# Patient Record
Sex: MALE | Race: BLACK OR AFRICAN AMERICAN | Employment: FULL TIME | URBAN - METROPOLITAN AREA
[De-identification: names, ages, dates, MRNs, and addresses within clinical notes are randomized per-mention and may not be internally consistent; named-entity substitution may affect disease eponyms.]

---

## 2017-01-30 ENCOUNTER — TELEPHONE (OUTPATIENT)
Dept: ENDOCRINOLOGY | Age: 62
End: 2017-01-30

## 2017-03-06 ENCOUNTER — PATIENT OUTREACH (OUTPATIENT)
Dept: ENDOCRINOLOGY | Age: 62
End: 2017-03-06

## 2017-03-06 NOTE — LETTER
3/6/2017 11:45 AM 
 
Mr. Vladislav Long [address] [facility] [place] [id number] Dear Jeanette Kevin Jaywa: 
 
I am a RN Nurse Navigator working with [facility]. Part of my job is to follow up with patients who have been in the emergency department, hospital, or have a chronic disease. I check to see how you are feeling, answer any questions you may have about your health and check to see if you have a follow-up appointment to see your primary care physician. If you have changed your Primary Care Provider, let us know so we can update our records. Otherwise, I am available to help provide education and resources for your needs. I can be reached Monday thru Friday at [phone] or [phone]. Your next appointment with Dr. Berny Pack is scheduled for Friday, March 24, 2017 at 11:30 am. 
 
Thank you for allowing me to participate in your care! Sincerely, Rafa Lopez RN Enclosure(s) Home Blood Sugar Monitoring Record Please call, email, mail or fax your most recent blood sugar readings to office for review. Fax number is [phone] or [phone]. Thank you.

## 2017-03-06 NOTE — PROGRESS NOTES
Spoke to patient today, verified patient's name, address and . Patient states doing well, no problems or concerns noted. Patient states taking blood sugar readings 1-2 times daily, prior to breakfast, 2-hour after meal or bedtime. Patient states will fax most recent blood sugar readings to office for MD review. Patient given fax number of [phone]. Patient open to case management due to uncontrolled diabetes with a last A1C of 10.6 on 10/14/16. Patient states currently taking: Metformin 1,000 mg 2 tablets at dinner, Glipizide 10 mg 2 times daily, Farxiga 10 mg daily and Tresiba 32 unit in the morning. Patient's last office visit was on 10/20/16; next scheduled office visit is on 3/24/17 at 11:30 am.    Patient goal is to record blood sugar readings daily and send to office for MD review. Patient has nurse navigator's contact information and knows to call if needed. Will continue to follow.   Next contact: 3/24/17

## 2017-03-24 ENCOUNTER — OFFICE VISIT (OUTPATIENT)
Dept: ENDOCRINOLOGY | Age: 62
End: 2017-03-24

## 2017-03-24 VITALS
HEART RATE: 74 BPM | DIASTOLIC BLOOD PRESSURE: 84 MMHG | SYSTOLIC BLOOD PRESSURE: 152 MMHG | BODY MASS INDEX: 30.49 KG/M2 | HEIGHT: 71 IN | WEIGHT: 217.8 LBS

## 2017-03-24 DIAGNOSIS — I10 ESSENTIAL HYPERTENSION, BENIGN: ICD-10-CM

## 2017-03-24 DIAGNOSIS — E55.9 VITAMIN D DEFICIENCY: ICD-10-CM

## 2017-03-24 RX ORDER — CHOLECALCIFEROL (VITAMIN D3) 125 MCG
CAPSULE ORAL
COMMUNITY
End: 2020-01-29 | Stop reason: ALTCHOICE

## 2017-03-24 RX ORDER — DICLOFENAC SODIUM 10 MG/G
GEL TOPICAL 2 TIMES DAILY
COMMUNITY
End: 2018-02-16

## 2017-03-24 RX ORDER — METFORMIN HYDROCHLORIDE 500 MG/1
TABLET, EXTENDED RELEASE ORAL
Qty: 120 TAB | Refills: 11 | Status: SHIPPED | OUTPATIENT
Start: 2017-03-24 | End: 2018-03-28 | Stop reason: SDUPTHER

## 2017-03-24 NOTE — PATIENT INSTRUCTIONS
1) I would stop checking the sugars every morning. Instead, check 2 hours after a meal (goal is less than 180). Alternate one meal a day to check (example: one day check after breakfast, one day after lunch, one day after dinner). Write down what you ate if your blood sugar is more than 180 so you can know to cut back or cut out this food from your diet. 2) Your Hemoglobin A1c (3 month test of blood sugar) is 9.4% which is down from 10.6% and is the lowest it's been in 2 years but I think we can continue to get this lower. 3) If you are having readings under 90 more than 2 times a week, plan on decreasing the tresiba by 2 units as needed. For now stay on 32. 4) Try to get back into walking 10-15 minutes a day 2-3 times a week and work up to 30 minutes 5 times a week. This does not have to be done altogether but can be split up throughout the day. 5) Your cholesterol and vitamin D and kidney function are normal.    6) Your blood pressure was a little high today but we'll watch this for now. 7) You can try a Super B-complex vitamin with B6 and B12 one tab daily to see if this helps with any of the nerve pain symptoms you have been having. 8) Do your best to eliminate the soda, honey buns and candy. 9) We will try the toujeo in place of tresiba 32 units at night. As long as you are tolerating this, go ahead and fill the prescription for the toujeo and take the co-pay card for $10.    10) Try the jardiance 10 mg tabs in place of the farxiga. As long as you are tolerating this, go and fill the prescription and take the co-pay card for $0.    11) We will have to change to generic metformin  mg tabs to take 4 tabs daily in place of the 1000 mg tabs.

## 2017-03-24 NOTE — PROGRESS NOTES
Chief Complaint   Patient presents with    Diabetes     pcp and pharmacy confirmed     History of Present Illness: Angela Melo is a 59 y.o. male here for follow up of diabetes. Weight up 1 lbs since last visit in 10/16. Has been taking 32 units of tresiba at night and metformin 2 tabs at night and glipizide 10 mg bid and farxiga 10 mg daily. His fasting sugars are in the 90-130s for the most part but does have a few readings in the 150-160s. Does have a few in the 80-90 range when he cut his eating off   Not checking after meal readings. Started a mvi and fish oil the past 3 weeks and started doing more exercise and is feeling better and hopes to get his sugars down with more weight loss. Has an exercise bike at home that he hopes to use more and hopes to increase his walking. Does drink one 20 oz regular soda per day but no other sweet drinks. Does have a honey bun for lunch and some candy. His insurance will no longer cover the ER metformin 1g tabs, farxiga and tresiba as of 4/1/17 so we had to review all the changes as below. Current Outpatient Prescriptions   Medication Sig    MULTIVIT-MINERALS/FA/LYCOPENE (ONE-A-DAY MEN'S MULTIVITAMIN PO) Take  by mouth.  cholecalciferol, vitamin D3, (VITAMIN D3) 2,000 unit tab Take  by mouth.  diclofenac (VOLTAREN) 1 % gel Apply  to affected area two (2) times a day.  insulin degludec (TRESIBA FLEXTOUCH U-200) 200 unit/mL (3 mL) inpn 32 Units by SubCUTAneous route daily. Dose change 11/23/16--updated med list--did not send prescription to the pharmacy    dapagliflozin (FARXIGA) 10 mg tab Take 1 Tab by mouth daily.  BLOOD-GLUCOSE METER (RELION PRIME METER) by Does Not Apply route.  JORGE PEN NEEDLE 32 gauge x 5/32\" ndle Use as directed once daily    CINNAMON BARK (CINNAMON PO) Take  by mouth.  DOCOSAHEXANOIC ACID/EPA (FISH OIL PO) Take  by mouth.  CARTIA  mg ER capsule Take 240 mg by mouth daily.     losartan-hydrochlorothiazide (HYZAAR) 100-25 mg per tablet Take 1 Tab by mouth daily.  aspirin 81 mg chewable tablet Take 81 mg by mouth daily.  metFORMIN ER 1,000 mg tr24 Take 2 Tabs by mouth daily (with dinner).  glipiZIDE SR (GLUCOTROL) 10 mg CR tablet Take 10 mg by mouth two (2) times a day. No current facility-administered medications for this visit. No Known Allergies     Review of Systems:  - Eyes: no blurry vision or double vision  - Cardiovascular: no chest pain  - Respiratory: no shortness of breath  - Musculoskeletal: no myalgias  - Neurological: no numbness/tingling in extremities    Physical Examination:  Blood pressure 152/84, pulse 74, height 5' 11\" (1.803 m), weight 217 lb 12.8 oz (98.8 kg). - General: pleasant, no distress, good eye contact   - Neck: no carotid bruits  - Cardiovascular: regular, normal rate, nl s1 and s2, no m/r/g,   - Respiratory: clear bilaterally  - Integumentary: no edema,   - Psychiatric: normal mood and affect    Data Reviewed:   Component      Latest Ref Rng & Units 3/20/2017 3/20/2017 3/20/2017 3/20/2017           7:39 AM  7:39 AM  7:39 AM  7:39 AM   Glucose      65 - 99 mg/dL  102 (H)     BUN      8 - 27 mg/dL  14     Creatinine      0.76 - 1.27 mg/dL  0.82     GFR est non-AA      >59 mL/min/1.73  94     GFR est AA      >59 mL/min/1.73  109     BUN/Creatinine ratio      10 - 22  17     Sodium      134 - 144 mmol/L  139     Potassium      3.5 - 5.2 mmol/L  4.5     Chloride      96 - 106 mmol/L  97     CO2      18 - 29 mmol/L  24     Calcium      8.6 - 10.2 mg/dL  9.0     Cholesterol, total      100 - 199 mg/dL 154      Triglyceride      0 - 149 mg/dL 65      HDL Cholesterol      >39 mg/dL 47      VLDL, calculated      5 - 40 mg/dL 13      LDL, calculated      0 - 99 mg/dL 94      Hemoglobin A1c, (calculated)      4.8 - 5.6 %    9.4 (H)   Estimated average glucose      mg/dL    223   VITAMIN D, 25-HYDROXY      30.0 - 100.0 ng/mL   41.0        Assessment/Plan:     1.  Type II or unspecified type diabetes mellitus without mention of complication, uncontrolled ([facility]): his most recent Hgb A1c was 9.4% in 3/17 down from 10.6% in 10/16 up from 9.9% in 4/16 down from 11.4% in 1/16 up from 10.2% in 9/15 up from 10% in 1/15. A1c is the best it's been in 2 years but still is above goal.  The likely problem is his post-prandial sugars are rising but he doesn't check to see what they are so I advised him to please start doing this. We need to change his metformin ER 1000 mg tabs to 500 mg tabs and his farxiga to jardiance and his tresiba to New KCBX per his formulary. I gave him samples of the toujeo and jardiance to try. We spent 40 minutes of face to face time together and > 50% of the time was spent in counseling on diabetes management and determining what changes to make to his medication regimen. He will eliminate soda and candy and honey buns and begin an exercise regimen as below. - cont metformin ER 1000 mg 2 tabs at dinner and then change to metformin  mg tabs 4 tabs at dinner  - cont glipizide ER 10 mg bid  - cont farxiga 10 mg daily, then changed to jardiance 10 mg daily  - cont tresiba 32 units daily, then change to toujeo 32 units daily  - check bs twice daily at alternating times  - foot exam done 5/16  - optho UTD 1/15  - Given DM, Goal LDL < 100, non-HDL < 130, and TG < 150. LDL 79 in 1/15 and 68 in 9/15 and 70 in 4/16 and 94 in 3/17 not on meds  - microalbumin nl 10/16  - check A1c and bmp prior to next visit      2. Unspecified essential hypertension: his BP was above goal < 140/90 but changes in diet and exercise should help. -  cont current regimen for now      3. Unspecified vitamin D deficiency: Level was 28.1 in 9/15 and with taking 1000 units daily up to 37 in 1/16.   Down to 29 in 10/16 so increased to 2000 units daily and up to 41 in 3/17.  - cont vitamin D 2000 units daily  - check Vitamin D 25-OH level in 8 months        Patient Instructions   1) I would stop checking the sugars every morning. Instead, check 2 hours after a meal (goal is less than 180). Alternate one meal a day to check (example: one day check after breakfast, one day after lunch, one day after dinner). Write down what you ate if your blood sugar is more than 180 so you can know to cut back or cut out this food from your diet. 2) Your Hemoglobin A1c (3 month test of blood sugar) is 9.4% which is down from 10.6% and is the lowest it's been in 2 years but I think we can continue to get this lower. 3) If you are having readings under 90 more than 2 times a week, plan on decreasing the tresiba by 2 units as needed. For now stay on 32. 4) Try to get back into walking 10-15 minutes a day 2-3 times a week and work up to 30 minutes 5 times a week. This does not have to be done altogether but can be split up throughout the day. 5) Your cholesterol and vitamin D and kidney function are normal.    6) Your blood pressure was a little high today but we'll watch this for now. 7) You can try a Super B-complex vitamin with B6 and B12 one tab daily to see if this helps with any of the nerve pain symptoms you have been having. 8) Do your best to eliminate the soda, honey buns and candy. 9) We will try the toujeo in place of tresiba 32 units at night. As long as you are tolerating this, go ahead and fill the prescription for the toujeo and take the co-pay card for $10.    10) Try the jardiance 10 mg tabs in place of the farxiga. As long as you are tolerating this, go and fill the prescription and take the co-pay card for $0.    11) We will have to change to generic metformin  mg tabs to take 4 tabs daily in place of the 1000 mg tabs. Follow-up Disposition:  Return in about 5 months (around 8/24/2017).     Copy sent to:  Dr. Dai Coley

## 2017-03-24 NOTE — MR AVS SNAPSHOT
Visit Information Date & Time Provider Department Dept. Phone Encounter #  
 3/24/2017 11:30 AM Humza Gamble, [address] [place] [address] Diabetes and Endocrinology [phone] [id number] Follow-up Instructions Return in about 5 months (around 8/24/2017). Upcoming Health Maintenance Date Due Hepatitis C Screening 1955 Pneumococcal 19-64 Medium Risk (1 of 1 - PPSV23) 8/5/1972 DTaP/Tdap/Td series (1 - Tdap) 8/5/1974 FOBT Q 1 YEAR AGE 50-75 8/5/2003 ZOSTER VACCINE AGE 60> 8/5/2013 EYE EXAM RETINAL OR DILATED Q1 1/31/2016 INFLUENZA AGE 9 TO ADULT 8/1/2016 FOOT EXAM Q1 5/5/2017 HEMOGLOBIN A1C Q6M 9/20/2017 MICROALBUMIN Q1 10/14/2017 LIPID PANEL Q1 3/20/2018 Allergies as of 3/24/2017  Review Complete On: 3/24/2017 By: Humza Gamble MD  
 No Known Allergies Current Immunizations  Never Reviewed No immunizations on file. Not reviewed this visit You Were Diagnosed With   
  
 Codes Comments Uncontrolled type 2 diabetes mellitus without complication, with long-term current use of insulin ([facility]    -  Primary ICD-10-CM: E11.65, Z79.4 ICD-9-CM: 250.02, V58.67 Essential hypertension, benign     ICD-10-CM: I10 
ICD-9-CM: 401.1 Vitamin D deficiency     ICD-10-CM: E55.9 ICD-9-CM: 268.9 Vitals BP Pulse Height(growth percentile) Weight(growth percentile) BMI Smoking Status 152/84 74 5' 11\" (1.803 m) 217 lb 12.8 oz (98.8 kg) 30.38 kg/m2 Never Smoker Vitals History BMI and BSA Data Body Mass Index Body Surface Area  
 30.38 kg/m 2 2.22 m 2 Preferred Pharmacy Pharmacy Name Phone [facility] [address] [facility], [facility] [phone] Your Updated Medication List  
  
   
This list is accurate as of: 3/24/17 12:40 PM.  Always use your most recent med list.  
  
  
  
  
 aspirin 81 mg chewable tablet Take 81 mg by mouth daily. CARTIA  mg ER capsule Generic drug:  dilTIAZem CD Take 240 mg by mouth daily. CINNAMON PO Take  by mouth. diclofenac 1 % Gel Commonly known as:  VOLTAREN Apply  to affected area two (2) times a day. empagliflozin 10 mg tablet Commonly known as:  Kaya Gm Take 1 Tab by mouth daily. FISH OIL PO Take  by mouth. glipiZIDE SR 10 mg CR tablet Commonly known as:  GLUCOTROL XL Take 10 mg by mouth two (2) times a day. insulin glargine 300 unit/mL (1.5 mL) Inpn Commonly known as:  TOUJEO SOLOSTAR  
32 Units by SubCUTAneous route nightly. losartan-hydroCHLOROthiazide 100-25 mg per tablet Commonly known as:  HYZAAR Take 1 Tab by mouth daily. metFORMIN  mg tablet Commonly known as:  GLUCOPHAGE XR Take 4 tabs daily Renetta Pen Needle 32 gauge x \" Ndle Generic drug:  Insulin Needles (Disposable) Use as directed once daily ONE-A-DAY MEN'S MULTIVITAMIN PO Take  by mouth. RELION PRIME METER  
by Does Not Apply route. VITAMIN D3 2,000 unit Tab Generic drug:  cholecalciferol (vitamin D3) Take  by mouth. Prescriptions Printed Refills  
 insulin glargine (TOUJEO SOLOSTAR) 300 unit/mL (1.5 mL) inpn 11 Si Units by SubCUTAneous route nightly. Class: Print Route: SubCUTAneous  
 empagliflozin (JARDIANCE) 10 mg tablet 11 Sig: Take 1 Tab by mouth daily. Class: Print Route: Oral  
 metFORMIN ER (GLUCOPHAGE XR) 500 mg tablet 11 Sig: Take 4 tabs daily Class: Print We Performed the Following HEMOGLOBIN A1C WITH EAG [10864 CPT(R)] METABOLIC PANEL, BASIC [11369 CPT(R)] Follow-up Instructions Return in about 5 months (around 2017). Patient Instructions 1) I would stop checking the sugars every morning. Instead, check 2 hours after a meal (goal is less than 180).   Alternate one meal a day to check (example: one day check after breakfast, one day after lunch, one day after dinner). Write down what you ate if your blood sugar is more than 180 so you can know to cut back or cut out this food from your diet. 2) Your Hemoglobin A1c (3 month test of blood sugar) is 9.4% which is down from 10.6% and is the lowest it's been in 2 years but I think we can continue to get this lower. 3) If you are having readings under 90 more than 2 times a week, plan on decreasing the tresiba by 2 units as needed. For now stay on 32. 4) Try to get back into walking 10-15 minutes a day 2-3 times a week and work up to 30 minutes 5 times a week. This does not have to be done altogether but can be split up throughout the day. 5) Your cholesterol and vitamin D and kidney function are normal. 
 
6) Your blood pressure was a little high today but we'll watch this for now. 7) You can try a Super B-complex vitamin with B6 and B12 one tab daily to see if this helps with any of the nerve pain symptoms you have been having. 8) Do your best to eliminate the soda, honey buns and candy. 9) We will try the toujeo in place of tresiba 32 units at night. As long as you are tolerating this, go ahead and fill the prescription for the toujeo and take the co-pay card for $10. 
 
10) Try the jardiance 10 mg tabs in place of the farxiga. As long as you are tolerating this, go and fill the prescription and take the co-pay card for $0. 
 
11) We will have to change to generic metformin  mg tabs to take 4 tabs daily in place of the 1000 mg tabs. Introducing [facility]! Idris Velasco introduces [employer] (IMMI) patient portal. Now you can access parts of your medical record, email your doctor's office, and request medication refills online. 1. In your internet browser, go to [email or web address] 2. Click on the First Time User? Click Here link in the Sign In box. You will see the New Member Sign Up page. 3. Enter your EatingWell Access Code exactly as it appears below. You will not need to use this code after youve completed the sign-up process. If you do not sign up before the expiration date, you must request a new code. · EatingWell Access Code: [email or web address] Expires: 6/22/2017 12:28 PM 
 
4. Enter the last four digits of your Social Security Number (xxxx) and Date of Birth (mm/dd/yyyy) as indicated and click Submit. You will be taken to the next sign-up page. 5. Create a EatingWell ID. This will be your EatingWell login ID and cannot be changed, so think of one that is secure and easy to remember. 6. Create a EatingWell password. You can change your password at any time. 7. Enter your Password Reset Question and Answer. This can be used at a later time if you forget your password. 8. Enter your e-mail address. You will receive e-mail notification when new information is available in [address]. 9. Click Sign Up. You can now view and download portions of your medical record. 10. Click the Download Summary menu link to download a portable copy of your medical information. If you have questions, please visit the Frequently Asked Questions section of the EatingWell website. Remember, EatingWell is NOT to be used for urgent needs. For medical emergencies, dial 911. Now available from your iPhone and Android! Please provide this summary of care documentation to your next provider. Your primary care clinician is listed as Jory Wei. If you have any questions after today's visit, please call [phone].

## 2017-03-29 ENCOUNTER — PATIENT OUTREACH (OUTPATIENT)
Dept: ENDOCRINOLOGY | Age: 62
End: 2017-03-29

## 2017-03-29 NOTE — PROGRESS NOTES
Attempted to contact patient, follow up for chronic condition of diabetes with blood sugar readings, insulin regimen and other diabetic medications, no answer, left voicemail message to return telephone call. Patient's current A1C is 9.4 on 3/20/17, decreased from 10.6 on 10/14/16.

## 2017-03-31 ENCOUNTER — PATIENT OUTREACH (OUTPATIENT)
Dept: ENDOCRINOLOGY | Age: 62
End: 2017-03-31

## 2017-04-03 ENCOUNTER — TELEPHONE (OUTPATIENT)
Dept: ENDOCRINOLOGY | Age: 62
End: 2017-04-03

## 2017-04-04 ENCOUNTER — PATIENT OUTREACH (OUTPATIENT)
Dept: ENDOCRINOLOGY | Age: 62
End: 2017-04-04

## 2017-04-04 NOTE — PROGRESS NOTES
Spoke to patient today, verified patient's name, address and . Patient states doing good, no problems or concerns noted. Patient states taking blood sugar readings 1 time daily, prior to breakfast.  Encouraged patient to take blood sugar reading 2 times daily, prior to breakfast and after dinner or at bedtime. Encouraged patient to record and send blood sugar reading to office for MD review. Patient states agreement and understanding. Patient states taking: Toujeo 32 units nightly, Jardiance 10 mg daily, Metformin  mg, 4 tablets daily and Glipizide 10 mg twice daily. Patient states currently using samples provided by MD at last office visit. Encouraged patient to active discount saving cards for Toujeo and Jardiance and fill prescription. Advised patient to call me if experiencing difficulty filling prescriptions at discounted price. Patient states agreement and understanding.       Next contact:  4/10/17

## 2017-04-10 ENCOUNTER — PATIENT OUTREACH (OUTPATIENT)
Dept: ENDOCRINOLOGY | Age: 62
End: 2017-04-10

## 2017-04-10 ENCOUNTER — TELEPHONE (OUTPATIENT)
Dept: ENDOCRINOLOGY | Age: 62
End: 2017-04-10

## 2017-04-10 NOTE — PROGRESS NOTES
Patient return telephone call, verified patient's name, address and . Patient states having trouble with discount savings card for Jardiance medication. Patient states Vanesa Ronquillo in [facility] ([phone]) saying that he has federally issued healtih insurance and does not qualify for the discount saving cards. Patient argues that he has regular commercial health insurance which should be able to qualify for the saving program.      NN called [employer] representative, Lucille Ramirez for advise. NN advised to call [facility] for more details. Spoke to Emily at [facility], pharmacy technician states that there is record of patient's use of the Jardiance savings card. Called patient again, patient states talking to Johny at [facility]. NN called Lucille Ramirez to contact Hinton at [facility] for clarification of discount savings card issue and possible override of the discount saving card. Lucille Ramirez called states will work with Vanesa Ronquillo to help override discount savings card for patient. Lucille Ramirez stated that patient's co-payment with his health insurance is $30 and $0 with discount savings card. NN states patient has multiple medications and cost of current medications co-payments are expensive to patient. NN has not received an answer about the Jardiance discount saving card status, left voicemail message with patient about status of Jardiance medication.     Next contact: 17

## 2017-04-10 NOTE — TELEPHONE ENCOUNTER
Patient is calling to speak with you. Please return his call to [phone]. Thank you.   Ernie Griffin

## 2017-04-14 ENCOUNTER — PATIENT OUTREACH (OUTPATIENT)
Dept: ENDOCRINOLOGY | Age: 62
End: 2017-04-14

## 2017-04-14 NOTE — PROGRESS NOTES
[place] , Cody Song left voicemail message that patient was approved for discount saving card for Jardiance at [employer] for 12 months. Patient informed by pharmacy tech, Shon Wilhelm and medication was picked up by patient. Spoke to patient today, verified patient's name, address and . Patient states doing good, no problems or concerns noted. Patient states taking: Jardiance 10 mg daily , Toujeo 32 units nightly, Metformin 500 mg, 4 tablets daily and Glipizide 10 mg twice daily      Goals Addressed             Most Recent     Knowledge and adherence of medication (ie. action, side effects, missed dose, etc.)   On track (2016)             Patient will active new discount savings cards for Jardiance and [address]. Patient will fill prescription and call office if difficulty attaining medications. YHW    17  Patient was approved for discount savings card for Toujeo ($10) and Jardiance ($0). YHW    17   Patient picked up medication and is currently taking. YW       Skills necessary to properly manage their diabetes, including use of devices and symptom monitoring tools. On track (2016)             17   Patient will check blood sugar readings 1-2 times daily, prior to breakfast or 2-hours after eating, record and send to office for MD review.   Patel George             Next contact: 17

## 2017-04-21 RX ORDER — PEN NEEDLE, DIABETIC 31 GX5/16"
NEEDLE, DISPOSABLE MISCELLANEOUS
Qty: 100 PEN NEEDLE | Refills: 11 | Status: SHIPPED | OUTPATIENT
Start: 2017-04-21 | End: 2018-07-03 | Stop reason: SDUPTHER

## 2017-05-03 ENCOUNTER — PATIENT OUTREACH (OUTPATIENT)
Dept: ENDOCRINOLOGY | Age: 62
End: 2017-05-03

## 2017-05-03 NOTE — PROGRESS NOTES
Attempted to contact patient, follow up for chronic condition of diabetes with blood sugar readings and diabetic medication regimen, no answer, left voicemail message to return telephone call.

## 2017-06-07 ENCOUNTER — PATIENT OUTREACH (OUTPATIENT)
Dept: ENDOCRINOLOGY | Age: 62
End: 2017-06-07

## 2017-06-07 NOTE — PROGRESS NOTES
Attempted to contact patient, follow up for chronic condition of diabetes with blood sugar readings and insulin regimen. No answer, left voicemail message to return telephone call.

## 2017-06-21 ENCOUNTER — PATIENT OUTREACH (OUTPATIENT)
Dept: ENDOCRINOLOGY | Age: 62
End: 2017-06-21

## 2017-06-21 RX ORDER — FLUCONAZOLE 150 MG/1
TABLET ORAL
Qty: 2 TAB | Refills: 3 | Status: SHIPPED | OUTPATIENT
Start: 2017-06-21 | End: 2017-09-15

## 2017-06-21 NOTE — PROGRESS NOTES
Patient called today, verified patient's name, address and . Patient states is having a problem with medication, Jardiance. Patient states having symptoms of a yeast infection. Patient states has not had a visit with primary care physician (PCP). Patient states he read about the side effects of Jardiance and believes this is has a yeast infection. MD notified. Lucero Obregon MD at [phone]        Status: Sign at close encounter            Have him take a dose of fluconazole today to treat the yeast infection and if needed may repeat in 2 days. I put refills on this in case he needs them in the future. If this is continuing despite the fluconazole, then he should let me know and we may need to stop the jardiance. Called patient, informed patient of the above MD instruction. Patient states understanding and agreement. Goals Addressed             Most Recent     Knowledge and adherence of medication (ie. action, side effects, missed dose, etc.)   On track (2016)             Patient will active new discount savings cards for Jardiance and [address]. Patient will fill prescription and call office if difficulty attaining medications. YW    17  Patient was approved for discount savings card for Toujeo ($10) and Jardiance ($0). YW    17   Patient picked up medication and is currently taking. YHW    17  Patient having side effects from Jardiance, possible yeast infection. MD notified. VARINDER educated patient on increasing oral fluids and the practice good hygiene.   Salina Alva

## 2017-06-21 NOTE — PROGRESS NOTES
Have him take a dose of fluconazole today to treat the yeast infection and if needed may repeat in 2 days. I put refills on this in case he needs them in the future. If this is continuing despite the fluconazole, then he should let me know and we may need to stop the jardiance.

## 2017-06-23 ENCOUNTER — PATIENT OUTREACH (OUTPATIENT)
Dept: ENDOCRINOLOGY | Age: 62
End: 2017-06-23

## 2017-06-23 NOTE — PROGRESS NOTES
Attempted to contact patient, follow up for side effect of yeast infection with Jardiance medication. MD prescribed oral antifungal medication. No answer, left voicemail to return telephone call. 2:20 pm  Patient called, states still having issue with yeast infection and did take the second dose of antifungal medication today as per MD.      Patient states will call next week if no relief.

## 2017-07-12 ENCOUNTER — PATIENT OUTREACH (OUTPATIENT)
Dept: ENDOCRINOLOGY | Age: 62
End: 2017-07-12

## 2017-07-12 NOTE — Clinical Note
FYI,  Patient will discontinue the Jardiance, increase the Toujeo to 36 units at night and will be sending blood sugar in for review in about 1 week.

## 2017-07-12 NOTE — PROGRESS NOTES
Patient called, verified patient's name, address and . Patient states continues with yeast-infection with Jardiance medication. Patient states Diflucan did work in the beginning, but is not at this time. Patient would like to discontinue the Jardiance. MD notified. Patient states is currently taking: Toujeo 32 units at night, Metformin 2,000 mg daily, Glipizide 10 mg daily and Jardiance 10 mg daily. Patient states blood sugars have been averaging between: 100-180. Patient states will fax most recent blood sugar reading to office for review. Clau Nelson MD at [phone]        Status: Signed            If he stops the jardiance, he likely will need more insulin to control his blood sugars and would recommend increasing the toujeo to 36 units at night. NN spoke to patient, verified patient's name, address and . Patient informed of MD above instruction. Patient states agreement and understanding.

## 2017-07-12 NOTE — PROGRESS NOTES
If he stops the jardiance, he likely will need more insulin to control his blood sugars and would recommend increasing the toujeo to 36 units at night.

## 2017-07-20 ENCOUNTER — PATIENT OUTREACH (OUTPATIENT)
Dept: ENDOCRINOLOGY | Age: 62
End: 2017-07-20

## 2017-07-20 NOTE — PROGRESS NOTES
Goals Addressed             Most Recent     Patient verbalizes understanding of self -management goals of living with Diabetes. On track (12/13/2016)             7/12/17  Per MD, discontinue Jardiance and increase Toujeo to 36 units nightly. YHW    7/20/17  Attempted to contact patient, follow up for chronic condition of diabetes with blood sugar readings and insulin regimen, no answer, left voicemail message to return telephone call.   Arthur Ogden

## 2017-07-27 ENCOUNTER — PATIENT OUTREACH (OUTPATIENT)
Dept: ENDOCRINOLOGY | Age: 62
End: 2017-07-27

## 2017-07-27 NOTE — PROGRESS NOTES
Goals Addressed             Most Recent     Patient verbalizes understanding of self -management goals of living with Diabetes. On track (12/13/2016)             7/12/17  Per MD, discontinue Jardiance and increase Toujeo to 36 units nightly. YHW    7/20/17  Attempted to contact patient, follow up for chronic condition of diabetes with blood sugar readings and insulin regimen, no answer, left voicemail message to return telephone call. [facility]    7/27/17  Patient called office with most recent blood sugar readings. YHW                    Per patient, most recent blood sugar readings:    7/12/17  155    7/13/17  101    7/14/17  113    7/15/17  127    7/16/17  No reading    7/17/17  135    7/18/17  145    7/19/17  134    7/20/17  No reading    7/21/17  131    Patient states he has stopped the Jardiance and increased the Toujeo to 36 units nightly.

## 2017-08-01 ENCOUNTER — PATIENT OUTREACH (OUTPATIENT)
Dept: ENDOCRINOLOGY | Age: 62
End: 2017-08-01

## 2017-08-01 NOTE — PROGRESS NOTES
Please let him know that these readings look pretty good but to keep his sugars under 130 everyday, I would recommend increasing the toujeo to 40 units daily now that he is off the jardiance.

## 2017-08-01 NOTE — PROGRESS NOTES
Goals Addressed             Most Recent     Patient verbalizes understanding of self -management goals of living with Diabetes. On track (12/13/2016)             7/12/17  Per MD, discontinue Jardiance and increase Toujeo to 36 units nightly. YHW    7/20/17  Attempted to contact patient, follow up for chronic condition of diabetes with blood sugar readings and insulin regimen, no answer, left voicemail message to return telephone call. [facility]    7/27/17  Patient called office with most recent blood sugar readings. [facility]    8/1/17  Christina Pulliam MD at 07/27/17 1548        Status: Signed            Please let him know that these readings look pretty good but to keep his sugars under 130 everyday, I would recommend increasing the toujeo to 40 units daily now that he is off the jardiance. NN called patient, left voicemail message with the above MD instruction.

## 2017-08-28 ENCOUNTER — PATIENT OUTREACH (OUTPATIENT)
Dept: ENDOCRINOLOGY | Age: 62
End: 2017-08-28

## 2017-08-29 NOTE — PROGRESS NOTES
Goals Addressed             Most Recent     Patient verbalizes understanding of self -management goals of living with Diabetes. On track (12/13/2016)             7/12/17  Per MD, discontinue Jardiance and increase Toujeo to 36 units nightly. YHW    7/20/17  Attempted to contact patient, follow up for chronic condition of diabetes with blood sugar readings and insulin regimen, no answer, left voicemail message to return telephone call. [facility]    7/27/17  Patient called office with most recent blood sugar readings. [facility]    8/1/17  Heidi Calabrese MD at 07/27/17 1548        Status: Signed            Please let him know that these readings look pretty good but to keep his sugars under 130 everyday, I would recommend increasing the toujeo to 40 units daily now that he is off the jardiance. NN called patient, left voicemail message with the above MD instruction. 8/28/17  Patient left NN voicemail message with most recent blood sugar readings. NN recorded readings and forwarded to MD for review.   [facility]

## 2017-08-31 LAB
BUN SERPL-MCNC: 15 MG/DL (ref 8–27)
BUN/CREAT SERPL: 21 (ref 10–24)
CALCIUM SERPL-MCNC: 9 MG/DL (ref 8.6–10.2)
CHLORIDE SERPL-SCNC: 98 MMOL/L (ref 96–106)
CO2 SERPL-SCNC: 27 MMOL/L (ref 18–29)
CREAT SERPL-MCNC: 0.73 MG/DL (ref 0.76–1.27)
EST. AVERAGE GLUCOSE BLD GHB EST-MCNC: 237 MG/DL
GLUCOSE SERPL-MCNC: 96 MG/DL (ref 65–99)
HBA1C MFR BLD: 9.9 % (ref 4.8–5.6)
POTASSIUM SERPL-SCNC: 4.2 MMOL/L (ref 3.5–5.2)
SODIUM SERPL-SCNC: 139 MMOL/L (ref 134–144)

## 2017-09-07 ENCOUNTER — PATIENT OUTREACH (OUTPATIENT)
Dept: ENDOCRINOLOGY | Age: 62
End: 2017-09-07

## 2017-09-07 NOTE — PROGRESS NOTES
Jay Gomes routed conversation to You 1 hour ago (10:08 AM)                     Sawyer Mcallister [phone]  Jay Mireya 1 hour ago (10:07 AM)                       Jay Mireya 1 hour ago (10:07 AM)              Patient called to talk to you about his blood sugar readings. He can be reached at:  [phone]. NN returned patient's telephone call, left message to return telephone call. Goals Addressed             Most Recent     COMPLETED: Knowledge and adherence of medication (ie. action, side effects, missed dose, etc.)   On track (12/13/2016)             Patient will active new discount savings cards for Jardiance and [address]. Patient will fill prescription and call office if difficulty attaining medications. YW    4/14/17  Patient was approved for discount savings card for Toujeo ($10) and Jardiance ($0). YW    4/14/17   Patient picked up medication and is currently taking. YW    6/21/17  Patient having side effects from Jardiance, possible yeast infection. MD notified. NN educated patient on increasing oral fluids and the practice good hygiene. YHW       Patient verbalizes understanding of self -management goals of living with Diabetes. On track (12/13/2016)             7/12/17  Per MD, discontinue Jardiance and increase Toujeo to 36 units nightly. YW    7/20/17  Attempted to contact patient, follow up for chronic condition of diabetes with blood sugar readings and insulin regimen, no answer, left voicemail message to return telephone call. [facility]    7/27/17  Patient called office with most recent blood sugar readings. [facility]    8/1/17  Grayson Blum MD at 07/27/17 7298        Status: Signed            Please let him know that these readings look pretty good but to keep his sugars under 130 everyday, I would recommend increasing the toujeo to 40 units daily now that he is off the jardiance. NN called patient, left voicemail message with the above MD instruction. 17  Patient left NN voicemail message with most recent blood sugar readings. NN recorded readings and forwarded to MD for review. YHW    17  Spoke to patient today, verified patient's name, address and . Patient states doing well, no problems or concerns noted. Patient states has begun exercising daily, doing the stationary bike and light weights. Patient states exercises from 30-60 minutes in the evening after work. Patient states taking medications as prescribed. Patient states having a follow up appointment on 9/15/17. NN encouraged patient to continue recording blood sugar readings and bring readings to next follow up appointment. Patient states understanding and agreement. YHW                 COMPLETED: Skills necessary to properly manage their diabetes, including use of devices and symptom monitoring tools. On track (2016)             17   Patient will check blood sugar readings 1-2 times daily, prior to breakfast or 2-hours after eating, record and send to office for MD review.   Anum Schreiber

## 2017-09-15 ENCOUNTER — OFFICE VISIT (OUTPATIENT)
Dept: ENDOCRINOLOGY | Age: 62
End: 2017-09-15

## 2017-09-15 ENCOUNTER — PATIENT OUTREACH (OUTPATIENT)
Dept: ENDOCRINOLOGY | Age: 62
End: 2017-09-15

## 2017-09-15 VITALS
DIASTOLIC BLOOD PRESSURE: 76 MMHG | HEART RATE: 67 BPM | WEIGHT: 215.2 LBS | SYSTOLIC BLOOD PRESSURE: 130 MMHG | BODY MASS INDEX: 30.13 KG/M2 | HEIGHT: 71 IN

## 2017-09-15 DIAGNOSIS — E55.9 VITAMIN D DEFICIENCY: ICD-10-CM

## 2017-09-15 DIAGNOSIS — I10 ESSENTIAL HYPERTENSION, BENIGN: ICD-10-CM

## 2017-09-15 NOTE — PATIENT INSTRUCTIONS
1) We will restart the farxiga 10 mg every morning. I sent a supply to the pharmacy but this will require an authorization and we'll work on it. Take the samples for the next 2 weeks and we'll let you know when it's approved. 2) Do your best to focus on the meals that are causing you to spike over 180 when checked 2 hours after a meal and limit your portions of these foods. 3) If you restart the farxiga and your morning sugars are coming down under 100, then cut back by 2 units as needed on the toujeo. 4) Your blood pressure is at goal but I would check a few times in the coming months at the local pharmacy to ensure it's staying 140/90.

## 2017-09-15 NOTE — MR AVS SNAPSHOT
Visit Information Date & Time Provider Department Dept. Phone Encounter #  
 9/15/2017  1:30 PM Deandre Melissa, [address] [place] [address] Diabetes and Endocrinology [phone] [id number] Follow-up Instructions Return in about 5 months (around 2/15/2018). Upcoming Health Maintenance Date Due Hepatitis C Screening 1955 Pneumococcal 19-64 Medium Risk (1 of 1 - PPSV23) 8/5/1972 DTaP/Tdap/Td series (1 - Tdap) 8/5/1974 FOBT Q 1 YEAR AGE 50-75 8/5/2003 ZOSTER VACCINE AGE 60> 6/5/2013 FOOT EXAM Q1 5/5/2017 INFLUENZA AGE 9 TO ADULT 8/1/2017 MICROALBUMIN Q1 10/14/2017 EYE EXAM RETINAL OR DILATED Q1 1/20/2018 HEMOGLOBIN A1C Q6M 2/28/2018 LIPID PANEL Q1 3/20/2018 Allergies as of 9/15/2017  Review Complete On: 9/15/2017 By: Deandre Melissa MD  
  
 Severity Noted Reaction Type Reactions Jardiance [Empagliflozin]  07/12/2017    Other (comments) Yeast infection Current Immunizations  Never Reviewed No immunizations on file. Not reviewed this visit You Were Diagnosed With   
  
 Codes Comments Uncontrolled type 2 diabetes mellitus without complication, with long-term current use of insulin ([facility]    -  Primary ICD-10-CM: E11.65, Z79.4 ICD-9-CM: 250.02, V58.67 Essential hypertension, benign     ICD-10-CM: I10 
ICD-9-CM: 401.1 Vitamin D deficiency     ICD-10-CM: E55.9 ICD-9-CM: 268.9 Vitals BP Pulse Height(growth percentile) Weight(growth percentile) BMI Smoking Status 130/76 67 5' 11\" (1.803 m) 215 lb 3.2 oz (97.6 kg) 30.01 kg/m2 Never Smoker Vitals History BMI and BSA Data Body Mass Index Body Surface Area 30.01 kg/m 2 2.21 m 2 Preferred Pharmacy Pharmacy Name Phone [address] Your Updated Medication List  
  
   
This list is accurate as of: 9/15/17  2:22 PM.  Always use your most recent med list.  
  
  
  
  
 aspirin 81 mg chewable tablet Take 81 mg by mouth daily. CARTIA  mg ER capsule Generic drug:  dilTIAZem CD Take 240 mg by mouth daily. CINNAMON PO Take  by mouth. dapagliflozin 10 mg Tab tablet Commonly known as:  [employer] Take 1 Tab by mouth daily. Replaces jardiance  
  
 diclofenac 1 % Gel Commonly known as:  VOLTAREN Apply  to affected area two (2) times a day. FISH OIL PO Take  by mouth. glipiZIDE SR 10 mg CR tablet Commonly known as:  GLUCOTROL XL Take 10 mg by mouth two (2) times a day. insulin glargine 300 unit/mL (1.5 mL) Inpn Commonly known as:  TOUJEO SOLOSTAR  
40 Units by SubCUTAneous route nightly. losartan-hydroCHLOROthiazide 100-25 mg per tablet Commonly known as:  HYZAAR Take 1 Tab by mouth daily. metFORMIN  mg tablet Commonly known as:  GLUCOPHAGE XR Take 4 tabs daily * Renetta Pen Needle 32 gauge x 5/32\" Ndle Generic drug:  Insulin Needles (Disposable) Use as directed once daily * BD INSULIN PEN NEEDLE UF MINI 31 gauge x 3/16\" Ndle Generic drug:  Insulin Needles (Disposable) USE  ONCE DAILY  
  
 ONE-A-DAY MEN'S MULTIVITAMIN PO Take  by mouth. RELION PRIME METER  
by Does Not Apply route. VITAMIN D3 2,000 unit Tab Generic drug:  cholecalciferol (vitamin D3) Take  by mouth. * Notice: This list has 2 medication(s) that are the same as other medications prescribed for you. Read the directions carefully, and ask your doctor or other care provider to review them with you. Prescriptions Sent to Pharmacy Refills  
 dapagliflozin (FARXIGA) 10 mg tab tablet 11 Sig: Take 1 Tab by mouth daily. Replaces jardiance Class: Normal  
 Pharmacy:  [facility] Ph #: [phone] Route: Oral  
  
We Performed the Following HEMOGLOBIN A1C WITH EAG [98186 CPT(R)] LIPID PANEL [86417 CPT(R)] METABOLIC PANEL, COMPREHENSIVE [54508 CPT(R)] MICROALBUMIN, UR, RAND W/ MICROALBUMIN/CREA RATIO [id number] CPT(R)] VITAMIN D, 25 HYDROXY [id number] CPT(R)] Follow-up Instructions Return in about 5 months (around 2/15/2018). Patient Instructions 1) We will restart the farxiga 10 mg every morning. I sent a supply to the pharmacy but this will require an authorization and we'll work on it. Take the samples for the next 2 weeks and we'll let you know when it's approved. 2) Do your best to focus on the meals that are causing you to spike over 180 when checked 2 hours after a meal and limit your portions of these foods. 3) If you restart the farxiga and your morning sugars are coming down under 100, then cut back by 2 units as needed on the toujeo. 4) Your blood pressure is at goal but I would check a few times in the coming months at the local pharmacy to ensure it's staying 140/90. Introducing \[facility]\"" & HEALTH SERVICES! Anna Cortes introduces [employer] patient portal. Now you can access parts of your medical record, email your doctor's office, and request medication refills online. 1. In your internet browser, go to [email or web address] 2. Click on the First Time User? Click Here link in the Sign In box. You will see the New Member Sign Up page. 3. Enter your [employer] Access Code exactly as it appears below. You will not need to use this code after youve completed the sign-up process. If you do not sign up before the expiration date, you must request a new code. · [employer] Access Code: [email or web address] Expires: 12/14/2017  2:22 PM 
 
4. Enter the last four digits of your Social Security Number (xxxx) and Date of Birth (mm/dd/yyyy) as indicated and click Submit. You will be taken to the next sign-up page. 5. Create a [employer] ID. This will be your [employer] login ID and cannot be changed, so think of one that is secure and easy to remember. 6. Create a [facility] password. You can change your password at any time. 7. Enter your Password Reset Question and Answer. This can be used at a later time if you forget your password. 8. Enter your e-mail address. You will receive e-mail notification when new information is available in [address] 9. Click Sign Up. You can now view and download portions of your medical record. 10. Click the Download Summary menu link to download a portable copy of your medical information. If you have questions, please visit the Frequently Asked Questions section of the [facility] website. Remember, [facility] is NOT to be used for urgent needs. For medical emergencies, dial 911. Now available from your iPhone and Android! Please provide this summary of care documentation to your next provider. Your primary care clinician is listed as Guillaume Healy. If you have any questions after today's visit, please call [phone].

## 2017-09-15 NOTE — PROGRESS NOTES
Chief Complaint   Patient presents with    Diabetes     pcp and pharmacy confirmed    Diabetic Foot Exam     due     History of Present Illness: Mayra Hermosillo is a 57 y.o. male here for follow up of diabetes. Weight down 2 lbs since last visit in 3/17. Has been taking metformin  mg 2 tabs bid. We stopped the farxiga and changed to jardiance and developed a yeast infection in 6/17 that initially responded to fluconazole but came back within a few weeks and would not go away until the med was stopped and has not had any recurrence off the jardiance. He did not have any trouble with yeast infection on the farxiga. We increased the toujeo once the jardiance was stopped and has been on 40 units since the beginning of 8/17. Fasting sugars have been in the 130s-150s off the jardiance but does have some in the 160-180s. Has had a few readings in the 70s in the afternoon when eating less lunch and doing more weights or playing basketball with his nephew. Has seen some readings in the 200-220s after dinner. Recalls his BP being good at PCP's office in 8/17. Doesn't tend to check at home. Current Outpatient Prescriptions   Medication Sig    insulin glargine (TOUJEO SOLOSTAR) 300 unit/mL (1.5 mL) inpn 40 Units by SubCUTAneous route nightly.  BD INSULIN PEN NEEDLE UF MINI 31 gauge x 3/16\" ndle USE  ONCE DAILY    MULTIVIT-MINERALS/FA/LYCOPENE (ONE-A-DAY MEN'S MULTIVITAMIN PO) Take  by mouth.  cholecalciferol, vitamin D3, (VITAMIN D3) 2,000 unit tab Take  by mouth.  diclofenac (VOLTAREN) 1 % gel Apply  to affected area two (2) times a day.  metFORMIN ER (GLUCOPHAGE XR) 500 mg tablet Take 4 tabs daily    BLOOD-GLUCOSE METER (RELION PRIME METER) by Does Not Apply route.  JORGE PEN NEEDLE 32 gauge x 5/32\" ndle Use as directed once daily    CINNAMON BARK (CINNAMON PO) Take  by mouth.  DOCOSAHEXANOIC ACID/EPA (FISH OIL PO) Take  by mouth.     CARTIA  mg ER capsule Take 240 mg by mouth daily.    losartan-hydrochlorothiazide (HYZAAR) 100-25 mg per tablet Take 1 Tab by mouth daily.  aspirin 81 mg chewable tablet Take 81 mg by mouth daily.  glipiZIDE SR (GLUCOTROL) 10 mg CR tablet Take 10 mg by mouth two (2) times a day. No current facility-administered medications for this visit. Allergies   Allergen Reactions    Jardiance [Empagliflozin] Other (comments)     Yeast infection     Review of Systems:  - Eyes: no blurry vision or double vision  - Cardiovascular: no chest pain  - Respiratory: no shortness of breath  - Musculoskeletal: no myalgias  - Neurological: no numbness/tingling in extremities    Physical Examination:  Blood pressure 130/76, pulse 67, height 5' 11\" (1.803 m), weight 215 lb 3.2 oz (97.6 kg).   - General: pleasant, no distress, good eye contact   - Neck: no carotid bruits  - Cardiovascular: regular, normal rate, nl s1 and s2, no m/r/g,   - Respiratory: clear bilaterally  - Integumentary: no edema,   - Psychiatric: normal mood and affect         Diabetic foot exam performed by Deandre Melissa MD     Measurement  Response Nurse Comment Physician Comment   Monofilament  R - reduced sensation with micro filament  L - reduced sensation with micro filament     Pulse DP R - 2+ (normal)  L - 2+ (normal)     Pulse TP R - absent  L - absent     Structural deformity R - None  L - None     Skin Integrity / Deformity R - Mild - callus  L - Mild - callus        Reviewed by:                 Data Reviewed: Component      Latest Ref Rng & Units 8/30/2017 8/30/2017           8:26 AM  8:26 AM   Glucose      65 - 99 mg/dL 96    BUN      8 - 27 mg/dL 15    Creatinine      0.76 - 1.27 mg/dL 0.73 (L)    GFR est non-AA      >59 mL/min/1.73 98    GFR est AA      >59 mL/min/1.73 113    BUN/Creatinine ratio      10 - 24 21    Sodium      134 - 144 mmol/L 139    Potassium      3.5 - 5.2 mmol/L 4.2    Chloride      96 - 106 mmol/L 98    CO2      18 - 29 mmol/L 27    Calcium      8.6 - 10.2 mg/dL 9.0    Hemoglobin A1c, (calculated)      4.8 - 5.6 %  9.9 (H)   Estimated average glucose      mg/dL  237       Assessment/Plan:     1. Type II or unspecified type diabetes mellitus without mention of complication, uncontrolled ([facility]): his most recent Hgb A1c was 9.9% in 9/17 up from 9.4% in 3/17 down from 10.6% in 10/16 up from 9.9% in 4/16 down from 11.4% in 1/16 up from 10.2% in 9/15 up from 10% in 1/15. A1c is back up off the jardiance which he couldn't tolerate due to yeast infections but previously did fine with Janey Good so will try to get this approved. Hopes to get more serious with exercise so hopefully he'll be able to start cutting back on his insulin. - cont metformin  mg tabs 2 tabs bid  - cont glipizide ER 10 mg bid  - restart farxiga 10 mg daily,   - cont toujeo 40 units daily  - check bs twice daily at alternating times  - foot exam done 9/17  - optho UTD 1/17  - Given DM, Goal LDL < 100, non-HDL < 130, and TG < 150. LDL 79 in 1/15 and 68 in 9/15 and 70 in 4/16 and 94 in 3/17 not on meds  - microalbumin nl 10/16  - check A1c and cmp and lipids and microalbumin prior to next visit      2. Unspecified essential hypertension: his BP was at goal < 140/90  -  cont current regimen for now      3. Unspecified vitamin D deficiency: Level was 28.1 in 9/15 and with taking 1000 units daily up to 37 in 1/16. Down to 29 in 10/16 so increased to 2000 units daily and up to 41 in 3/17.  - cont vitamin D 2000 units daily  - check Vitamin D 25-OH level prior to next visit        Patient Instructions   1) We will restart the farxiga 10 mg every morning. I sent a supply to the pharmacy but this will require an authorization and we'll work on it. Take the samples for the next 2 weeks and we'll let you know when it's approved. 2) Do your best to focus on the meals that are causing you to spike over 180 when checked 2 hours after a meal and limit your portions of these foods.     3) If you restart the farxiga and your morning sugars are coming down under 100, then cut back by 2 units as needed on the toujeo. 4) Your blood pressure is at goal but I would check a few times in the coming months at the local pharmacy to ensure it's staying 140/90. Follow-up Disposition:  Return in about 5 months (around 2/15/2018).     Copy sent to:  Dr. Delma Ivey

## 2017-09-15 NOTE — PROGRESS NOTES
Goals Addressed             Most Recent     Patient verbalizes understanding of self -management goals of living with Diabetes. On track (2016)             17  Per MD, discontinue Jardiance and increase Toujeo to 36 units nightly. YW    17  Attempted to contact patient, follow up for chronic condition of diabetes with blood sugar readings and insulin regimen, no answer, left voicemail message to return telephone call. [facility]    17  Patient called office with most recent blood sugar readings. [facility]    17  Raudel Smith MD at 17 1548        Status: Signed            Please let him know that these readings look pretty good but to keep his sugars under 130 everyday, I would recommend increasing the toujeo to 40 units daily now that he is off the jardiance. NN called patient, left voicemail message with the above MD instruction. 17  Patient left NN voicemail message with most recent blood sugar readings. NN recorded readings and forwarded to MD for review. YW    17  Spoke to patient today, verified patient's name, address and . Patient states doing well, no problems or concerns noted. Patient states has begun exercising daily, doing the stationary bike and light weights. Patient states exercises from 30-60 minutes in the evening after work. Patient states taking medications as prescribed. Patient states having a follow up appointment on 9/15/17. NN encouraged patient to continue recording blood sugar readings and bring readings to next follow up appointment. Patient states understanding and agreement. YW    9/15/17  Patient in office today for follow up appointment. Patient states doing well, starting to increase exercise, monitoring diet and taking medications as prescribed. Patient will start Carol Michelle per MD to help with diabetes management.   Patient was provided [place] savings card for $0.  Patient states will call NN, if having problems with obtaining the Farxiga medication. Patient test blood sugar readings 1-2 times daily, record and send to office for MD review.   Viky Avila

## 2017-09-29 ENCOUNTER — PATIENT OUTREACH (OUTPATIENT)
Dept: ENDOCRINOLOGY | Age: 62
End: 2017-09-29

## 2017-10-16 ENCOUNTER — PATIENT OUTREACH (OUTPATIENT)
Dept: ENDOCRINOLOGY | Age: 62
End: 2017-10-16

## 2017-11-17 ENCOUNTER — PATIENT OUTREACH (OUTPATIENT)
Dept: ENDOCRINOLOGY | Age: 62
End: 2017-11-17

## 2017-11-17 NOTE — PROGRESS NOTES
Goals Addressed             Most Recent     Patient verbalizes understanding of self -management goals of living with Diabetes. On track (2016)             17  Per MD, discontinue Jardiance and increase Toujeo to 36 units nightly. YW    17  Attempted to contact patient, follow up for chronic condition of diabetes with blood sugar readings and insulin regimen, no answer, left voicemail message to return telephone call. [facility]    17  Patient called office with most recent blood sugar readings. [facility]    17  Diamond Finch MD at 17 1548        Status: Signed            Please let him know that these readings look pretty good but to keep his sugars under 130 everyday, I would recommend increasing the toujeo to 40 units daily now that he is off the jardiance. NN called patient, left voicemail message with the above MD instruction. 17  Patient left NN voicemail message with most recent blood sugar readings. NN recorded readings and forwarded to MD for review. YW    17  Spoke to patient today, verified patient's name, address and . Patient states doing well, no problems or concerns noted. Patient states has begun exercising daily, doing the stationary bike and light weights. Patient states exercises from 30-60 minutes in the evening after work. Patient states taking medications as prescribed. Patient states having a follow up appointment on 9/15/17. NN encouraged patient to continue recording blood sugar readings and bring readings to next follow up appointment. Patient states understanding and agreement. YW    9/15/17  Patient in office today for follow up appointment. Patient states doing well, starting to increase exercise, monitoring diet and taking medications as prescribed. Patient will start Ike End per MD to help with diabetes management.   Patient was provided [place] savings card for $0.  Patient states will call NN, if having problems with obtaining the Farxiga medication. Patient test blood sugar readings 1-2 times daily, record and send to office for MD review. YW    9/29/17  Attempted to contact patient, follow up for chronic condition of diabetes with blood sugar readings, insulin and Farxiga, no answer, left voicemail message to return telephone call. YW    10/16/17  Attempted to contact patient, follow up for chronic condition of diabetes with blood sugar readings, insulin and Farxiga medication. No answer, left voicemail message to return telephone call. YW    11/17/17  Attempted to contact patient, follow up for chronic condition of diabetes with blood sugar readings and insulin regimen, no answer, left voicemail message to return telephone call.   Michelle Huynh

## 2017-12-21 ENCOUNTER — PATIENT OUTREACH (OUTPATIENT)
Dept: ENDOCRINOLOGY | Age: 62
End: 2017-12-21

## 2017-12-21 NOTE — PROGRESS NOTES
Goals Addressed             Most Recent     Patient verbalizes understanding of self -management goals of living with Diabetes. On track (2016)             17  Per MD, discontinue Jardiance and increase Toujeo to 36 units nightly. YW    17  Attempted to contact patient, follow up for chronic condition of diabetes with blood sugar readings and insulin regimen, no answer, left voicemail message to return telephone call. [facility]    17  Patient called office with most recent blood sugar readings. [facility]    17  Irene Augustine MD at 17 1548        Status: Signed            Please let him know that these readings look pretty good but to keep his sugars under 130 everyday, I would recommend increasing the toujeo to 40 units daily now that he is off the jardiance. NN called patient, left voicemail message with the above MD instruction. 17  Patient left NN voicemail message with most recent blood sugar readings. NN recorded readings and forwarded to MD for review. YW    17  Spoke to patient today, verified patient's name, address and . Patient states doing well, no problems or concerns noted. Patient states has begun exercising daily, doing the stationary bike and light weights. Patient states exercises from 30-60 minutes in the evening after work. Patient states taking medications as prescribed. Patient states having a follow up appointment on 9/15/17. NN encouraged patient to continue recording blood sugar readings and bring readings to next follow up appointment. Patient states understanding and agreement. YW    9/15/17  Patient in office today for follow up appointment. Patient states doing well, starting to increase exercise, monitoring diet and taking medications as prescribed. Patient will start Kelsey per MD to help with diabetes management.   Patient was provided [place] savings card for $0.  Patient states will call NN, if having problems with obtaining the Farxiga medication. Patient test blood sugar readings 1-2 times daily, record and send to office for MD review. YW    17  Attempted to contact patient, follow up for chronic condition of diabetes with blood sugar readings, insulin and Farxiga, no answer, left voicemail message to return telephone call. YW    10/16/17  Attempted to contact patient, follow up for chronic condition of diabetes with blood sugar readings, insulin and Farxiga medication. No answer, left voicemail message to return telephone call. YW    17  Attempted to contact patient, follow up for chronic condition of diabetes with blood sugar readings and insulin regimen, no answer, left voicemail message to return telephone call. YW    17  Spoke to patient today, verified patient's name, address and . Patient states doing okay, no problems and concerns noted. NN actively listened to patient. Patient states has not been consistent with blood sugar readings, but is still taking his insulin and other diabetic medications. NN encouraged patient to stay consistent as much as possible, record blood sugar readings and send to office for MD review. Patient states agreement and understanding.

## 2018-01-19 ENCOUNTER — TELEPHONE (OUTPATIENT)
Dept: ENDOCRINOLOGY | Age: 63
End: 2018-01-19

## 2018-01-19 NOTE — TELEPHONE ENCOUNTER
Teodoro Lennox,    Please let him know I received his most recent set of blood sugar readings and they look very good and he is just having a few spikes over 180. He should continue to monitor twice daily (fasting and 2 hours after one of his meals) and pay attention to the foods that are keeping him over 180. He should bring more readings to his next visit in February.

## 2018-01-22 ENCOUNTER — PATIENT OUTREACH (OUTPATIENT)
Dept: ENDOCRINOLOGY | Age: 63
End: 2018-01-22

## 2018-02-13 LAB
25(OH)D3+25(OH)D2 SERPL-MCNC: 41.4 NG/ML (ref 30–100)
ALBUMIN SERPL-MCNC: 4.3 G/DL (ref 3.6–4.8)
ALBUMIN/CREAT UR: <3.3 MG/G CREAT (ref 0–30)
ALBUMIN/GLOB SERPL: 1.3 {RATIO} (ref 1.2–2.2)
ALP SERPL-CCNC: 72 IU/L (ref 39–117)
ALT SERPL-CCNC: 12 IU/L (ref 0–44)
AST SERPL-CCNC: 15 IU/L (ref 0–40)
BILIRUB SERPL-MCNC: 0.2 MG/DL (ref 0–1.2)
BUN SERPL-MCNC: 15 MG/DL (ref 8–27)
BUN/CREAT SERPL: 18 (ref 10–24)
CALCIUM SERPL-MCNC: 9.1 MG/DL (ref 8.6–10.2)
CHLORIDE SERPL-SCNC: 98 MMOL/L (ref 96–106)
CHOLEST SERPL-MCNC: 160 MG/DL (ref 100–199)
CO2 SERPL-SCNC: 26 MMOL/L (ref 18–29)
CREAT SERPL-MCNC: 0.84 MG/DL (ref 0.76–1.27)
CREAT UR-MCNC: 91.4 MG/DL
EST. AVERAGE GLUCOSE BLD GHB EST-MCNC: 217 MG/DL
GFR SERPLBLD CREATININE-BSD FMLA CKD-EPI: 107 ML/MIN/1.73
GFR SERPLBLD CREATININE-BSD FMLA CKD-EPI: 93 ML/MIN/1.73
GLOBULIN SER CALC-MCNC: 3.2 G/DL (ref 1.5–4.5)
GLUCOSE SERPL-MCNC: 95 MG/DL (ref 65–99)
HBA1C MFR BLD: 9.2 % (ref 4.8–5.6)
HDLC SERPL-MCNC: 52 MG/DL
INTERPRETATION, 910389: NORMAL
LDLC SERPL CALC-MCNC: 100 MG/DL (ref 0–99)
Lab: NORMAL
MICROALBUMIN UR-MCNC: <3 UG/ML
POTASSIUM SERPL-SCNC: 4.4 MMOL/L (ref 3.5–5.2)
PROT SERPL-MCNC: 7.5 G/DL (ref 6–8.5)
SODIUM SERPL-SCNC: 139 MMOL/L (ref 134–144)
TRIGL SERPL-MCNC: 42 MG/DL (ref 0–149)
VLDLC SERPL CALC-MCNC: 8 MG/DL (ref 5–40)

## 2018-02-16 ENCOUNTER — OFFICE VISIT (OUTPATIENT)
Dept: ENDOCRINOLOGY | Age: 63
End: 2018-02-16

## 2018-02-16 ENCOUNTER — PATIENT OUTREACH (OUTPATIENT)
Dept: ENDOCRINOLOGY | Age: 63
End: 2018-02-16

## 2018-02-16 VITALS
RESPIRATION RATE: 16 BRPM | WEIGHT: 213 LBS | HEIGHT: 71 IN | HEART RATE: 69 BPM | SYSTOLIC BLOOD PRESSURE: 144 MMHG | DIASTOLIC BLOOD PRESSURE: 67 MMHG | OXYGEN SATURATION: 97 % | BODY MASS INDEX: 29.82 KG/M2

## 2018-02-16 DIAGNOSIS — I10 ESSENTIAL HYPERTENSION, BENIGN: ICD-10-CM

## 2018-02-16 DIAGNOSIS — E55.9 VITAMIN D DEFICIENCY: ICD-10-CM

## 2018-02-16 RX ORDER — GLIPIZIDE 10 MG/1
10 TABLET, FILM COATED, EXTENDED RELEASE ORAL 2 TIMES DAILY
Qty: 30 TAB | Refills: 3 | Status: CANCELLED | OUTPATIENT
Start: 2018-02-16

## 2018-02-16 NOTE — PROGRESS NOTES
Chief Complaint   Patient presents with    Diabetes     5 month f/o PCP and pharmacy verified     History of Present Illness: Pramod Kang is a 57 y.o. male here for follow up of diabetes. Weight down 2 lbs since last visit in 9/17. Has been back on the farxiga and has tolerated this better than the jardiance with less  side effects like penile discharge an scrotal itching. He has decreased his toujeo from 40 units to 38 units at night sometime since last visit. Fasting sugars are mostly between  but does have some in the 140-150s. Before dinner are  but does have some in the 150-220 range. Not checking after meals so will focus on this. Hasn't missed any BP meds. Current Outpatient Prescriptions   Medication Sig    insulin glargine (TOUJEO SOLOSTAR U-300 INSULIN) 300 unit/mL (1.5 mL) inpn 38 Units by SubCUTAneous route nightly. Dose change 2/16/18--updated med list--did not send prescription to the pharmacy    dapagliflozin (FARXIGA) 10 mg tab tablet Take 1 Tab by mouth daily. Replaces jardiance    BD INSULIN PEN NEEDLE UF MINI 31 gauge x 3/16\" ndle USE  ONCE DAILY    cholecalciferol, vitamin D3, (VITAMIN D3) 2,000 unit tab Take  by mouth.  metFORMIN ER (GLUCOPHAGE XR) 500 mg tablet Take 4 tabs daily    BLOOD-GLUCOSE METER (RELION PRIME METER) by Does Not Apply route.  JORGE PEN NEEDLE 32 gauge x 5/32\" ndle Use as directed once daily    CINNAMON BARK (CINNAMON PO) Take  by mouth.  DOCOSAHEXANOIC ACID/EPA (FISH OIL PO) Take  by mouth.  CARTIA  mg ER capsule Take 240 mg by mouth daily.  losartan-hydrochlorothiazide (HYZAAR) 100-25 mg per tablet Take 1 Tab by mouth daily. Indications: dose is 100-12.5mg daily    aspirin 81 mg chewable tablet Take 81 mg by mouth daily.  glipiZIDE SR (GLUCOTROL) 10 mg CR tablet Take 10 mg by mouth two (2) times a day. No current facility-administered medications for this visit.       Allergies   Allergen Reactions    Jardiance [Empagliflozin] Other (comments)     Yeast infection     Review of Systems:  - Eyes: no blurry vision or double vision  - Cardiovascular: no chest pain  - Respiratory: no shortness of breath  - Musculoskeletal: no myalgias  - Neurological: no numbness/tingling in extremities    Physical Examination:  Blood pressure 144/67, pulse 69, resp. rate 16, height 5' 11\" (1.803 m), weight 213 lb (96.6 kg), SpO2 97 %. - General: pleasant, no distress, good eye contact   - Neck: no carotid bruits  - Cardiovascular: regular, normal rate, nl s1 and s2, no m/r/g,   - Respiratory: clear bilaterally  - Integumentary: no edema,   - Psychiatric: normal mood and affect    Data Reviewed:   Component      Latest Ref Rng & Units 2/12/2018 2/12/2018 2/12/2018 2/12/2018           8:11 AM  8:11 AM  8:11 AM  8:11 AM   Glucose      65 - 99 mg/dL  95     BUN      8 - 27 mg/dL  15     Creatinine      0.76 - 1.27 mg/dL  0.84     GFR est non-AA      >59 mL/min/1.73  93     GFR est AA      >59 mL/min/1.73  107     BUN/Creatinine ratio      10 - 24  18     Sodium      134 - 144 mmol/L  139     Potassium      3.5 - 5.2 mmol/L  4.4     Chloride      96 - 106 mmol/L  98     CO2      18 - 29 mmol/L  26     Calcium      8.6 - 10.2 mg/dL  9.1     Protein, total      6.0 - 8.5 g/dL  7.5     Albumin      3.6 - 4.8 g/dL  4.3     GLOBULIN, TOTAL      1.5 - 4.5 g/dL  3.2     A-G Ratio      1.2 - 2.2  1.3     Bilirubin, total      0.0 - 1.2 mg/dL  0.2     Alk. phosphatase      39 - 117 IU/L  72     AST      0 - 40 IU/L  15     ALT (SGPT)      0 - 44 IU/L  12     Cholesterol, total      100 - 199 mg/dL   160    Triglyceride      0 - 149 mg/dL   42    HDL Cholesterol      >39 mg/dL   52    VLDL, calculated      5 - 40 mg/dL   8    LDL, calculated      0 - 99 mg/dL   100 (H)    Creatinine, urine      Not Estab. mg/dL    91.4   Microalbumin, urine      Not Estab. ug/mL    <3.0   Microalbumin/Creat.  Ratio      0.0 - 30.0 mg/g creat    <3.3   VITAMIN D, 25-HYDROXY      30.0 - 100.0 ng/mL 41.4        Component      Latest Ref Rng & Units 2/12/2018           8:11 AM   Hemoglobin A1c, (calculated)      4.8 - 5.6 % 9.2 (H)   Estimated average glucose      mg/dL 217       Assessment/Plan:     1. Type II or unspecified type diabetes mellitus without mention of complication, uncontrolled ([facility] [address]): his most recent Hgb A1c was 9.2% in 2/18 down from 9.9% in 9/17 up from 9.4% in 3/17 down from 10.6% in 10/16 up from 9.9% in 4/16 down from 11.4% in 1/16 up from 10.2% in 9/15 up from 10% in 1/15. A1c is still high due to post meal spikes so will focus on these and slightly go back up on insulin. - cont metformin  mg tabs 2 tabs bid  - cont glipizide ER 10 mg bid  - cont farxiga 10 mg daily,   - increase toujeo back to 40 units daily  - check bs twice daily at alternating times  - foot exam done 9/17  - optho UTD 1/17  - Given DM, Goal LDL < 100, non-HDL < 130, and TG < 150. LDL 79 in 1/15 and 68 in 9/15 and 70 in 4/16 and 94 in 3/17 not on meds. Up to 100 in 2/18  - microalbumin nl 2/18  - check A1c and cmp and lipids prior to next visit      2. Unspecified essential hypertension: his BP was just above goal < 140/90 but was at goal last time so won't make any changes  -  cont current regimen for now      3. Unspecified vitamin D deficiency: Level was 28.1 in 9/15 and with taking 1000 units daily up to 37 in 1/16. Down to 29 in 10/16 so increased to 2000 units daily and up to 41 in 3/17 and in 2/18  - cont vitamin D 2000 units daily  - check Vitamin D 25-OH level in 2/19        Patient Instructions   1) Increase the toujeo back to 40 units at night. 2) Check blood sugars twice daily. One time should always be fasting (goal is ). The other time should be 2 hours after a meal (goal is less than 180). Alternate one meal a day to check (example: one day check after breakfast, one day after lunch, one day after dinner).   Write down what you ate if your blood sugar is more than 180 so you can know to cut back or cut out this food from your diet. 3) Your A1c is slowly coming down but I think it's still high due to spikes after meals. 4) Your LDL (bad cholesterol) is 100 and goal is under 100 so you are right at the level where we need you to be. 5) Your blood pressure was higher today but was fine last time so I won't make any changes. Follow-up Disposition:  Return in about 5 months (around 7/16/2018).     Copy sent to:  Dr. Anuradha Hester

## 2018-02-16 NOTE — PATIENT INSTRUCTIONS
1) Increase the toujeo back to 40 units at night. 2) Check blood sugars twice daily. One time should always be fasting (goal is ). The other time should be 2 hours after a meal (goal is less than 180). Alternate one meal a day to check (example: one day check after breakfast, one day after lunch, one day after dinner). Write down what you ate if your blood sugar is more than 180 so you can know to cut back or cut out this food from your diet. 3) Your A1c is slowly coming down but I think it's still high due to spikes after meals. 4) Your LDL (bad cholesterol) is 100 and goal is under 100 so you are right at the level where we need you to be. 5) Your blood pressure was higher today but was fine last time so I won't make any changes.

## 2018-02-16 NOTE — PROGRESS NOTES
Goals Addressed             Most Recent     Patient verbalizes understanding of self -management goals of living with Diabetes. On track (2016)             17  Per MD, discontinue Jardiance and increase Toujeo to 36 units nightly. YW    17  Attempted to contact patient, follow up for chronic condition of diabetes with blood sugar readings and insulin regimen, no answer, left voicemail message to return telephone call. [facility]    17  Patient called office with most recent blood sugar readings. [facility]    17  Art Murdock MD at 17 1548        Status: Signed            Please let him know that these readings look pretty good but to keep his sugars under 130 everyday, I would recommend increasing the toujeo to 40 units daily now that he is off the jardiance. NN called patient, left voicemail message with the above MD instruction. 17  Patient left NN voicemail message with most recent blood sugar readings. NN recorded readings and forwarded to MD for review. YW    17  Spoke to patient today, verified patient's name, address and . Patient states doing well, no problems or concerns noted. Patient states has begun exercising daily, doing the stationary bike and light weights. Patient states exercises from 30-60 minutes in the evening after work. Patient states taking medications as prescribed. Patient states having a follow up appointment on 9/15/17. NN encouraged patient to continue recording blood sugar readings and bring readings to next follow up appointment. Patient states understanding and agreement. YW    9/15/17  Patient in office today for follow up appointment. Patient states doing well, starting to increase exercise, monitoring diet and taking medications as prescribed. Patient will start Melinda Edwards per MD to help with diabetes management.   Patient was provided [place] savings card for $0.  Patient states will call NN, if having problems with obtaining the Farxiga medication. Patient test blood sugar readings 1-2 times daily, record and send to office for MD review. YW    17  Attempted to contact patient, follow up for chronic condition of diabetes with blood sugar readings, insulin and Farxiga, no answer, left voicemail message to return telephone call. YW    10/16/17  Attempted to contact patient, follow up for chronic condition of diabetes with blood sugar readings, insulin and Farxiga medication. No answer, left voicemail message to return telephone call. YW    17  Attempted to contact patient, follow up for chronic condition of diabetes with blood sugar readings and insulin regimen, no answer, left voicemail message to return telephone call. YW    17  Spoke to patient today, verified patient's name, address and . Patient states doing okay, no problems and concerns noted. NN actively listened to patient. Patient states has not been consistent with blood sugar readings, but is still taking his insulin and other diabetic medications. NN encouraged patient to stay consistent as much as possible, record blood sugar readings and send to office for MD review. Patient states agreement and understanding. 18  Margaux Irby MD 3 days ago              [place],     Please let him know I received his most recent set of blood sugar readings and they look very good and he is just having a few spikes over 180. He should continue to monitor twice daily (fasting and 2 hours after one of his meals) and pay attention to the foods that are keeping him over 180. He should bring more readings to his next visit in February.                      NN called patient, no answer. Left voicemail message with the above MD comment and instruction. 18  Patient attended follow up appointment today, 18. Patients last hemoglobin A1C was 9.2 on 18, down from 9.9 in 2017.

## 2018-02-16 NOTE — PROGRESS NOTES
Chief Complaint   Patient presents with    Diabetes     5 month f/o PCP and pharmacy verified       1. Have you been to the ER, urgent care clinic since your last visit? Hospitalized since your last visit? no  2. Have you seen or consulted any other health care providers outside of the [address] [place] [address] since your last visit? Include any pap smears or colon screening.   no

## 2018-02-16 NOTE — MR AVS SNAPSHOT
[address] [facility] [address] [address] [phone] Patient: Wes Bai MRN: [id number] [facility]:[birth year] Visit Information Date & Time Provider Department Dept. Phone Encounter #  
 2/16/2018  2:30 PM Luther Naranjo, [address] [place] [address] Diabetes and Endocrinology [phone] Follow-up Instructions Return in about 5 months (around 7/16/2018). Upcoming Health Maintenance Date Due Hepatitis C Screening 1955 Pneumococcal 19-64 Medium Risk (1 of 1 - PPSV23) 8/5/1972 DTaP/Tdap/Td series (1 - Tdap) 8/5/1974 FOBT Q 1 YEAR AGE 50-75 8/5/2003 ZOSTER VACCINE AGE 60> 6/5/2013 Influenza Age 5 to Adult 8/1/2017 EYE EXAM RETINAL OR DILATED Q1 1/20/2018 HEMOGLOBIN A1C Q6M 8/12/2018 FOOT EXAM Q1 9/15/2018 MICROALBUMIN Q1 2/12/2019 LIPID PANEL Q1 2/12/2019 Allergies as of 2/16/2018  Review Complete On: 2/16/2018 By: Luther Naranjo MD  
  
 Severity Noted Reaction Type Reactions Jardiance [Empagliflozin]  07/12/2017    Other (comments) Yeast infection Current Immunizations  Never Reviewed No immunizations on file. Not reviewed this visit You Were Diagnosed With   
  
 Codes Comments Uncontrolled type 2 diabetes mellitus without complication, with long-term current use of insulin ([facility]    -  Primary ICD-10-CM: E11.65, Z79.4 ICD-9-CM: 250.02, V58.67 Essential hypertension, benign     ICD-10-CM: I10 
ICD-9-CM: 401.1 Vitamin D deficiency     ICD-10-CM: E55.9 ICD-9-CM: 268.9 Vitals BP Pulse Resp Height(growth percentile) Weight(growth percentile) SpO2  
 144/67 (BP 1 Location: Left arm, BP Patient Position: Sitting) 69 16 5' 11\" (1.803 m) 213 lb (96.6 kg) 97% BMI Smoking Status 29.71 kg/m2 Never Smoker Vitals History BMI and BSA Data  Body Mass Index Body Surface Area  
 29.71 kg/m 2 2.2 m 2  
  
  
 Preferred Pharmacy Pharmacy Name Phone [phone] [address] [facility] [phone] Your Updated Medication List  
  
   
This list is accurate as of: 2/16/18  3:39 PM.  Always use your most recent med list.  
  
  
  
  
 aspirin 81 mg chewable tablet Take 81 mg by mouth daily. BD INSULIN PEN NEEDLE UF MINI 31 gauge x 3/16\" Ndle Generic drug:  Insulin Needles (Disposable) USE  ONCE DAILY CARTIA  mg ER capsule Generic drug:  dilTIAZem CD Take 240 mg by mouth daily. CINNAMON PO Take  by mouth. dapagliflozin 10 mg Tab tablet Commonly known as:  [employer] Take 1 Tab by mouth daily. Replaces jardiance FISH OIL PO Take  by mouth. glipiZIDE SR 10 mg CR tablet Commonly known as:  GLUCOTROL XL Take 10 mg by mouth two (2) times a day. insulin glargine 300 unit/mL (1.5 mL) Inpn Commonly known as:  TOUJEO SOLOSTAR U-300 INSULIN  
40 Units by SubCUTAneous route nightly. Dose change 2/16/18--updated med list--did not send prescription to the pharmacy  
  
 losartan-hydroCHLOROthiazide 100-25 mg per tablet Commonly known as:  HYZAAR Take 1 Tab by mouth daily. Indications: dose is 100-12.5mg daily  
  
 metFORMIN  mg tablet Commonly known as:  GLUCOPHAGE XR Take 4 tabs daily RELION PRIME METER  
by Does Not Apply route. VITAMIN D3 2,000 unit Tab Generic drug:  cholecalciferol (vitamin D3) Take  by mouth. We Performed the Following HEMOGLOBIN A1C WITH EAG [20740 CPT(R)] LIPID PANEL [92661 CPT(R)] METABOLIC PANEL, COMPREHENSIVE [81424 CPT(R)] Follow-up Instructions Return in about 5 months (around 7/16/2018). Patient Instructions 1) Increase the toujeo back to 40 units at night. 2) Check blood sugars twice daily. One time should always be fasting (goal is ).   The other time should be 2 hours after a meal (goal is less than 180). Alternate one meal a day to check (example: one day check after breakfast, one day after lunch, one day after dinner). Write down what you ate if your blood sugar is more than 180 so you can know to cut back or cut out this food from your diet. 3) Your A1c is slowly coming down but I think it's still high due to spikes after meals. 4) Your LDL (bad cholesterol) is 100 and goal is under 100 so you are right at the level where we need you to be. 5) Your blood pressure was higher today but was fine last time so I won't make any changes. Introducing [facility]! Grisel Reilly introduces [employer] patient portal. Now you can access parts of your medical record, email your doctor's office, and request medication refills online. 1. In your internet browser, go to [email or web address] 2. Click on the First Time User? Click Here link in the Sign In box. You will see the New Member Sign Up page. 3. Enter your [employer] Access Code exactly as it appears below. You will not need to use this code after youve completed the sign-up process. If you do not sign up before the expiration date, you must request a new code. · [employer] Access Code: [email or web address] Expires: 5/17/2018  3:27 PM 
 
4. Enter the last four digits of your Social Security Number (xxxx) and Date of Birth (mm/dd/yyyy) as indicated and click Submit. You will be taken to the next sign-up page. 5. Create a [employer] ID. This will be your Fin[employer] login ID and cannot be changed, so think of one that is secure and easy to remember. 6. Create a [employer] password. You can change your password at any time. 7. Enter your Password Reset Question and Answer. This can be used at a later time if you forget your password. 8. Enter your e-mail address. You will receive e-mail notification when new information is available in [address] 9. Click Sign Up.  You can now view and download portions of your medical record. 10. Click the Download Summary menu link to download a portable copy of your medical information. If you have questions, please visit the Frequently Asked Questions section of the [employer] website. Remember, [employer] is NOT to be used for urgent needs. For medical emergencies, dial 911. Now available from your iPhone and Android! Please provide this summary of care documentation to your next provider. Your primary care clinician is listed as Abimael Lemon. If you have any questions after today's visit, please call [phone].

## 2018-03-28 RX ORDER — INSULIN GLARGINE 300 U/ML
INJECTION, SOLUTION SUBCUTANEOUS
Qty: 6 PEN | Refills: 3 | Status: SHIPPED | OUTPATIENT
Start: 2018-03-28 | End: 2018-10-30

## 2018-03-28 RX ORDER — METFORMIN HYDROCHLORIDE 500 MG/1
TABLET, EXTENDED RELEASE ORAL
Qty: 360 TAB | Refills: 3 | Status: SHIPPED | OUTPATIENT
Start: 2018-03-28 | End: 2019-04-13 | Stop reason: SDUPTHER

## 2018-03-29 ENCOUNTER — PATIENT OUTREACH (OUTPATIENT)
Dept: ENDOCRINOLOGY | Age: 63
End: 2018-03-29

## 2018-03-29 NOTE — PROGRESS NOTES
Goals Addressed             Most Recent     Patient verbalizes understanding of self -management goals of living with Diabetes. On track (2016)             17  Per MD, discontinue Jardiance and increase Toujeo to 36 units nightly. YW    17  Attempted to contact patient, follow up for chronic condition of diabetes with blood sugar readings and insulin regimen, no answer, left voicemail message to return telephone call. [facility]    17  Patient called office with most recent blood sugar readings. [facility]    17  Poornima Chawla MD at 17 1548        Status: Signed            Please let him know that these readings look pretty good but to keep his sugars under 130 everyday, I would recommend increasing the toujeo to 40 units daily now that he is off the jardiance. NN called patient, left voicemail message with the above MD instruction. 17  Patient left NN voicemail message with most recent blood sugar readings. NN recorded readings and forwarded to MD for review. YW    17  Spoke to patient today, verified patient's name, address and . Patient states doing well, no problems or concerns noted. Patient states has begun exercising daily, doing the stationary bike and light weights. Patient states exercises from 30-60 minutes in the evening after work. Patient states taking medications as prescribed. Patient states having a follow up appointment on 9/15/17. NN encouraged patient to continue recording blood sugar readings and bring readings to next follow up appointment. Patient states understanding and agreement. YW    9/15/17  Patient in office today for follow up appointment. Patient states doing well, starting to increase exercise, monitoring diet and taking medications as prescribed. Patient will start Nette Jean per MD to help with diabetes management.   Patient was provided [place] savings card for $0.  Patient states will call NN, if having problems with obtaining the Farxiga medication. Patient test blood sugar readings 1-2 times daily, record and send to office for MD review. YW    17  Attempted to contact patient, follow up for chronic condition of diabetes with blood sugar readings, insulin and Farxiga, no answer, left voicemail message to return telephone call. YW    10/16/17  Attempted to contact patient, follow up for chronic condition of diabetes with blood sugar readings, insulin and Farxiga medication. No answer, left voicemail message to return telephone call. YW    17  Attempted to contact patient, follow up for chronic condition of diabetes with blood sugar readings and insulin regimen, no answer, left voicemail message to return telephone call. YW    17  Spoke to patient today, verified patient's name, address and . Patient states doing okay, no problems and concerns noted. NN actively listened to patient. Patient states has not been consistent with blood sugar readings, but is still taking his insulin and other diabetic medications. NN encouraged patient to stay consistent as much as possible, record blood sugar readings and send to office for MD review. Patient states agreement and understanding. 18  Ruth Belcher MD 3 days ago              Coby,     Please let him know I received his most recent set of blood sugar readings and they look very good and he is just having a few spikes over 180. He should continue to monitor twice daily (fasting and 2 hours after one of his meals) and pay attention to the foods that are keeping him over 180. He should bring more readings to his next visit in February.                      NN called patient, no answer. Left voicemail message with the above MD comment and instruction. 18  Patient attended follow up appointment today, 18.   Patients last hemoglobin A1C was 9.2 on 18, down from 9.9 in 2017.    3/29/18  Patient called, left voicemail message concerning his Toujeo insulin savings card. NN called patient. Left message explaining to patient in order to use the saving card for [facility], he would need to call the telephone number on the savings card and reactive for the year, then  prescription from pharmacy. Patient has nurse navigator's contact information if needed. 3/30/18  Attempted to contact patient, no answer, left voicemail message to return telephone call.

## 2018-04-09 ENCOUNTER — PATIENT OUTREACH (OUTPATIENT)
Dept: ENDOCRINOLOGY | Age: 63
End: 2018-04-09

## 2018-07-03 RX ORDER — PEN NEEDLE, DIABETIC 31 GX5/16"
NEEDLE, DISPOSABLE MISCELLANEOUS
Qty: 100 PEN NEEDLE | Refills: 11 | Status: SHIPPED | OUTPATIENT
Start: 2018-07-03 | End: 2018-10-30 | Stop reason: SDUPTHER

## 2018-07-05 RX ORDER — PEN NEEDLE, DIABETIC 31 GX5/16"
NEEDLE, DISPOSABLE MISCELLANEOUS
Qty: 100 PEN NEEDLE | Refills: 11 | Status: SHIPPED | OUTPATIENT
Start: 2018-07-05 | End: 2020-01-29 | Stop reason: ALTCHOICE

## 2018-07-10 LAB
ALBUMIN SERPL-MCNC: 4.7 G/DL (ref 3.6–4.8)
ALBUMIN/GLOB SERPL: 1.3 {RATIO} (ref 1.2–2.2)
ALP SERPL-CCNC: 85 IU/L (ref 39–117)
ALT SERPL-CCNC: 10 IU/L (ref 0–44)
AST SERPL-CCNC: 18 IU/L (ref 0–40)
BILIRUB SERPL-MCNC: 0.6 MG/DL (ref 0–1.2)
BUN SERPL-MCNC: 13 MG/DL (ref 8–27)
BUN/CREAT SERPL: 14 (ref 10–24)
CALCIUM SERPL-MCNC: 9.9 MG/DL (ref 8.6–10.2)
CHLORIDE SERPL-SCNC: 99 MMOL/L (ref 96–106)
CHOLEST SERPL-MCNC: 148 MG/DL (ref 100–199)
CO2 SERPL-SCNC: 26 MMOL/L (ref 20–29)
CREAT SERPL-MCNC: 0.95 MG/DL (ref 0.76–1.27)
EST. AVERAGE GLUCOSE BLD GHB EST-MCNC: 217 MG/DL
GLOBULIN SER CALC-MCNC: 3.5 G/DL (ref 1.5–4.5)
GLUCOSE SERPL-MCNC: 88 MG/DL (ref 65–99)
HBA1C MFR BLD: 9.2 % (ref 4.8–5.6)
HDLC SERPL-MCNC: 50 MG/DL
INTERPRETATION, 910389: NORMAL
LDLC SERPL CALC-MCNC: 85 MG/DL (ref 0–99)
Lab: NORMAL
POTASSIUM SERPL-SCNC: 4.5 MMOL/L (ref 3.5–5.2)
PROT SERPL-MCNC: 8.2 G/DL (ref 6–8.5)
SODIUM SERPL-SCNC: 139 MMOL/L (ref 134–144)
TRIGL SERPL-MCNC: 64 MG/DL (ref 0–149)
VLDLC SERPL CALC-MCNC: 13 MG/DL (ref 5–40)

## 2018-10-26 LAB
CREATININE, EXTERNAL: 0.86
HBA1C MFR BLD HPLC: 9.4 %
LDL-C, EXTERNAL: 103

## 2018-10-30 ENCOUNTER — OFFICE VISIT (OUTPATIENT)
Dept: ENDOCRINOLOGY | Age: 63
End: 2018-10-30

## 2018-10-30 VITALS
HEART RATE: 74 BPM | DIASTOLIC BLOOD PRESSURE: 82 MMHG | HEIGHT: 71 IN | BODY MASS INDEX: 29.85 KG/M2 | SYSTOLIC BLOOD PRESSURE: 151 MMHG | WEIGHT: 213.2 LBS

## 2018-10-30 DIAGNOSIS — E55.9 VITAMIN D DEFICIENCY: ICD-10-CM

## 2018-10-30 DIAGNOSIS — I10 ESSENTIAL HYPERTENSION, BENIGN: ICD-10-CM

## 2018-10-30 RX ORDER — LOSARTAN POTASSIUM AND HYDROCHLOROTHIAZIDE 12.5; 1 MG/1; MG/1
TABLET ORAL
COMMUNITY
End: 2020-01-29 | Stop reason: SDUPTHER

## 2018-10-30 RX ORDER — SYRINGE AND NEEDLE,INSULIN,1ML 31GX15/64"
SYRINGE, EMPTY DISPOSABLE MISCELLANEOUS
Qty: 200 PEN NEEDLE | Refills: 3 | Status: SHIPPED | OUTPATIENT
Start: 2018-10-30 | End: 2020-01-29 | Stop reason: ALTCHOICE

## 2018-10-30 NOTE — PROGRESS NOTES
Chief Complaint Patient presents with  Diabetes  
  pcp and pharmacy confirmed  Diabetic Foot Exam  
  due  Other  
  eye exam is due History of Present Illness: Svitlana Rodriguez is a 70 y.o. male here for follow up of diabetes. Weight stable since last visit in 2/18. Has been taking 400 mg of ibuprofen 2 times daily to help with pain down both arms and into his hands that has been causing some numbness down the arms. Bought a new mattress to see if this helps. Just saw PCP today and had labs drawn and mentioned he may need to see rheumatology. Retired on 8/31/18. Has been taking 40 units of toujeo in the evening. Has found that after retiring, he has not been drinking sodas as much and is drinking more water and not snacking on honey buns and donuts and chips as much. Didn't bring in any readings for review. Fasting sugars are in the 110-120s. Trying to checking after he eats more often and has seen in the 80-150s but none over 180 that he can recall. BP was a little high today at PCP's office today. Just went on Medicare this summer and now the cost of the toujeo and Ирина Holter is too much as he can't use coupons. Current Outpatient Medications Medication Sig  
 losartan-hydroCHLOROthiazide (HYZAAR) 100-12.5 mg per tablet losartan 100 mg-hydrochlorothiazide 12.5 mg tablet  insulin glargine U-300 conc (TOUJEO SOLOSTAR U-300 INSULIN) 300 unit/mL (1.5 mL) inpn INJECT 40 UNITS SUBCUTANEOUSLY ONCE DAILY IN THE EVENING--Dose change 10/30/18--updated med list--did not send prescription to the pharmacy  BD ULTRA-FINE MINI PEN NEEDLE 31 gauge x 3/16\" ndle USE  ONCE DAILY  metFORMIN ER (GLUCOPHAGE XR) 500 mg tablet TAKE FOUR TABLETS BY MOUTH ONCE DAILY  dapagliflozin (FARXIGA) 10 mg tab tablet Take 1 Tab by mouth daily. Replaces jardiance  cholecalciferol, vitamin D3, (VITAMIN D3) 2,000 unit tab Take  by mouth.  BLOOD-GLUCOSE METER (RELION PRIME METER) by Does Not Apply route.  DOCOSAHEXANOIC ACID/EPA (FISH OIL PO) Take  by mouth.  CARTIA  mg ER capsule Take 240 mg by mouth daily.  aspirin 81 mg chewable tablet Take 81 mg by mouth daily.  glipiZIDE SR (GLUCOTROL) 10 mg CR tablet Take 10 mg by mouth two (2) times a day. No current facility-administered medications for this visit. Allergies Allergen Reactions  Jardiance [Empagliflozin] Other (comments) Yeast infection Review of Systems: - Eyes: no blurry vision or double vision - Cardiovascular: no chest pain - Respiratory: no shortness of breath - Musculoskeletal: no myalgias - Neurological: no numbness/tingling in extremities Physical Examination: 
Blood pressure 151/82, pulse 74, height 5' 11\" (1.803 m), weight 213 lb 3.2 oz (96.7 kg). - General: pleasant, no distress, good eye contact  
- Neck: no carotid bruits - Cardiovascular: regular, normal rate, nl s1 and s2, no m/r/g,  
- Respiratory: clear bilaterally - Integumentary: no edema,  
- Psychiatric: normal mood and affect Diabetic foot exam:  
 
Left Foot: 
 Visual Exam: callous - mild Pulse DP: 2+ (normal) Filament test: reduced sensation Vibratory sensation: absent Right Foot: 
 Visual Exam: callous - mild Pulse DP: 2+ (normal) Filament test: reduced sensation Vibratory sensation: absent Data Reviewed: 10/23/18 
- Hgb A1c 9.4% 
- lipids: total 174 ,  HDL 48, ,  
- ALT 12, AST 14 
- BUN/Cr 12/0.86 
- TSH 1.4 Assessment/Plan: 1. Type II or unspecified type diabetes mellitus without mention of complication, uncontrolled ([facility]): his most recent Hgb A1c was 9.4% in 10/18 up from 9.2% in 2/18 down from 9.9% in 9/17 up from 9.4% in 3/17 down from 10.6% in 10/16 up from 9.9% in 4/16 down from 11.4% in 1/16 up from 10.2% in 9/15 up from 10% in 1/15.   A1c continues to be above goal and cost is now an issue with [employer] since being on Medicare as of 8/18 and likely needs prandial insulin to get to goal so will stop both of these and the glipizide as he'll not need this with being on prandial insulin. I showed him how to use a vial and syringe and will use relion 70/30 insulin at [employer] instead. We spent 40 minutes of face to face time together and > 50% of the time was spent in counseling on diabetes management and determining what changes to make to his insulin regimen.  
- begin 70/30 insulin 25 units before breakfast and dinner 
- stop toujeo and farxiga and glipizide 
- cont metformin  mg tabs 2 tabs bid 
- check bs twice daily at alternating times 
- foot exam done 10/18 
- optho UTD 1/17--due now - Given DM, Goal LDL < 100, non-HDL < 130, and TG < 150. LDL 79 in 1/15 and 68 in 9/15 and 70 in 4/16 and 94 in 3/17 not on meds. Up to 100 in 2/18 and 103 in 10/18 
- microalbumin nl 2/18 
- check A1c and bmp and microalbumin prior to next visit 2. Unspecified essential hypertension: his BP was just above goal < 140/90 but didn't repeat manually 
-  cont current regimen for now 3. Unspecified vitamin D deficiency: Level was 28.1 in 9/15 and with taking 1000 units daily up to 37 in 1/16. Down to 29 in 10/16 so increased to 2000 units daily and up to 41 in 3/17 and in 2/18 
- cont vitamin D 2000 units daily - check Vitamin D 25-OH level prior to next visit Patient Instructions 1) Stop the toujeo and farxiga and glipizide. 2) We will change to relion [employer] brand 70/30 insulin that comes in a vial. Roll the vial in your hand for 10 seconds to mix the insulin prior to injection. 3) Inject 25 units 5-10 minutes before breakfast and before dinner. 4) As long as this dose is keeping your morning sugar between , then this is a good dose. If you are waking up over 130, then you may need a little more at dinner. Try 30 units at dinner. 5) Check your sugar before breakfast and then dose the insulin and then eat. 6) Give me an update within the next few weeks if you are having sugars under 80 in the morning or over 140 in the morning. 7) You can pay cash for the insulin and buy 1 vial at a time = $25 per vial. 
 
 
 
Follow-up Disposition: 
Return in about 5 months (around 3/30/2019). Copy sent to: 
Dr. Mariela Gomez

## 2018-10-30 NOTE — PATIENT INSTRUCTIONS
1) Stop the toujeo and farxiga and glipizide. 2) We will change to relion [employer] brand 70/30 insulin that comes in a vial. Roll the vial in your hand for 10 seconds to mix the insulin prior to injection. 3) Inject 25 units 5-10 minutes before breakfast and before dinner. 4) As long as this dose is keeping your morning sugar between , then this is a good dose. If you are waking up over 130, then you may need a little more at dinner. Try 30 units at dinner. 5) Check your sugar before breakfast and then dose the insulin and then eat. 6) Give me an update within the next few weeks if you are having sugars under 80 in the morning or over 140 in the morning.  
 
7) You can pay cash for the insulin and buy 1 vial at a time = $25 per vial.

## 2018-10-31 ENCOUNTER — PATIENT OUTREACH (OUTPATIENT)
Dept: ENDOCRINOLOGY | Age: 63
End: 2018-10-31

## 2018-10-31 NOTE — PROGRESS NOTES
10/30/18 Patient in office for follow up visit. Patient left voicemal message for NN stating he is unable to afford his diabetic medication since his recent correction. 10/31/18 Attempted to contact patient, no answer left voicemail message to return telephone call.

## 2018-11-12 ENCOUNTER — PATIENT OUTREACH (OUTPATIENT)
Dept: ENDOCRINOLOGY | Age: 63
End: 2018-11-12

## 2018-11-12 NOTE — PROGRESS NOTES
11/12/18 Attempted to contact patient, follow up for chronic condition of diabetes with blood sugar readings and insulin regimen. No answer, left voicemail message to return telephone call.

## 2019-04-13 RX ORDER — METFORMIN HYDROCHLORIDE 500 MG/1
TABLET, EXTENDED RELEASE ORAL
Qty: 360 TAB | Refills: 3 | Status: SHIPPED | OUTPATIENT
Start: 2019-04-13 | End: 2020-05-06

## 2019-06-28 LAB
25(OH)D3+25(OH)D2 SERPL-MCNC: 33.9 NG/ML (ref 30–100)
ALBUMIN/CREAT UR: 5.3 MG/G CREAT (ref 0–30)
BUN SERPL-MCNC: 14 MG/DL (ref 8–27)
BUN/CREAT SERPL: 17 (ref 10–24)
CALCIUM SERPL-MCNC: 9.7 MG/DL (ref 8.6–10.2)
CHLORIDE SERPL-SCNC: 99 MMOL/L (ref 96–106)
CO2 SERPL-SCNC: 26 MMOL/L (ref 20–29)
CREAT SERPL-MCNC: 0.83 MG/DL (ref 0.76–1.27)
CREAT UR-MCNC: 114.7 MG/DL
EST. AVERAGE GLUCOSE BLD GHB EST-MCNC: 298 MG/DL
GLUCOSE SERPL-MCNC: 194 MG/DL (ref 65–99)
HBA1C MFR BLD: 12 % (ref 4.8–5.6)
MICROALBUMIN UR-MCNC: 6.1 UG/ML
POTASSIUM SERPL-SCNC: 4.3 MMOL/L (ref 3.5–5.2)
SODIUM SERPL-SCNC: 139 MMOL/L (ref 134–144)

## 2019-07-01 ENCOUNTER — OFFICE VISIT (OUTPATIENT)
Dept: ENDOCRINOLOGY | Age: 64
End: 2019-07-01

## 2019-07-01 VITALS
HEART RATE: 72 BPM | HEIGHT: 71 IN | SYSTOLIC BLOOD PRESSURE: 150 MMHG | WEIGHT: 216.2 LBS | DIASTOLIC BLOOD PRESSURE: 86 MMHG | BODY MASS INDEX: 30.27 KG/M2

## 2019-07-01 DIAGNOSIS — I10 ESSENTIAL HYPERTENSION, BENIGN: ICD-10-CM

## 2019-07-01 DIAGNOSIS — E55.9 VITAMIN D DEFICIENCY: ICD-10-CM

## 2019-07-01 RX ORDER — FLASH GLUCOSE SCANNING READER
EACH MISCELLANEOUS
Qty: 1 EACH | Refills: 0 | Status: SHIPPED | OUTPATIENT
Start: 2019-07-01 | End: 2020-01-29 | Stop reason: ALTCHOICE

## 2019-07-01 RX ORDER — FLASH GLUCOSE SENSOR
KIT MISCELLANEOUS
Qty: 2 KIT | Refills: 11 | Status: SHIPPED | OUTPATIENT
Start: 2019-07-01 | End: 2020-01-29 | Stop reason: ALTCHOICE

## 2019-07-01 NOTE — PROGRESS NOTES
Chief Complaint   Patient presents with    Diabetes     pcp and pharmacy confiirmed    Other     eye exam is due     History of Present Illness: Jc Garner is a 70 y.o. male here for follow up of diabetes. Weight up 3 lbs since last visit in 10/18. Has been off the farxiga and toujeo and glipizide and has been buying the [employer] relion insulin and has been taking 25 units before breakfast and increased to 30 units before dinner a few months ago. Rarely may miss a dose and on occasion can take after he eats but majority is before eating. Still taking 4 tabs of metformin per day. Had left carpal tunnel and ulnar serve surgery in 3/19 and the pain he was having in the left hand has improved. Needs to have surgery on the right hand but has held on this for now. Hasn't checked his sugars the past 2 months due to numbness in his fingertips. Compliant with losartan and states he gets nervous at the doctor but willing to monitor some home readings. Current Outpatient Medications   Medication Sig    metFORMIN ER (GLUCOPHAGE XR) 500 mg tablet TAKE FOUR TABLETS BY MOUTH ONCE DAILY    losartan-hydroCHLOROthiazide (HYZAAR) 100-12.5 mg per tablet losartan 100 mg-hydrochlorothiazide 12.5 mg tablet    insulin NPH/insulin regular (NOVOLIN 70/30 U-100 INSULIN) 100 unit/mL (70-30) injection Inject 25 units before breakfast and dinner--dispense relion brand    insulin syringe-needle U-100 1/2 mL 31 gauge x 15/64\" syrg Use as directed twice daily--dispense syringes    BD ULTRA-FINE MINI PEN NEEDLE 31 gauge x 3/16\" ndle USE  ONCE DAILY    cholecalciferol, vitamin D3, (VITAMIN D3) 2,000 unit tab Take  by mouth.  BLOOD-GLUCOSE METER (RELION PRIME METER) by Does Not Apply route.  DOCOSAHEXANOIC ACID/EPA (FISH OIL PO) Take  by mouth.  CARTIA  mg ER capsule Take 240 mg by mouth daily.  aspirin 81 mg chewable tablet Take 81 mg by mouth daily.      No current facility-administered medications for this visit. Allergies   Allergen Reactions    Jardiance [Empagliflozin] Other (comments)     Yeast infection     Review of Systems:  - Eyes: no blurry vision or double vision  - Cardiovascular: no chest pain  - Respiratory: no shortness of breath  - Musculoskeletal: no myalgias  - Neurological: no numbness/tingling in extremities    Physical Examination:  Blood pressure 150/86, pulse 72, height 5' 11\" (1.803 m), weight 216 lb 3.2 oz (98.1 kg). - General: pleasant, no distress, good eye contact   - Neck: no carotid bruits  - Cardiovascular: regular, normal rate, nl s1 and s2, no m/r/g,   - Respiratory: clear bilaterally  - Integumentary: no edema,   - Psychiatric: normal mood and affect    Data Reviewed:   Component      Latest Ref Rng & Units 6/27/2019 6/27/2019 6/27/2019 6/27/2019           9:20 AM  9:20 AM  9:20 AM  9:20 AM   Glucose      65 - 99 mg/dL 194 (H)      BUN      8 - 27 mg/dL 14      Creatinine      0.76 - 1.27 mg/dL 0.83      GFR est non-AA      >59 mL/min/1.73 92      GFR est AA      >59 mL/min/1.73 107      BUN/Creatinine ratio      10 - 24 17      Sodium      134 - 144 mmol/L 139      Potassium      3.5 - 5.2 mmol/L 4.3      Chloride      96 - 106 mmol/L 99      CO2      20 - 29 mmol/L 26      Calcium      8.6 - 10.2 mg/dL 9.7      Creatinine, urine      Not Estab. mg/dL    114.7   Microalbumin, urine      Not Estab. ug/mL    6.1   Microalbumin/Creat. Ratio      0.0 - 30.0 mg/g creat    5.3   Hemoglobin A1c, (calculated)      4.8 - 5.6 %  12.0 (H)     Estimated average glucose      mg/dL  298     VITAMIN D, 25-HYDROXY      30.0 - 100.0 ng/mL   33.9        Assessment/Plan:     1.  Type II or unspecified type diabetes mellitus without mention of complication, uncontrolled ([facility]): his most recent Hgb A1c was 12% in 6/19 up from 9.4% in 10/18 up from 9.2% in 2/18 down from 9.9% in 9/17 up from 9.4% in 3/17 down from 10.6% in 10/16 up from 9.9% in 4/16 down from 11.4% in 1/16 up from 10.2% in 9/15 up from 10% in 1/15. A1c is rising despite starting meal time insulin as he has not checked in 2 months. He won't qualify for [employer] but may end up paying cash for this. Gave him a new scale to follow to dose his insulin. - increase 70/30 insulin to 35 units before breakfast and dinner + 5 units for every 50 mg/dl above 150 mg/dl  - cont metformin  mg tabs 2 tabs bid  - check bs twice daily at alternating times  - foot exam done 10/18  - optho UTD 1/17--due now  - Given DM, Goal LDL < 100, non-HDL < 130, and TG < 150. LDL 79 in 1/15 and 68 in 9/15 and 70 in 4/16 and 94 in 3/17 not on meds. Up to 100 in 2/18 and 103 in 10/18  - microalbumin nl 6/19  - check A1c and cmp and lipids prior to next visit      2. Unspecified essential hypertension: his BP was above goal < 140/90   - monitor home blood pressure readings  -  cont current regimen for now      3. Unspecified vitamin D deficiency: Level was 28.1 in 9/15 and with taking 1000 units daily up to 37 in 1/16. Down to 29 in 10/16 so increased to 2000 units daily and up to 41 in 3/17 and in 2/18. Down to 33.9 in 6/19  - cont vitamin D 2000 units daily  - check Vitamin D 25-OH level in 6/20      Patient Instructions   1)  Check your sugar before breakfast and dinner and adjust your insulin based on the scale below:  Blood sugar            Less than 90  Eat, first take 30 units       35 units    151-200  40 units      201-250  45 units     251-300  50 units      301-350  55 units      351-400  60 units    2) If this regimen is not keep your morning sugars between  over the next week, then let me know. 3) Your A1c is 12% up from 9.4% at last check. 4) Your urine and vitamin D and kidney test are normal.    5) Start monitoring blood pressure about 2-3 times per week at alternating times either in the morning or evening after resting for 5 minutes and sitting upright in a chair with your arm at heart level.  Please let me know if you are having readings over 140 on the top number or 90 on the bottom number. Follow-up and Dispositions    · Return in about 3 months (around 10/1/2019) for ok to use 12:10 slot.                Copy sent to:  Dr. Brea Garnica

## 2019-07-01 NOTE — PATIENT INSTRUCTIONS
1)  Check your sugar before breakfast and dinner and adjust your insulin based on the scale below:  Blood sugar            Less than 90  Eat, first take 30 units       35 units    151-200  40 units      201-250  45 units     251-300  50 units      301-350  55 units      351-400  60 units    2) If this regimen is not keep your morning sugars between  over the next week, then let me know. 3) Your A1c is 12% up from 9.4% at last check. 4) Your urine and vitamin D and kidney test are normal.    5) Start monitoring blood pressure about 2-3 times per week at alternating times either in the morning or evening after resting for 5 minutes and sitting upright in a chair with your arm at heart level. Please let me know if you are having readings over 140 on the top number or 90 on the bottom number.

## 2020-01-22 ENCOUNTER — TELEPHONE (OUTPATIENT)
Dept: FAMILY MEDICINE CLINIC | Age: 65
End: 2020-01-22

## 2020-01-22 RX ORDER — DILTIAZEM HYDROCHLORIDE 240 MG/1
240 CAPSULE, COATED, EXTENDED RELEASE ORAL DAILY
Qty: 30 CAP | Refills: 1 | OUTPATIENT
Start: 2020-01-22

## 2020-01-22 NOTE — TELEPHONE ENCOUNTER
Please advise that we cannot refill medication until patient is actually seen, would recommend patient ask the pharmacist for an emergency supply of medication until day of appointment.

## 2020-01-22 NOTE — TELEPHONE ENCOUNTER
Pt is calling he has an apt set for next week but he is out of med and he needs a refill on his bp med he is going to get pharmacy to faxed med request to us can we do this

## 2020-01-23 NOTE — TELEPHONE ENCOUNTER
Called pt, verified name and . Informed pt that per Dr. Renteria Kinds we cannot refill medication until patient is actually seen, would recommend patient ask the pharmacist for an emergency supply of medication until day of appointment. Pt stated understanding.

## 2020-01-29 ENCOUNTER — OFFICE VISIT (OUTPATIENT)
Dept: FAMILY MEDICINE CLINIC | Age: 65
End: 2020-01-29

## 2020-01-29 VITALS
DIASTOLIC BLOOD PRESSURE: 73 MMHG | SYSTOLIC BLOOD PRESSURE: 169 MMHG | HEART RATE: 68 BPM | HEIGHT: 71 IN | OXYGEN SATURATION: 96 % | BODY MASS INDEX: 31.36 KG/M2 | WEIGHT: 224 LBS | RESPIRATION RATE: 17 BRPM | TEMPERATURE: 98.2 F

## 2020-01-29 DIAGNOSIS — I10 ESSENTIAL HYPERTENSION, BENIGN: ICD-10-CM

## 2020-01-29 DIAGNOSIS — E55.9 VITAMIN D DEFICIENCY: ICD-10-CM

## 2020-01-29 DIAGNOSIS — M79.673 PAIN OF FOOT, UNSPECIFIED LATERALITY: ICD-10-CM

## 2020-01-29 DIAGNOSIS — E11.65 UNCONTROLLED TYPE 2 DIABETES MELLITUS WITH HYPERGLYCEMIA (HCC): Primary | ICD-10-CM

## 2020-01-29 PROBLEM — G56.20 CUBITAL TUNNEL SYNDROME: Status: ACTIVE | Noted: 2019-03-27

## 2020-01-29 RX ORDER — LOSARTAN POTASSIUM AND HYDROCHLOROTHIAZIDE 12.5; 1 MG/1; MG/1
1 TABLET ORAL DAILY
Qty: 90 TAB | Refills: 3 | Status: SHIPPED | OUTPATIENT
Start: 2020-01-29 | End: 2021-04-26

## 2020-01-29 RX ORDER — DEXTROMETHORPHAN HYDROBROMIDE, GUAIFENESIN 5; 100 MG/5ML; MG/5ML
650 LIQUID ORAL EVERY 8 HOURS
COMMUNITY

## 2020-01-29 RX ORDER — METFORMIN HYDROCHLORIDE 500 MG/1
500 TABLET, FILM COATED, EXTENDED RELEASE ORAL
COMMUNITY
Start: 2018-03-28 | End: 2020-01-29 | Stop reason: SDUPTHER

## 2020-01-29 RX ORDER — UREA 10 %
5000 LOTION (ML) TOPICAL
COMMUNITY
End: 2020-05-12

## 2020-01-29 RX ORDER — MELOXICAM 15 MG/1
TABLET ORAL
COMMUNITY
Start: 2020-01-03 | End: 2020-02-10

## 2020-01-29 RX ORDER — ATORVASTATIN CALCIUM 10 MG/1
TABLET, FILM COATED ORAL
COMMUNITY
Start: 2020-01-03 | End: 2020-01-29 | Stop reason: SDUPTHER

## 2020-01-29 RX ORDER — ATORVASTATIN CALCIUM 10 MG/1
10 TABLET, FILM COATED ORAL DAILY
Qty: 90 TAB | Refills: 3 | Status: SHIPPED | OUTPATIENT
Start: 2020-01-29 | End: 2021-02-11 | Stop reason: SDUPTHER

## 2020-01-29 NOTE — PATIENT INSTRUCTIONS
Body Mass Index: Care Instructions Your Care Instructions Body mass index (BMI) can help you see if your weight is raising your risk for health problems. It uses a formula to compare how much you weigh with how tall you are. · A BMI lower than 18.5 is considered underweight. · A BMI between 18.5 and 24.9 is considered healthy. · A BMI between 25 and 29.9 is considered overweight. A BMI of 30 or higher is considered obese. If your BMI is in the normal range, it means that you have a lower risk for weight-related health problems. If your BMI is in the overweight or obese range, you may be at increased risk for weight-related health problems, such as high blood pressure, heart disease, stroke, arthritis or joint pain, and diabetes. If your BMI is in the underweight range, you may be at increased risk for health problems such as fatigue, lower protection (immunity) against illness, muscle loss, bone loss, hair loss, and hormone problems. BMI is just one measure of your risk for weight-related health problems. You may be at higher risk for health problems if you are not active, you eat an unhealthy diet, or you drink too much alcohol or use tobacco products. Follow-up care is a key part of your treatment and safety. Be sure to make and go to all appointments, and call your doctor if you are having problems. It's also a good idea to know your test results and keep a list of the medicines you take. How can you care for yourself at home? · Practice healthy eating habits. This includes eating plenty of fruits, vegetables, whole grains, lean protein, and low-fat dairy. · If your doctor recommends it, get more exercise. Walking is a good choice. Bit by bit, increase the amount you walk every day. Try for at least 30 minutes on most days of the week. · Do not smoke. Smoking can increase your risk for health problems.  If you need help quitting, talk to your doctor about stop-smoking programs and medicines. These can increase your chances of quitting for good. · Limit alcohol to 2 drinks a day for men and 1 drink a day for women. Too much alcohol can cause health problems. If you have a BMI higher than 25 · Your doctor may do other tests to check your risk for weight-related health problems. This may include measuring the distance around your waist. A waist measurement of more than 40 inches in men or 35 inches in women can increase the risk of weight-related health problems. · Talk with your doctor about steps you can take to stay healthy or improve your health. You may need to make lifestyle changes to lose weight and stay healthy, such as changing your diet and getting regular exercise. If you have a BMI lower than 18.5 · Your doctor may do other tests to check your risk for health problems. · Talk with your doctor about steps you can take to stay healthy or improve your health. You may need to make lifestyle changes to gain or maintain weight and stay healthy, such as getting more healthy foods in your diet and doing exercises to build muscle. Where can you learn more? Go to http://fariba-kimberley.info/. Enter S176 in the search box to learn more about \"Body Mass Index: Care Instructions. \" Current as of: October 13, 2016 Content Version: 11.4 © 4844-2793 [employer]. Care instructions adapted under license by [employer] (which disclaims liability or warranty for this information). If you have questions about a medical condition or this instruction, always ask your healthcare professional. [address] any warranty or liability for your use of this information.

## 2020-01-29 NOTE — PROGRESS NOTES
Chief Complaint   Patient presents with    Establish Care     Pt is here today to transfer care from the [facility] practice. Pt just bought a home BP cuff. Last HgA1c was 9.4 on 10/23/18 per visible records. Pt is now seeing an endocrinologist, has had an A1c since then, reports that it was still high. Pt reports that he sees Dr. Mohini Wells. Pt reports that the back problems her was seeing Dr. Miriam Pittman for resolved, but have not returned on the other side. Pt has been taking meloxicam as needed. Subjective: (As above and below)     Chief Complaint   Patient presents with   [facility]     he is a 75y.o. year old male who presents for evaluation. Reviewed PmHx, RxHx, FmHx, SocHx, AllgHx and updated in chart. Review of Systems - negative except as listed above    Objective:     Vitals:    01/29/20 0955   BP: 169/73   Pulse: 68   Resp: 17   Temp: 98.2 °F (36.8 °C)   TempSrc: Oral   SpO2: 96%   Weight: 224 lb (101.6 kg)   Height: 5' 11\" (1.803 m)     Physical Examination: General appearance - alert, well appearing, and in no distress  Mental status - normal mood, behavior, speech, dress, motor activity, and thought processes  Mouth - mucous membranes moist, pharynx normal without lesions  Chest - clear to auscultation, no wheezes, rales or rhonchi, symmetric air entry  Heart - normal rate, regular rhythm, normal S1, S2, no murmurs, rubs, clicks or gallops  Musculoskeletal - pain in right foot, lower arch than on left  Extremities - peripheral pulses normal, no pedal edema, no clubbing or cyanosis    Assessment/ Plan:   1. Uncontrolled type 2 diabetes mellitus with hyperglycemia ([address]  -continue on current medications  -follow up with endocrinology as scheduled   - atorvastatin (LIPITOR) 10 mg tablet; Take 1 Tab by mouth daily. Dispense: 90 Tab; Refill: 3    2.  Essential hypertension, benign  -elevated today, take medication as written, check Bp at home  - losartan-hydroCHLOROthiazide (HYZAAR) 100-12.5 mg per tablet; Take 1 Tab by mouth daily. Dispense: 90 Tab; Refill: 3    3. Vitamin D deficiency  -continue on supplement    4. Pain of foot, unspecified laterality  -refer to Dr. Coco Calvin for reassessment   - REFERRAL TO ORTHOPEDICS       I have discussed the diagnosis with the patient and the intended plan as seen in the above orders. The patient has received an after-visit summary and questions were answered concerning future plans. Medication Side Effects and Warnings were discussed with patient: yes  Patient Labs were reviewed: yes  Patient Past Records were reviewed:  yes    Terrel Felty, M.D. Discussed the patient's BMI with him. The BMI follow up plan is as follows:     dietary management education, guidance, and counseling  encourage exercise  monitor weight  prescribed dietary intake    An After Visit Summary was printed and given to the patient.

## 2020-01-29 NOTE — PROGRESS NOTES
Chief Complaint   Patient presents with   [facility] Maintenance reviewed     1. Have you been to the ER, urgent care clinic since your last visit? Hospitalized since your last visit? No    2. Have you seen or consulted any other health care providers outside of the [address] [place] [address] since your last visit? Include any pap smears or colon screening.  No

## 2020-02-07 LAB
ALBUMIN SERPL-MCNC: 4.5 G/DL (ref 3.8–4.8)
ALBUMIN/GLOB SERPL: 1.6 {RATIO} (ref 1.2–2.2)
ALP SERPL-CCNC: 88 IU/L (ref 39–117)
ALT SERPL-CCNC: 14 IU/L (ref 0–44)
AST SERPL-CCNC: 13 IU/L (ref 0–40)
BILIRUB SERPL-MCNC: <0.2 MG/DL (ref 0–1.2)
BUN SERPL-MCNC: 12 MG/DL (ref 8–27)
BUN/CREAT SERPL: 13 (ref 10–24)
CALCIUM SERPL-MCNC: 9.4 MG/DL (ref 8.6–10.2)
CHLORIDE SERPL-SCNC: 101 MMOL/L (ref 96–106)
CHOLEST SERPL-MCNC: 118 MG/DL (ref 100–199)
CO2 SERPL-SCNC: 28 MMOL/L (ref 20–29)
CREAT SERPL-MCNC: 0.93 MG/DL (ref 0.76–1.27)
EST. AVERAGE GLUCOSE BLD GHB EST-MCNC: 269 MG/DL
GLOBULIN SER CALC-MCNC: 2.8 G/DL (ref 1.5–4.5)
GLUCOSE SERPL-MCNC: 183 MG/DL (ref 65–99)
HBA1C MFR BLD: 11 % (ref 4.8–5.6)
HDLC SERPL-MCNC: 42 MG/DL
INTERPRETATION, 910389: NORMAL
LDLC SERPL CALC-MCNC: 59 MG/DL (ref 0–99)
Lab: NORMAL
POTASSIUM SERPL-SCNC: 5 MMOL/L (ref 3.5–5.2)
PROT SERPL-MCNC: 7.3 G/DL (ref 6–8.5)
SODIUM SERPL-SCNC: 141 MMOL/L (ref 134–144)
TRIGL SERPL-MCNC: 87 MG/DL (ref 0–149)
VLDLC SERPL CALC-MCNC: 17 MG/DL (ref 5–40)

## 2020-02-10 ENCOUNTER — OFFICE VISIT (OUTPATIENT)
Dept: ENDOCRINOLOGY | Age: 65
End: 2020-02-10

## 2020-02-10 VITALS
HEIGHT: 71 IN | WEIGHT: 223.2 LBS | HEART RATE: 78 BPM | DIASTOLIC BLOOD PRESSURE: 80 MMHG | SYSTOLIC BLOOD PRESSURE: 167 MMHG | BODY MASS INDEX: 31.25 KG/M2

## 2020-02-10 DIAGNOSIS — E55.9 VITAMIN D DEFICIENCY: ICD-10-CM

## 2020-02-10 DIAGNOSIS — E78.5 HYPERLIPIDEMIA LDL GOAL <100: ICD-10-CM

## 2020-02-10 DIAGNOSIS — I10 ESSENTIAL HYPERTENSION, BENIGN: ICD-10-CM

## 2020-02-10 RX ORDER — DILTIAZEM HYDROCHLORIDE 360 MG/1
360 CAPSULE, EXTENDED RELEASE ORAL DAILY
Qty: 30 CAP | Refills: 11 | Status: SHIPPED | OUTPATIENT
Start: 2020-02-10 | End: 2021-02-24 | Stop reason: SDUPTHER

## 2020-02-10 RX ORDER — MELATONIN
DAILY
COMMUNITY

## 2020-02-10 NOTE — PROGRESS NOTES
Chief Complaint   Patient presents with    Diabetes     History of Present Illness: Patience Westbrook is a 75 y.o. male here for follow up of diabetes. Weight up 7 lbs since last visit in 7/19. Dr. Qiu Signs retired and he has now established with Dr. Bia Rendon and first visit was 2 weeks ago. He had been started on atorvastatin by Dr. Jose Guthrie this summer and has remained on this. Has cut back on ice cream and sodas over the past few months but still has to work on carb intake. He has been following the scale I gave him in July but is only checking in the morning and has been taking a lot of doses of 40 units as his fasting sugars are between 150-200. Just takes 35 units at night when he doesn't check before dinner. He would like to have foot surgery on his right foot with Dr. Bel Barreto in the future to help his arches but I advised him to hold on this until his A1c is under 9%. Compliant with BP regimen but home readings are above 140/90 too. Current Outpatient Medications   Medication Sig    cholecalciferol (VITAMIN D3) (1000 Units /25 mcg) tablet Take  by mouth daily.  MEN'S MULTI-VITAMIN PO Take  by mouth.  acetaminophen (TYLENOL ARTHRITIS PAIN) 650 mg TbER Take 650 mg by mouth every eight (8) hours.  cyanocobalamin (VITAMIN B12) 100 mcg tablet Take 5,000 mcg by mouth.  losartan-hydroCHLOROthiazide (HYZAAR) 100-12.5 mg per tablet Take 1 Tab by mouth daily.  atorvastatin (LIPITOR) 10 mg tablet Take 1 Tab by mouth daily.  insulin NPH/insulin regular (NOVOLIN 70/30 U-100 INSULIN) 100 unit/mL (70-30) injection Inject 35 units before breakfast and dinner + 5 units for every 50 mg/dl above 150 mg/dl--dispense relion brand    metFORMIN ER (GLUCOPHAGE XR) 500 mg tablet TAKE FOUR TABLETS BY MOUTH ONCE DAILY    BLOOD-GLUCOSE METER (RELION PRIME METER) by Does Not Apply route.  CARTIA  mg ER capsule Take 240 mg by mouth daily.     aspirin 81 mg chewable tablet Take 81 mg by mouth daily. No current facility-administered medications for this visit. Allergies   Allergen Reactions    Jardiance [Empagliflozin] Other (comments)     Yeast infection     Review of Systems:  - Eyes: no blurry vision or double vision  - Cardiovascular: no chest pain  - Respiratory: no shortness of breath  - Musculoskeletal: no myalgias  - Neurological: no numbness/tingling in extremities    Physical Examination:  Blood pressure 170/89, pulse 78, height 5' 11\" (1.803 m), weight 223 lb 3.2 oz (101.2 kg). Repeat manually 167/80 in left arm.  - General: pleasant, no distress, good eye contact   - Neck: no carotid bruits  - Cardiovascular: regular, normal rate, nl s1 and s2, no m/r/g,   - Respiratory: clear bilaterally  - Integumentary: no edema,   - Psychiatric: normal mood and affect    Diabetic foot exam:     Left Foot:   Visual Exam: callous - mild   Pulse DP: 2+ (normal)   Filament test: reduced sensation    Vibratory sensation: diminished      Right Foot:   Visual Exam: callous - mild with no arch   Pulse DP: 2+ (normal)   Filament test: reduced sensation    Vibratory sensation: diminished        Data Reviewed:   Component      Latest Ref Rng & Units 2/6/2020 2/6/2020 2/6/2020           8:41 AM  8:41 AM  8:41 AM   Glucose      65 - 99 mg/dL 183 (H)     BUN      8 - 27 mg/dL 12     Creatinine      0.76 - 1.27 mg/dL 0.93     GFR est non-AA      >59 mL/min/1.73 85     GFR est AA      >59 mL/min/1.73 99     BUN/Creatinine ratio      10 - 24 13     Sodium      134 - 144 mmol/L 141     Potassium      3.5 - 5.2 mmol/L 5.0     Chloride      96 - 106 mmol/L 101     CO2      20 - 29 mmol/L 28     Calcium      8.6 - 10.2 mg/dL 9.4     Protein, total      6.0 - 8.5 g/dL 7.3     Albumin      3.8 - 4.8 g/dL 4.5     GLOBULIN, TOTAL      1.5 - 4.5 g/dL 2.8     A-G Ratio      1.2 - 2.2 1.6     Bilirubin, total      0.0 - 1.2 mg/dL <0.2     Alk.  phosphatase      39 - 117 IU/L 88     AST      0 - 40 IU/L 13     ALT (SGPT) 0 - 44 IU/L 14     Cholesterol, total      100 - 199 mg/dL  118    Triglyceride      0 - 149 mg/dL  87    HDL Cholesterol      >39 mg/dL  42    VLDL, calculated      5 - 40 mg/dL  17    LDL, calculated      0 - 99 mg/dL  59    Hemoglobin A1c, (calculated)      4.8 - 5.6 %   11.0 (H)   Estimated average glucose      mg/dL   269     Assessment/Plan:     1. Type II or unspecified type diabetes mellitus without mention of complication, uncontrolled ([facility] [address]): his most recent Hgb A1c was 11% in 2/20 down from 12% in 6/19 up from 9.4% in 10/18 up from 9.2% in 2/18 down from 9.9% in 9/17 up from 9.4% in 3/17 down from 10.6% in 10/16 up from 9.9% in 4/16 down from 11.4% in 1/16 up from 10.2% in 9/15 up from 10% in 1/15. A1c is slightly better but not checking 2 times a day so will start doing this and my nurse navigator, Nati Maria, will reach out in a week to see his progress so we can determine where to make changes. - cont70/30 insulin 35 units before breakfast and dinner + 5 units for every 50 mg/dl above 150 mg/dl  - cont metformin  mg tabs 2 tabs bid  - check bs twice daily   - foot exam done 2/20  - optho UTD 11/19  - microalbumin nl 6/19  - check A1c and bmp prior to next visit      2. Unspecified essential hypertension: his BP was above goal < 140/90 so increased diltiazem  - increase diltiazem to 360 mg daily  - cont losartan/hct 100/12.5 mg daily  - monitor home blood pressure readings      3. Unspecified vitamin D deficiency: Level was 28.1 in 9/15 and with taking 1000 units daily up to 37 in 1/16. Down to 29 in 10/16 so increased to 2000 units daily and up to 41 in 3/17 and in 2/18. Down to 33.9 in 6/19  - cont vitamin D 2000 units daily  - check Vitamin D 25-OH level prior to next visit      4. Hyperlipidemia: Given DM, Goal LDL < 100, non-HDL < 130, and TG < 150. LDL 79 in 1/15 and 68 in 9/15 and 70 in 4/16 and 94 in 3/17 not on meds.   Up to 100 in 2/18 and 103 in 10/18 and started on lipitor 10 mg in summer 2019 and down to 59 in 2/20.  - cont lipitor 10 mg daily  - check lipids in fall 2020    Patient Instructions   1) Your A1c is slightly better at 11% down from 12% but I don't have enough data to know where to make changes. 2) Write down your sugars before breakfast AND dinner and how much insulin you took and my nurse navigator, Shirin Rae, will call you in one week to obtain your blood sugars so we can start adjusting your doses of insulin. 3) Your liver and kidney and cholesterol are normal.    4) Increase the diltiazem to 360 mg daily. This will be ready for  at the pharmacy today. 5) Start monitoring blood pressure about 2-3 times per week at alternating times either in the morning or evening after resting for 5 minutes and sitting upright in a chair with your arm at heart level. Please let me know if you are having readings over 140 on the top number or 90 on the bottom number after 2 week, then let me know    6) I would hold on any foot surgery for the next 3 months to see if we can get your A1c back down closer to 9% or less. Follow-up and Dispositions    · Return in about 3 months (around 5/10/2020).                Copy sent to:  Greg Waggoner MD as PCP - General (Family Practice)  Leny Jiang MD (Orthopedic Surgery)

## 2020-02-10 NOTE — PATIENT INSTRUCTIONS
1) Your A1c is slightly better at 11% down from 12% but I don't have enough data to know where to make changes. 2) Write down your sugars before breakfast AND dinner and how much insulin you took and my nurse navigator, Aurelio Chairez, will call you in one week to obtain your blood sugars so we can start adjusting your doses of insulin. 3) Your liver and kidney and cholesterol are normal. 
 
4) Increase the diltiazem to 360 mg daily. This will be ready for  at the pharmacy today. 5) Start monitoring blood pressure about 2-3 times per week at alternating times either in the morning or evening after resting for 5 minutes and sitting upright in a chair with your arm at heart level. Please let me know if you are having readings over 140 on the top number or 90 on the bottom number after 2 week, then let me know 6) I would hold on any foot surgery for the next 3 months to see if we can get your A1c back down closer to 9% or less.

## 2020-02-11 ENCOUNTER — TELEPHONE (OUTPATIENT)
Dept: ENDOCRINOLOGY | Age: 65
End: 2020-02-11

## 2020-02-11 NOTE — TELEPHONE ENCOUNTER
Called and spoke with the pharmacist, Laura Panda, who just needed clarification that whatever 360 mg long acting diltiazem is covered could be dispensed and I told her this is fine and she will process this and notify the patient.
Called x 2 line busy.
Pharmacy called needing clarification n [place]. Please call back because the patient is waiting at the pharmacy.  Thanks
Daily Assessment

## 2020-02-17 ENCOUNTER — PATIENT OUTREACH (OUTPATIENT)
Dept: ENDOCRINOLOGY | Age: 65
End: 2020-02-17

## 2020-02-17 NOTE — PROGRESS NOTES
Ambulatory Care Management Note    Date/Time:  2/17/2020 3:53 PM    This patient was received as a referral from Provider   Ambulatory Care Manager outreached to patient today to offer care management services. Introduction to self and role of care manager provided. Patient accepted care management services at this time. Follow up call scheduled at this time. Patient has Ambulatory Care Manager's contact number for for any questions or concerns.

## 2020-02-17 NOTE — LETTER
2/17/2020 3:38 PM 
 
Mr. Vitor Childers [address] [place] [id number] Dear Mr. Rai Marcos: 
 
I am a RN Ambulatory Care Manager working with Usman Hardwick. Part of my job is to follow up with patients who have a chronic disease. I check to see how you are feeling, answer any questions you may have about your health and check to see if you have a follow-up appointment to see your primary care physician. If you have changed your Primary Care Provider, let us know so we can update our records. Otherwise, I am available to help provide education and resources for your needs. I can be reached directly Monday thru Friday at [phone] or [phone]. Thank you for allowing me to participate in your care! Sincerely, Nicole Grove, RN Enclosure(s) [employer]estyle [employer] Diabetic-friendly meals Let's Count Carbs

## 2020-02-19 ENCOUNTER — PATIENT OUTREACH (OUTPATIENT)
Dept: ENDOCRINOLOGY | Age: 65
End: 2020-02-19

## 2020-02-19 NOTE — PROGRESS NOTES
Ambulatory Care Management Note    Date/Time:  2/19/2020 9:43 AM    This Ambulatory Care Manager (ACM) reviewed and updated the following screenings during this call; disease specific assessment. Patient's challenges to self management identified:   financial, ineffective coping, lack of knowledge about disease, level of motivation, polypharmacy      Medication Management:  good adherence, poor understanding    Advance Care Planning:   Does patient have an Advance Directive:  reviewed and current    [employer], Referrals, and Durable Medical Equipment: n/a      Health Maintenance Due   Topic Date Due    Hepatitis C Screening  1955    Medicare Yearly Exam  10/29/2018    Pneumococcal 65+ years (2 of 2 - PPSV23) 12/19/2019     Health Maintenance reviewed - glycohemoglobin ordered    Patient was asked to consider health care goals that they would like to focus on with this ACM. ACM will follow up with patient to discuss goals and establish care plan in the next 7-14 days.      PCP/Specialist follow up:   Future Appointments   Date Time Provider Mor [place]ciaga   5/12/2020 12:10 PM Will Gresham MD RDE [facility]   7/29/2020  1:00 PM Keeley Frias MD [address] [facility]

## 2020-02-26 ENCOUNTER — PATIENT OUTREACH (OUTPATIENT)
Dept: ENDOCRINOLOGY | Age: 65
End: 2020-02-26

## 2020-02-26 NOTE — PROGRESS NOTES
Goals Addressed                 This Visit's Progress     Skills necessary to properly manage their diabetes, including use of devices and symptom monitoring tools. 2/19/20  Patient in office today for insertion of Freestyle Emerson for blood sugar monitoring. Patient will learn to insert Freestyle Emerson sensor and use reader to test blood sugar for the next 14 days. ACM met with patient in office today. Patient performed return demonstration with insertion of Freestyle Emerson sensor. ACM assisted patient with Freestyle emerson reader set up. Patient will scan his blood sugar 3-4 times daily. Patient will remove Freestyle emerson after 14 days and review blood sugars with ACM in 1 week. 2/26/20  Called patient today, no answer, left voicemail message to return telephone call. 2/27/20  Spoke briefly to patient today. Patient states he is currently away from home but has his reader with him. Patient states his blood sugar this morning was 72, but he is unable to provide ACM with the other blood sugar readings at this time. ACM will call patient tomorrow for blood sugar readings.

## 2020-02-28 ENCOUNTER — PATIENT OUTREACH (OUTPATIENT)
Dept: ENDOCRINOLOGY | Age: 65
End: 2020-02-28

## 2020-02-28 NOTE — PROGRESS NOTES
Goals Addressed                 This Visit's Progress     Skills necessary to properly manage their diabetes, including use of devices and symptom monitoring tools. 2/19/20  Patient in office today for insertion of Freestyle Emerson for blood sugar monitoring. Patient will learn to insert Freestyle Emerson sensor and use reader to test blood sugar for the next 14 days. ACM met with patient in office today. Patient performed return demonstration with insertion of Freestyle Emerson sensor. ACM assisted patient with Freestyle emerson reader set up. Patient will scan his blood sugar 3-4 times daily. Patient will remove Freestyle emerson after 14 days and review blood sugars with ACM in 1 week. 2/26/20  Called patient today, no answer, left voicemail message to return telephone call. 2/27/20  Spoke briefly to patient today. Patient states he is currently away from home but has his reader with him. Patient states his blood sugar this morning was 72, but he is unable to provide ACM with the other blood sugar readings at this time. ACM will call patient tomorrow for blood sugar readings. 2/28/20  Patient called today with the following blood sugar readings from his [facility]. Patient met with ACM on 2/19/20 for insertion of Freestyle Emerson. Patient states he is taking his insulin Novolin 70/30, 35 units with breakfast, dinner and using his corrective scale for high blood sugars. Patient states no episodes of low blood sugar noted.     2/19/20  82 at dinner    2/20/20  160 at breakfast  230 at lunch  160 at dinner   209 at bedtime    2/21/20  141 at breakfast  194 at lunch  206 at dinner    2/22/20  124 at breakfast  176 at lunch  191 at dinner    2/23/20  131 at breakfast  110 at lunch  243 at dinner    2/24/20  140 at breakfast  230 at lunch  294 at dinner    2/25/20  176 at breakfast  280 at lunch  112 at dinner    2/26/20  112 at breakfast  186 at lunch  196 at dinner    2/27/20  175 at breakfast  220 at lunch  250 at dinner    2/28/20  87 at breakfast  12 at lunch    Patient will monitor his dietary sugar and starches. Patient will continue with Freestyle Emerson and remove on 3/4/20. Patient will discuss blood sugar readings with ACM in next 2 weeks.

## 2020-02-29 ENCOUNTER — TELEPHONE (OUTPATIENT)
Dept: ENDOCRINOLOGY | Age: 65
End: 2020-02-29

## 2020-02-29 NOTE — TELEPHONE ENCOUNTER
Please call him and let him know I received his blood sugar readings from [place]. It appears his blood sugars are rising frequently between breakfast and lunch so his current scale is not aggressive enough for breakfast.  Please have him take 5 more units for his breakfast dose from what is listed on his current scale (instead of  being 35 units, it should be 40 units and up by 5 units for every 50 points from there). He should stay on the current scale for dinner. Thanks.

## 2020-03-25 ENCOUNTER — PATIENT OUTREACH (OUTPATIENT)
Dept: ENDOCRINOLOGY | Age: 65
End: 2020-03-25

## 2020-03-25 NOTE — PROGRESS NOTES
Goals Addressed                 This Visit's Progress     Skills necessary to properly manage their diabetes, including use of devices and symptom monitoring tools. 2/19/20  Patient in office today for insertion of Freestyle Emerson for blood sugar monitoring. Patient will learn to insert Freestyle Emerson sensor and use reader to test blood sugar for the next 14 days. ACM met with patient in office today. Patient performed return demonstration with insertion of Freestyle Emerson sensor. ACM assisted patient with Freestyle emerson reader set up. Patient will scan his blood sugar 3-4 times daily. Patient will remove Freestyle emerson after 14 days and review blood sugars with ACM in 1 week. 2/26/20  Called patient today, no answer, left voicemail message to return telephone call. 2/27/20  Spoke briefly to patient today. Patient states he is currently away from home but has his reader with him. Patient states his blood sugar this morning was 72, but he is unable to provide ACM with the other blood sugar readings at this time. ACM will call patient tomorrow for blood sugar readings. 2/28/20  Patient called today with the following blood sugar readings from his [facility]. Patient met with ACM on 2/19/20 for insertion of Freestyle Emerson. Patient states he is taking his insulin Novolin 70/30, 35 units with breakfast, dinner and using his corrective scale for high blood sugars. Patient states no episodes of low blood sugar noted.     2/19/20  82 at dinner    2/20/20  160 at breakfast  230 at lunch  160 at dinner   209 at bedtime    2/21/20  141 at breakfast  194 at lunch  206 at dinner    2/22/20  124 at breakfast  176 at lunch  191 at dinner    2/23/20  131 at breakfast  110 at lunch  243 at dinner    2/24/20  140 at breakfast  230 at lunch  294 at dinner    2/25/20  176 at breakfast  280 at lunch  112 at dinner    2/26/20  112 at breakfast  186 at lunch  196 at dinner    2/27/20  175 at breakfast  220 at lunch  250 at dinner    2/28/20  87 at breakfast  12 at lunch    Patient will monitor his dietary sugar and starches. Patient will continue with Freestyle Emerson and remove on 3/4/20. Patient will discuss blood sugar readings with ACM in next 2 weeks. 3/25/20  Called patient today, no answer, left voicemail message to return telephone call.

## 2020-05-06 RX ORDER — METFORMIN HYDROCHLORIDE 500 MG/1
TABLET, EXTENDED RELEASE ORAL
Qty: 360 TAB | Refills: 3 | Status: SHIPPED | OUTPATIENT
Start: 2020-05-06 | End: 2021-05-09

## 2020-05-12 ENCOUNTER — VIRTUAL VISIT (OUTPATIENT)
Dept: ENDOCRINOLOGY | Age: 65
End: 2020-05-12

## 2020-05-12 DIAGNOSIS — I10 ESSENTIAL HYPERTENSION, BENIGN: ICD-10-CM

## 2020-05-12 DIAGNOSIS — E11.65 UNCONTROLLED TYPE 2 DIABETES MELLITUS WITH HYPERGLYCEMIA (HCC): Primary | ICD-10-CM

## 2020-05-12 DIAGNOSIS — E55.9 VITAMIN D DEFICIENCY: ICD-10-CM

## 2020-05-12 DIAGNOSIS — E78.5 HYPERLIPIDEMIA LDL GOAL <100: ICD-10-CM

## 2020-05-12 RX ORDER — ACETAMINOPHEN, DIPHENHYDRAMINE HCL, PHENYLEPHRINE HCL 325; 25; 5 MG/1; MG/1; MG/1
TABLET ORAL
COMMUNITY
End: 2020-05-12

## 2020-05-12 RX ORDER — HYDROCHLOROTHIAZIDE 12.5 MG/1
12.5 TABLET ORAL DAILY
Qty: 90 TAB | Refills: 3 | Status: SHIPPED | OUTPATIENT
Start: 2020-05-12 | End: 2021-05-10

## 2020-05-12 NOTE — PROGRESS NOTES
Chief Complaint   Patient presents with    Diabetes     pcp and pharmacy confirmed       **THIS IS A VIRTUAL VISIT VIA A TELEPHONE ENCOUNTER. PATIENT AGREED TO HAVE THEIR CARE DELIVERED OVER THE PHONE IN PLACE OF THEIR REGULARLY SCHEDULED OFFICE VISIT**    History of Present Illness: Kassandra Thomas is a 75 y.o. male here for follow up of diabetes. Has been following the scale I gave him after his dose was changed on 2/29/20. Fasting sugars are usually under 130 but not checking regularly before dinner but when he has checked has seen in the 170s. Did wear the 14 day [address] after meeting with my nurse navigator, Britney Montilla, back in 2/20 and found this helped better track his food. Unfortunately this is too costly so he can't afford this at this time time and doesn't meet Medicare requirements to get this. Did have a few lows when he was wearing this. Compliant with BP regimen. Home BP readings are sometimes in the 120-130s but other times in the 140-150s over 70s back in February and March. Has seen more in the 150-160s the past month. Compliant with lipitor. Current Outpatient Medications   Medication Sig    cyanocobalamin, vitamin B-12, (VITAMIN B-12 PO) Take 5,000 mcg by mouth daily.  metFORMIN ER (GLUCOPHAGE XR) 500 mg tablet TAKE FOUR TABLETS BY MOUTH ONCE DAILY    insulin NPH/insulin regular (NOVOLIN 70/30 U-100 INSULIN) 100 unit/mL (70-30) injection Inject 40 units before breakfast and 35 units before dinner + 5 units for every 50 mg/dl above 150 mg/dl--dispense relion brand--Dose change 2/29/20--updated med list--did not send prescription to the pharmacy    cholecalciferol (VITAMIN D3) (1000 Units /25 mcg) tablet Take  by mouth daily.  dilTIAZem (TIAZAC) 360 mg ER capsule Take 1 Cap by mouth daily. Replaces the 240 mg dose    MEN'S MULTI-VITAMIN PO Take  by mouth.  acetaminophen (TYLENOL ARTHRITIS PAIN) 650 mg TbER Take 650 mg by mouth every eight (8) hours.     losartan-hydroCHLOROthiazide (HYZAAR) 100-12.5 mg per tablet Take 1 Tab by mouth daily.  atorvastatin (LIPITOR) 10 mg tablet Take 1 Tab by mouth daily.  aspirin 81 mg chewable tablet Take 81 mg by mouth daily. No current facility-administered medications for this visit. Allergies   Allergen Reactions    Jardiance [Empagliflozin] Other (comments)     Yeast infection     Review of Systems: PER HPI    Physical Examination: NOT PERFORMED DUE TO THIS BEING A TELEPHONE CALL      Data Reviewed:   - none new for review    Assessment/Plan:     1. Type II or unspecified type diabetes mellitus without mention of complication, uncontrolled ([facility] [address]): his most recent Hgb A1c was 11% in 2/20 down from 12% in 6/19 up from 9.4% in 10/18 up from 9.2% in 2/18 down from 9.9% in 9/17 up from 9.4% in 3/17 down from 10.6% in 10/16 up from 9.9% in 4/16 down from 11.4% in 1/16 up from 10.2% in 9/15 up from 10% in 1/15. Still not checking 2 times daily so it will be difficult to gain better control until he does this. - cont 70/30 insulin 40 units before breakfast and dinner + 5 units for every 50 mg/dl above 150 mg/dl  - cont metformin  mg tabs 2 tabs bid  - check bs twice daily   - foot exam done 2/20  - optho UTD 11/19  - microalbumin nl 6/19  - check A1c and bmp now  - check Hgb A1c, cmp, and microalbumin prior to next visit      2. Unspecified essential hypertension: his BP was above goal < 140/90 so increased hctz  - cont diltiazem 360 mg daily  - cont losartan/hct 100/12.5 mg daily  - begin hctz 12.5 mg daily  - monitor home blood pressure readings      3. Unspecified vitamin D deficiency: Level was 28.1 in 9/15 and with taking 1000 units daily up to 37 in 1/16. Down to 29 in 10/16 so increased to 2000 units daily and up to 41 in 3/17 and in 2/18. Down to 33.9 in 6/19  - cont vitamin D 2000 units daily  - check Vitamin D 25-OH level now      4.   Hyperlipidemia: Given DM, Goal LDL < 100, non-HDL < 130, and TG < 150. LDL 79 in 1/15 and 68 in 9/15 and 70 in 4/16 and 94 in 3/17 not on meds. Up to 100 in 2/18 and 103 in 10/18 and started on lipitor 10 mg in summer 2019 and down to 59 in 2/20.  - cont lipitor 10 mg daily  - check lipids prior to next visit    Patient Instructions   1) Please check before breakfast AND diner everyday and adjust your insulin based on the scale below:    Blood sugar  Breakfast   Dinner   Less than 90  Eat first, take 35  Eat first, take 30     40    35  151-200  45     40  201-250  50    45  251-300  55    50  301-350  60    55  351-400  65    60  401-450  70    65  451-500  75    70  Over 500  80    75    2) Start taking hydrochlorothiazide 12.5 mg once daily in the morning IN ADDITION TO the diltiazm 360 mg capsule once daily and losartan-hctz 100/12.5 mg once daily. 3) Start monitoring blood pressure about 2-3 times per week at alternating times either in the morning or evening after resting for 5 minutes and sitting upright in a chair with your arm at heart level. Please let me know if you are having readings over 140 on the top number or 90 on the bottom number after 2 weeks on this new regimen. If this is controlling your blood pressure, then we can order you losartan-hctz 100/25 mg tabs to take 1 tab daily in the future so you won't need to take the hctz by itself. 4) Take the enclosed lab slip to repeat your labs prior to the next visit. 5) Please come for a follow up visit on 11/17/20 at 1:30pm in our [place] office. Follow-up and Dispositions    · Return for 11/17/20 at 1:30pm.             We spent 16 minutes of total time together during this virtual visit with a telephone encounter. All questions were answered and a copy of the instructions were sent to him via a letter.     Copy sent to:  Radha Waggoner MD    Lab follow up: 6/18/20    Sent him the following message in a letter:    Resulted Orders   HEMOGLOBIN A1C WITH EAG (Collected: 6/17/2020 11:54 AM)   Result Value Ref Range    Hemoglobin A1c 10.2 (H) 4.8 - 5.6 %      Comment:               Prediabetes: 5.7 - 6.4           Diabetes: >6.4           Glycemic control for adults with diabetes: <7.0      Estimated average glucose 246 mg/dL    Narrative    Performed at:  [address]  [id number]  : Soren Schwartz MD, Phone:  [phone]   METABOLIC PANEL, BASIC (Collected: 6/17/2020 11:54 AM)   Result Value Ref Range    Glucose 146 (H) 65 - 99 mg/dL    BUN 13 8 - 27 mg/dL    Creatinine 0.97 0.76 - 1.27 mg/dL    GFR est non-AA 81 >59 mL/min/1.73    GFR est AA 94 >59 mL/min/1.73    BUN/Creatinine ratio 13 10 - 24    Sodium 140 134 - 144 mmol/L    Potassium 4.6 3.5 - 5.2 mmol/L    Chloride 100 96 - 106 mmol/L    CO2 26 20 - 29 mmol/L    Calcium 9.9 8.6 - 10.2 mg/dL    Narrative    Performed at:  [address]  [id number]  : Soren Schwartz MD, Phone:  [phone]   VITAMIN D, 25 HYDROXY (Collected: 6/17/2020 11:54 AM)   Result Value Ref Range    VITAMIN D, 25-HYDROXY 51.4 30.0 - 100.0 ng/mL      Comment:      Vitamin D deficiency has been defined by the [address] [facility] [address] practice guideline as a  level of serum 25-OH vitamin D less than 20 ng/mL (1,2). The Endocrine Society went on to further define vitamin D  insufficiency as a level between 21 and 29 ng/mL (2). 1. IOM ([place]). 2010. Dietary reference     intakes for calcium and D. [facility]: The     [employer]. 2. Zora MF, Titus NC, Jasmyne MATOS, et al.     Evaluation, treatment, and prevention of vitamin D     deficiency: an Endocrine Society clinical practice     guideline. JCEM. 2011 Jul; 96(7):1911-30.       Narrative    Performed at:  [address]  [id number]  : Soren Schwartz MD, Phone:  [phone]       1) Your Hemoglobin A1c (3 month test of blood sugar) is 10.2% which has improved slightly from 11% at last check. Please check before breakfast AND diner everyday and adjust your insulin based on the scale below:    Blood sugar  Breakfast   Dinner   Less than 90  Eat first, take 35  Eat first, take 30     40    35  151-200  45     40  201-250  50    45  251-300  55    50  301-350  60    55  351-400  65    60  401-450  70    65  451-500  75    70  Over 500  80    75    2) BUN and creatinine are markers of kidney function. Your values are normal.    3) Your vitamin D level is 51 which is normal.  Goal is over 30.   Please continue to take your current dose of vitamin D to keep your levels at goal.

## 2020-05-12 NOTE — PATIENT INSTRUCTIONS
1) Please check before breakfast AND diner everyday and adjust your insulin based on the scale below: 
 
Blood sugar  Breakfast   Dinner Less than 90  Eat first, take 35  Eat first, take 30 
   40    35 
151-200  45     40 
201-250  50    45 
251-300  55    50 
301-350  60    55 
351-400  65    60 
401-450  70    65 
451-500  75    70 Over 500  80    75 
 
2) Start taking hydrochlorothiazide 12.5 mg once daily in the morning IN ADDITION TO the diltiazm 360 mg capsule once daily and losartan-hctz 100/12.5 mg once daily. 3) Start monitoring blood pressure about 2-3 times per week at alternating times either in the morning or evening after resting for 5 minutes and sitting upright in a chair with your arm at heart level. Please let me know if you are having readings over 140 on the top number or 90 on the bottom number after 2 weeks on this new regimen. If this is controlling your blood pressure, then we can order you losartan-hctz 100/25 mg tabs to take 1 tab daily in the future so you won't need to take the hctz by itself. 4) Take the enclosed lab slip to repeat your labs prior to the next visit. 5) Please come for a follow up visit on 11/17/20 at 1:30pm in our [place] office.

## 2020-06-18 LAB
25(OH)D3+25(OH)D2 SERPL-MCNC: 51.4 NG/ML (ref 30–100)
BUN SERPL-MCNC: 13 MG/DL (ref 8–27)
BUN/CREAT SERPL: 13 (ref 10–24)
CALCIUM SERPL-MCNC: 9.9 MG/DL (ref 8.6–10.2)
CHLORIDE SERPL-SCNC: 100 MMOL/L (ref 96–106)
CO2 SERPL-SCNC: 26 MMOL/L (ref 20–29)
CREAT SERPL-MCNC: 0.97 MG/DL (ref 0.76–1.27)
EST. AVERAGE GLUCOSE BLD GHB EST-MCNC: 246 MG/DL
GLUCOSE SERPL-MCNC: 146 MG/DL (ref 65–99)
HBA1C MFR BLD: 10.2 % (ref 4.8–5.6)
POTASSIUM SERPL-SCNC: 4.6 MMOL/L (ref 3.5–5.2)
SODIUM SERPL-SCNC: 140 MMOL/L (ref 134–144)

## 2020-06-19 ENCOUNTER — PATIENT OUTREACH (OUTPATIENT)
Dept: ENDOCRINOLOGY | Age: 65
End: 2020-06-19

## 2020-06-19 NOTE — PROGRESS NOTES
His blood pressure is still above 140/90. Does he feel he has any room for improvement in his salt or caffeine intake so we can try to lower without additional meds? If not, then I will have to add amlodipine 5 mg daily. Let me know if he wants me to add this now or give it another 2 weeks. I will be mailing him a letter with his lab results. You are welcome to read to him what I wrote. Thanks.

## 2020-06-19 NOTE — PROGRESS NOTES
Goals Addressed                 This Visit's Progress     To decrease or maintain patient's biometrics:  HgA1c ?7 mg/dL, /80 or less. LDL of less than 100 and/or less than 70 in a patient with CAD.        6/19/20   Pt states the following blood pressureis readings since change in medication. Patient states he is taking Losartan/HCTZ 100/12.5 mg plus the additional HCT 12.5 daily. Pt's most recent blood pressure readings for review:    6/13/20  164/82    6/14/20  165/82    6/15/20  154/84    6/16/20  151/74    6/17/20  172/86    6/18/20  150/77    6/19/20  161/85    Patient states he had his blood drawn on 6/17/20. Patient's current HA1C is 10.2. Patient states he is watching his dietary sugar, starches and has increased his exercise. 6/20/20  Cruz Reyes MD  You 18 hours ago (2:03 PM)        His blood pressure is still above 140/90. Does he feel he has any room for improvement in his salt or caffeine intake so we can try to lower without additional meds? If not, then I will have to add amlodipine 5 mg daily. Let me know if he wants me to add this now or give it another 2 weeks. I will be mailing him a letter with his lab results. You are welcome to read to him what I wrote. Thanks. ACM  called patient, no answer, left voicemail message with above instructions.

## 2020-07-24 ENCOUNTER — TELEPHONE (OUTPATIENT)
Dept: FAMILY MEDICINE CLINIC | Age: 65
End: 2020-07-24

## 2020-07-24 NOTE — TELEPHONE ENCOUNTER
Patient had appt with Dr Eva Almanza on July 29 but cancelled because says his dm doctor has refilled everything and he is \"feeling fine\".  He said if he in fact does need to be seen, please call him at [phone] or [phone]

## 2020-10-15 ENCOUNTER — OFFICE VISIT (OUTPATIENT)
Dept: FAMILY MEDICINE CLINIC | Age: 65
End: 2020-10-15
Payer: MEDICARE

## 2020-10-15 VITALS
DIASTOLIC BLOOD PRESSURE: 89 MMHG | RESPIRATION RATE: 18 BRPM | SYSTOLIC BLOOD PRESSURE: 138 MMHG | HEART RATE: 82 BPM | TEMPERATURE: 97.6 F | OXYGEN SATURATION: 96 % | HEIGHT: 71 IN | WEIGHT: 223 LBS | BODY MASS INDEX: 31.22 KG/M2

## 2020-10-15 DIAGNOSIS — M54.42 CHRONIC MIDLINE LOW BACK PAIN WITH LEFT-SIDED SCIATICA: ICD-10-CM

## 2020-10-15 DIAGNOSIS — G89.29 CHRONIC MIDLINE LOW BACK PAIN WITH LEFT-SIDED SCIATICA: ICD-10-CM

## 2020-10-15 DIAGNOSIS — Z23 ENCOUNTER FOR IMMUNIZATION: Primary | ICD-10-CM

## 2020-10-15 DIAGNOSIS — Z23 NEEDS FLU SHOT: ICD-10-CM

## 2020-10-15 DIAGNOSIS — Z00.00 MEDICARE ANNUAL WELLNESS VISIT, SUBSEQUENT: ICD-10-CM

## 2020-10-15 DIAGNOSIS — Z13.39 SCREENING FOR ALCOHOLISM: ICD-10-CM

## 2020-10-15 DIAGNOSIS — M25.552 LEFT HIP PAIN: ICD-10-CM

## 2020-10-15 PROCEDURE — G0439 PPPS, SUBSEQ VISIT: HCPCS | Performed by: FAMILY MEDICINE

## 2020-10-15 PROCEDURE — 90694 VACC AIIV4 NO PRSRV 0.5ML IM: CPT | Performed by: FAMILY MEDICINE

## 2020-10-15 PROCEDURE — 99213 OFFICE O/P EST LOW 20 MIN: CPT | Performed by: FAMILY MEDICINE

## 2020-10-15 PROCEDURE — G0463 HOSPITAL OUTPT CLINIC VISIT: HCPCS | Performed by: FAMILY MEDICINE

## 2020-10-15 PROCEDURE — 90694 VACC AIIV4 NO PRSRV 0.5ML IM: CPT

## 2020-10-15 RX ORDER — TIZANIDINE 4 MG/1
4 TABLET ORAL
Qty: 30 TAB | Refills: 5 | Status: SHIPPED | OUTPATIENT
Start: 2020-10-15 | End: 2020-11-17

## 2020-10-15 NOTE — PROGRESS NOTES
Chief Complaint   Patient presents with    Back Pain    Hip Pain     Health Maintenance reviewed     1. Have you been to the ER, urgent care clinic since your last visit? Hospitalized since your last visit? No     2. Have you seen or consulted any other health care providers outside of the [address] [place] [address] since your last visit? Include any pap smears or colon screening.  Yes, on file

## 2020-10-15 NOTE — PROGRESS NOTES
Chief Complaint   Patient presents with    Back Pain    Hip Pain     Pt reports that he had back surgery many years ago. Pt was having pain last year, seemed to improve on its own. Pt reports that low back pain has gotten worse, increased pain in the midline low back, also had several occassions where he felt like his left leg was going to give way. Subjective: (As above and below)     Chief Complaint   Patient presents with    Back Pain    Hip Pain     he is a 77y.o. year old male who presents for evaluation. Reviewed PmHx, RxHx, FmHx, SocHx, AllgHx and updated in chart. Review of Systems - negative except as listed above    Objective:     Vitals:    10/15/20 1421   BP: 138/89   Pulse: 82   Resp: 18   Temp: 97.6 °F (36.4 °C)   TempSrc: Temporal   SpO2: 96%   Weight: 223 lb (101.2 kg)   Height: 5' 11\" (1.803 m)     Physical Examination: General appearance - alert, well appearing, and in no distress  Mental status - normal mood, behavior, speech, dress, motor activity, and thought processes  Ears - bilateral TM's and external ear canals normal  Chest - clear to auscultation, no wheezes, rales or rhonchi, symmetric air entry  Heart - normal rate, regular rhythm, normal S1, S2, no murmurs, rubs, clicks or gallops  Back exam - limited range of motion, pain with motion noted during exam  Musculoskeletal - no joint tenderness, deformity or swelling  Extremities - peripheral pulses normal, no pedal edema, no clubbing or cyanosis    Assessment/ Plan:   1. Encounter for immunization    2. Needs flu shot  - FLU (FLUAD QUAD INFLUENZA VACCINE,QUAD,ADJUVANTED)    3. Chronic midline low back pain with left-sided sciatica  -refer to ortho and PT  - REFERRAL TO ORTHOPEDIC SURGERY  - REFERRAL TO PHYSICAL THERAPY    4. Left hip pain  - REFERRAL TO ORTHOPEDIC SURGERY  - REFERRAL TO PHYSICAL THERAPY    5. Medicare annual wellness visit, subsequent  -see below    6.  Screening for alcoholism       I have discussed the diagnosis with the patient and the intended plan as seen in the above orders. The patient has received an after-visit summary and questions were answered concerning future plans. Medication Side Effects and Warnings were discussed with patient: yes  Patient Labs were reviewed: yes  Patient Past Records were reviewed:  yes    Juan Ramirez M.D. This is the Subsequent Medicare Annual Wellness Exam, performed 12 months or more after the Initial AWV or the last Subsequent AWV    I have reviewed the patient's medical history in detail and updated the computerized patient record. History     Patient Active Problem List   Diagnosis Code    Type II diabetes mellitus, uncontrolled ([facility] [address] E11.65    Essential hypertension, benign I10    Vitamin D deficiency E55.9    Cubital tunnel syndrome G56.20     Past Medical History:   Diagnosis Date    Arthritis of knee     Elevated PSA     Pneumonia 2007    Type II or unspecified type diabetes mellitus without mention of complication, uncontrolled late 1990s    Unspecified essential hypertension     Unspecified vitamin D deficiency       Past Surgical History:   Procedure Laterality Date    HX BACK SURGERY      HX CYST INCISION AND DRAINAGE      chest wall boil    HX HERNIA REPAIR      bilateral    HX ORTHOPAEDIC      left 5th toe surgery    HX ORTHOPAEDIC      right elbow surgery    HX ORTHOPAEDIC  03/27/2019    left carpal tunnel and ulnar nerve release    HX SEPTOPLASTY       Current Outpatient Medications   Medication Sig Dispense Refill    tiZANidine (ZANAFLEX) 4 mg tablet Take 1 Tab by mouth nightly. 30 Tab 5    cyanocobalamin, vitamin B-12, (VITAMIN B-12 PO) Take 5,000 mcg by mouth daily.  hydroCHLOROthiazide (HYDRODIURIL) 12.5 mg tablet Take 1 Tab by mouth daily.  90 Tab 3    metFORMIN ER (GLUCOPHAGE XR) 500 mg tablet TAKE FOUR TABLETS BY MOUTH ONCE DAILY 360 Tab 3    insulin NPH/insulin regular (NOVOLIN 70/30 U-100 INSULIN) 100 unit/mL (70-30) injection Inject 40 units before breakfast and 35 units before dinner + 5 units for every 50 mg/dl above 150 mg/dl--dispense relion brand--Dose change 2/29/20--updated med list--did not send prescription to the pharmacy 7 Vial 3    cholecalciferol (VITAMIN D3) (1000 Units /25 mcg) tablet Take  by mouth daily.  dilTIAZem (TIAZAC) 360 mg ER capsule Take 1 Cap by mouth daily. Replaces the 240 mg dose 30 Cap 11    MEN'S MULTI-VITAMIN PO Take  by mouth.  acetaminophen (TYLENOL ARTHRITIS PAIN) 650 mg TbER Take 650 mg by mouth every eight (8) hours.  losartan-hydroCHLOROthiazide (HYZAAR) 100-12.5 mg per tablet Take 1 Tab by mouth daily. 90 Tab 3    atorvastatin (LIPITOR) 10 mg tablet Take 1 Tab by mouth daily. 90 Tab 3    aspirin 81 mg chewable tablet Take 81 mg by mouth daily. Allergies   Allergen Reactions    Jardiance [Empagliflozin] Other (comments)     Yeast infection       Family History   Problem Relation Age of Onset    Diabetes Mother     Diabetes Maternal Grandmother     Stroke Father 79     Social History     Tobacco Use    Smoking status: Never Smoker    Smokeless tobacco: Never Used   Substance Use Topics    Alcohol use: Yes     Comment: beer once a year       Depression Risk Factor Screening:     3 most recent PHQ Screens 1/29/2020   Little interest or pleasure in doing things Not at all   Feeling down, depressed, irritable, or hopeless Not at all   Total Score PHQ 2 0       Alcohol Risk Screen   Do you average more than 1 drink per night or more than 7 drinks a week: No    In the past three months have you have had more than 4 drinks containing alcohol on one occasion: No        Functional Ability and Level of Safety:   Hearing: Hearing is good. Activities of Daily Living: The home contains: handrails and grab bars  Patient does total self care     Ambulation: with mild difficulty     Fall Risk:  Fall Risk Assessment, last 12 mths 1/29/2020   Able to walk? Yes   Fall in past 12 months? No     Abuse Screen:  Patient is not abused       Cognitive Screening   Has your family/caregiver stated any concerns about your memory: no      Patient Care Team   Patient Care Team:  Sarah Xiong MD as PCP - General (Family Medicine)  Whit Waggoner MD as PCP - [facility] Empaneled Provider  Bhupinder Arteaga MD as Consulting Provider (Endocrinology)  Israel Pinedo MD (Orthopedic Surgery)  Nereyda Hernandez, RN as Ambulatory Care Manager    Assessment/Plan   Education and counseling provided:  Are appropriate based on today's review and evaluation    Diagnoses and all orders for this visit:    1. Encounter for immunization    2. Needs flu shot  -     FLU (FLUAD QUAD INFLUENZA VACCINE,QUAD,ADJUVANTED)    3. Chronic midline low back pain with left-sided sciatica  -     REFERRAL TO ORTHOPEDIC SURGERY  -     REFERRAL TO PHYSICAL THERAPY    4. Left hip pain  -     REFERRAL TO ORTHOPEDIC SURGERY  -     REFERRAL TO PHYSICAL THERAPY    5. Medicare annual wellness visit, subsequent    6. Screening for alcoholism    Other orders  -     tiZANidine (ZANAFLEX) 4 mg tablet; Take 1 Tab by mouth nightly.         Health Maintenance Due   Topic Date Due    Hepatitis C Screening  1955    Medicare Yearly Exam  10/29/2018    MICROALBUMIN Q1  06/27/2020    Flu Vaccine (1) 09/01/2020    A1C test (Diabetic or Prediabetic)  09/17/2020

## 2020-10-15 NOTE — PATIENT INSTRUCTIONS
Medicare Wellness Visit, Male The best way to live healthy is to have a lifestyle where you eat a well-balanced diet, exercise regularly, limit alcohol use, and quit all forms of tobacco/nicotine, if applicable. Regular preventive services are another way to keep healthy. Preventive services (vaccines, screening tests, monitoring & exams) can help personalize your care plan, which helps you manage your own care. Screening tests can find health problems at the earliest stages, when they are easiest to treat. [facility] follows the current, evidence-based guidelines published by the [facility] ([facility]) when recommending preventive services for our patients. Because we follow these guidelines, sometimes recommendations change over time as research supports it. (For example, a prostate screening blood test is no longer routinely recommended for men with no symptoms). Of course, you and your doctor may decide to screen more often for some diseases, based on your risk and co-morbidities (chronic disease you are already diagnosed with). Preventive services for you include: - Medicare offers their members a free annual wellness visit, which is time for you and your primary care provider to discuss and plan for your preventive service needs. Take advantage of this benefit every year! 
-All adults over age 72 should receive the recommended pneumonia vaccines. Current USPSTF guidelines recommend a series of two vaccines for the best pneumonia protection.  
-All adults should have a flu vaccine yearly and tetanus vaccine every 10 years. 
-All adults age 48 and older should receive the shingles vaccines (series of two vaccines).       
-All adults age 38-68 who are overweight should have a diabetes screening test once every three years.  
-Other screening tests & preventive services for persons with diabetes include: an eye exam to screen for diabetic retinopathy, a kidney function test, a foot exam, and stricter control over your cholesterol.  
-Cardiovascular screening for adults with routine risk involves an electrocardiogram (ECG) at intervals determined by the provider.  
-Colorectal cancer screening should be done for adults age 54-65 with no increased risk factors for colorectal cancer. There are a number of acceptable methods of screening for this type of cancer. Each test has its own benefits and drawbacks. Discuss with your provider what is most appropriate for you during your annual wellness visit. The different tests include: colonoscopy (considered the best screening method), a fecal occult blood test, a fecal DNA test, and sigmoidoscopy. 
-All adults born between [facility] should be screened once for Hepatitis C. 
-An Abdominal Aortic Aneurysm (AAA) Screening is recommended for men age 73-68 who has ever smoked in their lifetime. Here is a list of your current Health Maintenance items (your personalized list of preventive services) with a due date: 
Health Maintenance Due Topic Date Due  
 Hepatitis C Test  1955  Albumin Urine Test  06/27/2020  Hemoglobin A1C    09/17/2020

## 2020-11-03 DIAGNOSIS — E78.5 HYPERLIPIDEMIA LDL GOAL <100: ICD-10-CM

## 2020-11-03 DIAGNOSIS — E55.9 VITAMIN D DEFICIENCY: ICD-10-CM

## 2020-11-03 DIAGNOSIS — E11.65 UNCONTROLLED TYPE 2 DIABETES MELLITUS WITH HYPERGLYCEMIA (HCC): ICD-10-CM

## 2020-11-03 DIAGNOSIS — I10 ESSENTIAL HYPERTENSION, BENIGN: ICD-10-CM

## 2020-11-15 LAB
ALBUMIN SERPL-MCNC: 4.5 G/DL (ref 3.8–4.8)
ALBUMIN/CREAT UR: 7 MG/G CREAT (ref 0–29)
ALBUMIN/GLOB SERPL: 1.5 {RATIO} (ref 1.2–2.2)
ALP SERPL-CCNC: 98 IU/L (ref 39–117)
ALT SERPL-CCNC: 12 IU/L (ref 0–44)
AST SERPL-CCNC: 16 IU/L (ref 0–40)
BILIRUB SERPL-MCNC: 0.4 MG/DL (ref 0–1.2)
BUN SERPL-MCNC: 11 MG/DL (ref 8–27)
BUN/CREAT SERPL: 14 (ref 10–24)
CALCIUM SERPL-MCNC: 9.5 MG/DL (ref 8.6–10.2)
CHLORIDE SERPL-SCNC: 98 MMOL/L (ref 96–106)
CHOLEST SERPL-MCNC: 118 MG/DL (ref 100–199)
CO2 SERPL-SCNC: 27 MMOL/L (ref 20–29)
CREAT SERPL-MCNC: 0.8 MG/DL (ref 0.76–1.27)
CREAT UR-MCNC: 284.8 MG/DL
EST. AVERAGE GLUCOSE BLD GHB EST-MCNC: 269 MG/DL
GLOBULIN SER CALC-MCNC: 3.1 G/DL (ref 1.5–4.5)
GLUCOSE SERPL-MCNC: 182 MG/DL (ref 65–99)
HBA1C MFR BLD: 11 % (ref 4.8–5.6)
HDLC SERPL-MCNC: 45 MG/DL
INTERPRETATION, 910389: NORMAL
LDLC SERPL CALC-MCNC: 59 MG/DL (ref 0–99)
Lab: NORMAL
MICROALBUMIN UR-MCNC: 19.3 UG/ML
POTASSIUM SERPL-SCNC: 4.3 MMOL/L (ref 3.5–5.2)
PROT SERPL-MCNC: 7.6 G/DL (ref 6–8.5)
SODIUM SERPL-SCNC: 138 MMOL/L (ref 134–144)
TRIGL SERPL-MCNC: 66 MG/DL (ref 0–149)
VLDLC SERPL CALC-MCNC: 14 MG/DL (ref 5–40)

## 2020-11-17 ENCOUNTER — VIRTUAL VISIT (OUTPATIENT)
Dept: ENDOCRINOLOGY | Age: 65
End: 2020-11-17
Payer: MEDICARE

## 2020-11-17 DIAGNOSIS — E78.5 HYPERLIPIDEMIA LDL GOAL <100: ICD-10-CM

## 2020-11-17 DIAGNOSIS — E11.65 UNCONTROLLED TYPE 2 DIABETES MELLITUS WITH HYPERGLYCEMIA (HCC): Primary | ICD-10-CM

## 2020-11-17 DIAGNOSIS — I10 ESSENTIAL HYPERTENSION, BENIGN: ICD-10-CM

## 2020-11-17 DIAGNOSIS — E55.9 VITAMIN D DEFICIENCY: ICD-10-CM

## 2020-11-17 PROCEDURE — 99442 PR PHYS/QHP TELEPHONE EVALUATION 11-20 MIN: CPT | Performed by: INTERNAL MEDICINE

## 2020-11-17 NOTE — LETTER
11/17/2020 Mr. Eliud Mix [address] [place] [id number] Dear Eliud Mix: Please find your most recent results below. Resulted Orders METABOLIC PANEL, COMPREHENSIVE (Collected: 11/13/2020  2:30 PM) Result Value Ref Range Glucose 182 (H) 65 - 99 mg/dL BUN 11 8 - 27 mg/dL Creatinine 0.80 0.76 - 1.27 mg/dL GFR est non-AA 92 >59 mL/min/1.73 GFR est  >59 mL/min/1.73  
 BUN/Creatinine ratio 14 10 - 24 Sodium 138 134 - 144 mmol/L Potassium 4.3 3.5 - 5.2 mmol/L Chloride 98 96 - 106 mmol/L  
 CO2 27 20 - 29 mmol/L Calcium 9.5 8.6 - 10.2 mg/dL Protein, total 7.6 6.0 - 8.5 g/dL Albumin 4.5 3.8 - 4.8 g/dL GLOBULIN, TOTAL 3.1 1.5 - 4.5 g/dL A-G Ratio 1.5 1.2 - 2.2 Bilirubin, total 0.4 0.0 - 1.2 mg/dL Alk. phosphatase 98 39 - 117 IU/L  
 AST (SGOT) 16 0 - 40 IU/L  
 ALT (SGPT) 12 0 - 44 IU/L Narrative Performed at:  [address]  [id number] : Melvina Thorne MD, Phone:  [phone] LIPID PANEL (Collected: 11/13/2020  2:30 PM) Result Value Ref Range Cholesterol, total 118 100 - 199 mg/dL Triglyceride 66 0 - 149 mg/dL HDL Cholesterol 45 >39 mg/dL VLDL Cholesterol Herman 14 5 - 40 mg/dL LDL Chol Calc ([facility]) 59 0 - 99 mg/dL Narrative Performed at:  [address]  [id number] : Melvina Thorne MD, Phone:  [phone] MICROALBUMIN, UR, RAND W/ MICROALB/CREAT RATIO (Collected: 11/13/2020  2:30 PM) Result Value Ref Range Creatinine, urine 284.8 Not Estab. mg/dL Microalbumin, urine 19.3 Not Estab. ug/mL Microalb/Creat ratio (ug/mg creat.) 7 0 - 29 mg/g creat Comment:  
                          Normal:                0 -  29 Moderately increased: 30 - 300 Severely increased:       >300 Narrative Performed at:  [address]  [id number] : Yasir Marin MD, Phone:  [phone] HEMOGLOBIN A1C WITH EAG (Collected: 11/13/2020  2:30 PM) Result Value Ref Range Hemoglobin A1c 11.0 (H) 4.8 - 5.6 % Comment:  
            Prediabetes: 5.7 - 6.4 Diabetes: >6.4 Glycemic control for adults with diabetes: <7.0 Estimated average glucose 269 mg/dL Narrative Performed at:  [address]  [id number] : Yasir Marin MD, Phone:  [phone] CVD REPORT (Collected: 11/13/2020  2:30 PM) Result Value Ref Range INTERPRETATION Note Comment:  
   Supplemental report is available. Narrative Performed at:  [facility] [address]  [id number] : Jed Lehman MD, Phone:  [phone] DIABETES PATIENT EDUCATION (Collected: 11/13/2020  2:30 PM) Result Value Ref Range PDF Image Not applicable Narrative Performed at:  [facility] [address]  [id number] : Jed Lehman MD, Phone:  [phone]  
 
 
1) If you blood pressure is staying over 140 on the top number after another 6-8 weeks, then let me know as we should add amlodipine to get your blood pressure down. 2) Your liver and kidney and cholesterol and urine are all normal. 
 
3) Your A1c has gone up to 11% from 10.2% in June. 4) Increase your insulin to 50 units before breakfast and before dinner. Do your best to take this 5-10 minutes BEFORE you eat. 5) Please come for a follow up visit on 5/18/21 at 1:50pm in our [address] [place] [address] office. 6) Take the enclosed lab slip to repeat your labs prior to next visit. Please call me if you have any questions: [phone] Sincerely, 
 
 
Mykel Rae MD

## 2020-11-17 NOTE — PROGRESS NOTES
Chief Complaint   Patient presents with    Diabetes     pcp and pharmacy confirmed    Other     [phone] phone call       **THIS IS A VIRTUAL VISIT VIA A TELEPHONE ENCOUNTER. PATIENT AGREED TO HAVE THEIR CARE DELIVERED OVER THE PHONE IN PLACE OF THEIR REGULARLY SCHEDULED OFFICE VISIT**    History of Present Illness: Jessica Awan is a 77 y.o. male here for follow up of diabetes. His 81 yo mother passed away on 10/25/20 and had heart trouble so this has been hard on him. Has not been able to check his sugars due to worsening neuropathy in his hands but does take 45 units bid along with metformin 4 tabs daily but may not always get the evening dose before dinner. Can't afford the freestyle marcus. Has been compliant with his BP regimen and when he first checked it today it was 166/83 and a few minutes later it was down to 146/77. Not having many readings under 579 systolic. Does not drink caffeine nor add salt to his food. Current Outpatient Medications   Medication Sig    cyanocobalamin, vitamin B-12, (VITAMIN B-12 PO) Take 5,000 mcg by mouth daily.  hydroCHLOROthiazide (HYDRODIURIL) 12.5 mg tablet Take 1 Tab by mouth daily.  metFORMIN ER (GLUCOPHAGE XR) 500 mg tablet TAKE FOUR TABLETS BY MOUTH ONCE DAILY    insulin NPH/insulin regular (NOVOLIN 70/30 U-100 INSULIN) 100 unit/mL (70-30) injection Inject 40 units before breakfast and 35 units before dinner + 5 units for every 50 mg/dl above 150 mg/dl--dispense relion brand--Dose change 2/29/20--updated med list--did not send prescription to the pharmacy    cholecalciferol (VITAMIN D3) (1000 Units /25 mcg) tablet Take  by mouth daily.  dilTIAZem (TIAZAC) 360 mg ER capsule Take 1 Cap by mouth daily. Replaces the 240 mg dose    acetaminophen (TYLENOL ARTHRITIS PAIN) 650 mg TbER Take 650 mg by mouth every eight (8) hours.  losartan-hydroCHLOROthiazide (HYZAAR) 100-12.5 mg per tablet Take 1 Tab by mouth daily.     atorvastatin (LIPITOR) 10 mg tablet Take 1 Tab by mouth daily.  aspirin 81 mg chewable tablet Take 81 mg by mouth daily. No current facility-administered medications for this visit. Allergies   Allergen Reactions    Jardiance [Empagliflozin] Other (comments)     Yeast infection     Review of Systems: PER HPI    Physical Examination: NOT PERFORMED DUE TO THIS BEING A TELEPHONE CALL      Data Reviewed:   Component      Latest Ref Rng & Units 11/13/2020 11/13/2020 11/13/2020 11/13/2020           2:30 PM  2:30 PM  2:30 PM  2:30 PM   Glucose      65 - 99 mg/dL    182 (H)   BUN      8 - 27 mg/dL    11   Creatinine      0.76 - 1.27 mg/dL    0.80   GFR est non-AA      >59 mL/min/1.73    92   GFR est AA      >59 mL/min/1.73    107   BUN/Creatinine ratio      10 - 24    14   Sodium      134 - 144 mmol/L    138   Potassium      3.5 - 5.2 mmol/L    4.3   Chloride      96 - 106 mmol/L    98   CO2      20 - 29 mmol/L    27   Calcium      8.6 - 10.2 mg/dL    9.5   Protein, total      6.0 - 8.5 g/dL    7.6   Albumin      3.8 - 4.8 g/dL    4.5   GLOBULIN, TOTAL      1.5 - 4.5 g/dL    3.1   A-G Ratio      1.2 - 2.2    1.5   Bilirubin, total      0.0 - 1.2 mg/dL    0.4   Alk. phosphatase      39 - 117 IU/L    98   AST      0 - 40 IU/L    16   ALT      0 - 44 IU/L    12   Cholesterol, total      100 - 199 mg/dL   118    Triglyceride      0 - 149 mg/dL   66    HDL Cholesterol      >39 mg/dL   45    VLDL Cholesterol Herman      5 - 40 mg/dL   14    LDL Cholesterol      0 - 99 mg/dL   59    Creatinine, urine      Not Estab. mg/dL  284.8     Microalbumin, urine      Not Estab. ug/mL  19.3     Microalbumin/Creat. Ratio      0 - 29 mg/g creat  7     Hemoglobin A1c, (calculated)      4.8 - 5.6 % 11.0 (H)      Estimated average glucose      mg/dL 269          Assessment/Plan:     1.  Type II or unspecified type diabetes mellitus without mention of complication, uncontrolled ([facility]): his most recent Hgb A1c was 11% in 11/20 up from 10.2% in 6/20 down from 11% in 2/20 down from 12% in 6/19 up from 9.4% in 10/18 up from 9.2% in 2/18 down from 9.9% in 9/17 up from 9.4% in 3/17 down from 10.6% in 10/16 up from 9.9% in 4/16 down from 11.4% in 1/16 up from 10.2% in 9/15 up from 10% in 1/15. Currently not checking at all so will focus on taking before meals and increase his doses by 5 units. - increase 70/30 insulin to 50 units before breakfast and dinner   - cont metformin  mg tabs 2 tabs bid  - check bs twice daily   - foot exam done 2/20  - optho UTD 11/19  - microalbumin nl 11/20  - check A1c and bmp prior to next visit  - check Hgb A1c, cmp, and microalbumin in 11/21        2. Unspecified essential hypertension: his BP was above goal < 140/90 so may need to add amlodipine 5 mg as next med  - cont diltiazem 360 mg daily  - cont losartan/hct 100/12.5 mg daily  - cont hctz 12.5 mg daily  - monitor home blood pressure readings        3. Unspecified vitamin D deficiency: Level was 28.1 in 9/15 and with taking 1000 units daily up to 37 in 1/16. Down to 29 in 10/16 so increased to 2000 units daily and up to 41 in 3/17 and in 2/18. Down to 33.9 in 6/19 and 51 in 6/20  - cont vitamin D 2000 units daily  - check Vitamin D 25-OH level prior to next visit        4. Hyperlipidemia: Given DM, Goal LDL < 100, non-HDL < 130, and TG < 150. LDL 79 in 1/15 and 68 in 9/15 and 70 in 4/16 and 94 in 3/17 not on meds. Up to 100 in 2/18 and 103 in 10/18 and started on lipitor 10 mg in summer 2019 and down to 59 in 2/20 and in 11/20  - cont lipitor 10 mg daily  - check lipids in 11/21    Patient Instructions   1) If you blood pressure is staying over 140 on the top number after another 6-8 weeks, then let me know as we should add amlodipine to get your blood pressure down. 2) Your liver and kidney and cholesterol and urine are all normal.    3) Your A1c has gone up to 11% from 10.2% in June. 4) Increase your insulin to 50 units before breakfast and before dinner.   Do your best to take this 5-10 minutes BEFORE you eat. 5) Please come for a follow up visit on 5/18/21 at 1:50pm in our [place] office. 6) Take the enclosed lab slip to repeat your labs prior to next visit. Follow-up and Dispositions    · Return 5/18/21 at 1:50pm.             We spent 16 minutes of total time together during this virtual visit with a telephone encounter. All questions were answered and a copy of the instructions were sent to him via a letter.     Copy sent to:  Reid Waggoner MD

## 2020-11-17 NOTE — PATIENT INSTRUCTIONS
1) If you blood pressure is staying over 140 on the top number after another 6-8 weeks, then let me know as we should add amlodipine to get your blood pressure down. 2) Your liver and kidney and cholesterol and urine are all normal. 
 
3) Your A1c has gone up to 11% from 10.2% in June. 4) Increase your insulin to 50 units before breakfast and before dinner. Do your best to take this 5-10 minutes BEFORE you eat. 5) Please come for a follow up visit on 5/18/21 at 1:50pm in our [place] office. 6) Take the enclosed lab slip to repeat your labs prior to next visit.

## 2021-02-11 DIAGNOSIS — E11.65 UNCONTROLLED TYPE 2 DIABETES MELLITUS WITH HYPERGLYCEMIA (HCC): ICD-10-CM

## 2021-02-11 NOTE — TELEPHONE ENCOUNTER
Pt is out of refills. Using new pharmacy. Please send script to [facility]. Requested Prescriptions     Pending Prescriptions Disp Refills    atorvastatin (LIPITOR) 10 mg tablet 90 Tab 3     Sig: Take 1 Tab by mouth daily.      Please call if there are any questions [phone]    Thank you,  Vj Matias

## 2021-02-12 RX ORDER — ATORVASTATIN CALCIUM 10 MG/1
10 TABLET, FILM COATED ORAL DAILY
Qty: 90 TAB | Refills: 1 | Status: SHIPPED | OUTPATIENT
Start: 2021-02-12 | End: 2021-08-15

## 2021-02-24 RX ORDER — DILTIAZEM HYDROCHLORIDE 360 MG/1
CAPSULE, EXTENDED RELEASE ORAL
Qty: 90 CAP | Refills: 3 | Status: SHIPPED | OUTPATIENT
Start: 2021-02-24 | End: 2022-02-23

## 2021-04-26 DIAGNOSIS — I10 ESSENTIAL HYPERTENSION, BENIGN: ICD-10-CM

## 2021-04-26 RX ORDER — LOSARTAN POTASSIUM AND HYDROCHLOROTHIAZIDE 12.5; 1 MG/1; MG/1
TABLET ORAL
Qty: 90 TAB | Refills: 0 | Status: SHIPPED | OUTPATIENT
Start: 2021-04-26 | End: 2021-07-27

## 2021-05-04 DIAGNOSIS — E55.9 VITAMIN D DEFICIENCY: ICD-10-CM

## 2021-05-04 DIAGNOSIS — E11.65 UNCONTROLLED TYPE 2 DIABETES MELLITUS WITH HYPERGLYCEMIA (HCC): ICD-10-CM

## 2021-05-04 DIAGNOSIS — E78.5 HYPERLIPIDEMIA LDL GOAL <100: ICD-10-CM

## 2021-05-04 DIAGNOSIS — I10 ESSENTIAL HYPERTENSION, BENIGN: ICD-10-CM

## 2021-05-04 LAB
25(OH)D3+25(OH)D2 SERPL-MCNC: 54.5 NG/ML (ref 30–100)
BUN SERPL-MCNC: 10 MG/DL (ref 8–27)
BUN/CREAT SERPL: 11 (ref 10–24)
CALCIUM SERPL-MCNC: 9.7 MG/DL (ref 8.6–10.2)
CHLORIDE SERPL-SCNC: 100 MMOL/L (ref 96–106)
CO2 SERPL-SCNC: 25 MMOL/L (ref 20–29)
CREAT SERPL-MCNC: 0.89 MG/DL (ref 0.76–1.27)
EST. AVERAGE GLUCOSE BLD GHB EST-MCNC: 283 MG/DL
GLUCOSE SERPL-MCNC: 184 MG/DL (ref 65–99)
HBA1C MFR BLD: 11.5 % (ref 4.8–5.6)
POTASSIUM SERPL-SCNC: 4.2 MMOL/L (ref 3.5–5.2)
SODIUM SERPL-SCNC: 138 MMOL/L (ref 134–144)

## 2021-05-09 RX ORDER — METFORMIN HYDROCHLORIDE 500 MG/1
TABLET, EXTENDED RELEASE ORAL
Qty: 360 TAB | Refills: 3 | Status: SHIPPED | OUTPATIENT
Start: 2021-05-09 | End: 2022-05-25 | Stop reason: SDUPTHER

## 2021-05-10 RX ORDER — HYDROCHLOROTHIAZIDE 12.5 MG/1
TABLET ORAL
Qty: 90 TAB | Refills: 3 | Status: SHIPPED | OUTPATIENT
Start: 2021-05-10 | End: 2021-12-03 | Stop reason: ALTCHOICE

## 2021-05-11 ENCOUNTER — OFFICE VISIT (OUTPATIENT)
Dept: FAMILY MEDICINE CLINIC | Age: 66
End: 2021-05-11
Payer: MEDICARE

## 2021-05-11 VITALS
HEIGHT: 71 IN | WEIGHT: 221 LBS | RESPIRATION RATE: 16 BRPM | DIASTOLIC BLOOD PRESSURE: 81 MMHG | BODY MASS INDEX: 30.94 KG/M2 | OXYGEN SATURATION: 98 % | HEART RATE: 73 BPM | TEMPERATURE: 98 F | SYSTOLIC BLOOD PRESSURE: 149 MMHG

## 2021-05-11 DIAGNOSIS — I10 ESSENTIAL HYPERTENSION, BENIGN: Primary | ICD-10-CM

## 2021-05-11 DIAGNOSIS — E11.65 UNCONTROLLED TYPE 2 DIABETES MELLITUS WITH HYPERGLYCEMIA (HCC): ICD-10-CM

## 2021-05-11 DIAGNOSIS — B02.9 HERPES ZOSTER WITHOUT COMPLICATION: ICD-10-CM

## 2021-05-11 PROCEDURE — 1101F PT FALLS ASSESS-DOCD LE1/YR: CPT | Performed by: FAMILY MEDICINE

## 2021-05-11 PROCEDURE — 99214 OFFICE O/P EST MOD 30 MIN: CPT | Performed by: FAMILY MEDICINE

## 2021-05-11 PROCEDURE — G8754 DIAS BP LESS 90: HCPCS | Performed by: FAMILY MEDICINE

## 2021-05-11 PROCEDURE — G8753 SYS BP > OR = 140: HCPCS | Performed by: FAMILY MEDICINE

## 2021-05-11 PROCEDURE — G0463 HOSPITAL OUTPT CLINIC VISIT: HCPCS | Performed by: FAMILY MEDICINE

## 2021-05-11 PROCEDURE — G8536 NO DOC ELDER MAL SCRN: HCPCS | Performed by: FAMILY MEDICINE

## 2021-05-11 PROCEDURE — 2022F DILAT RTA XM EVC RTNOPTHY: CPT | Performed by: FAMILY MEDICINE

## 2021-05-11 PROCEDURE — G8427 DOCREV CUR MEDS BY ELIG CLIN: HCPCS | Performed by: FAMILY MEDICINE

## 2021-05-11 PROCEDURE — 3017F COLORECTAL CA SCREEN DOC REV: CPT | Performed by: FAMILY MEDICINE

## 2021-05-11 PROCEDURE — 3046F HEMOGLOBIN A1C LEVEL >9.0%: CPT | Performed by: FAMILY MEDICINE

## 2021-05-11 PROCEDURE — G8417 CALC BMI ABV UP PARAM F/U: HCPCS | Performed by: FAMILY MEDICINE

## 2021-05-11 PROCEDURE — G8510 SCR DEP NEG, NO PLAN REQD: HCPCS | Performed by: FAMILY MEDICINE

## 2021-05-11 RX ORDER — AMLODIPINE BESYLATE 5 MG/1
5 TABLET ORAL DAILY
Qty: 90 TAB | Refills: 1 | Status: SHIPPED | OUTPATIENT
Start: 2021-05-11 | End: 2021-06-28

## 2021-05-11 RX ORDER — VALACYCLOVIR HYDROCHLORIDE 1 G/1
1000 TABLET, FILM COATED ORAL 3 TIMES DAILY
Qty: 21 TAB | Refills: 0 | Status: SHIPPED | OUTPATIENT
Start: 2021-05-11 | End: 2021-05-18

## 2021-05-11 NOTE — PROGRESS NOTES
Liz De Los Santos is a 77 y.o. male  Chief Complaint   Patient presents with   [facility] Ext Maintenance Due   Topic Date Due    Hepatitis C Screening  Never done    Shingrix Vaccine Age 50> (1 of 2) Never done    Pneumococcal 65+ years (2 of 2 - PPSV23) 12/19/2019    Foot Exam Q1  02/10/2021     Visit Vitals  BP (!) 168/79 (BP 1 Location: Right upper arm, BP Patient Position: Sitting, BP Cuff Size: Large adult)   Pulse 73   Temp 98 °F (36.7 °C) (Oral)   Resp 16   Ht 5' 11\" (1.803 m)   Wt 221 lb (100.2 kg)   SpO2 98%   BMI 30.82 kg/m²     1. Have you been to the ER, urgent care clinic since your last visit? Hospitalized since your last visit? no    2. Have you seen or consulted any other health care providers outside of the [address] [place] [address] since your last visit? Include any pap smears or colon screening.  No    .Kassie Willoughby

## 2021-05-11 NOTE — PATIENT INSTRUCTIONS

## 2021-05-11 NOTE — PROGRESS NOTES
Chief Complaint   Patient presents with    Shingles     Pt reports that he noted some pain and a rash on his right chest starting 3 days ago. Pt has recently had a lot of stress, wife had a stroke, lost a parent last fall. Pt reports that he had discussed elevated BP with his endocrinologist, pt reports that he thinks that his pressures have been staying elevated. Subjective: (As above and below)     Chief Complaint   Patient presents with   Domi Olivas Shingles     he is a 77y.o. year old male who presents for evaluation. Reviewed PmHx, RxHx, FmHx, SocHx, AllgHx and updated in chart. Review of Systems - negative except as listed above    Objective:     Vitals:    05/11/21 0740 05/11/21 0748   BP: (!) 168/79 (!) 149/81   Pulse: 73    Resp: 16    Temp: 98 °F (36.7 °C)    TempSrc: Oral    SpO2: 98%    Weight: 221 lb (100.2 kg)    Height: 5' 11\" (1.803 m)      Physical Examination: General appearance - alert, well appearing, and in no distress  Mental status - normal mood, behavior, speech, dress, motor activity, and thought processes  Ears - bilateral TM's and external ear canals normal  Chest - clear to auscultation, no wheezes, rales or rhonchi, symmetric air entry  Heart - normal rate, regular rhythm, normal S1, S2, no murmurs, rubs, clicks or gallops  Musculoskeletal - no joint tenderness, deformity or swelling  Extremities - peripheral pulses normal, no pedal edema, no clubbing or cyanosis  Skin - blisters on an erythematous base on right chest wrapping around to right mid back, dense blister pattern  Assessment/ Plan:   1. Uncontrolled type 2 diabetes mellitus with hyperglycemia ([facility]  -continue on current medication, monitor diet as able    2. Essential hypertension, benign  -add amlodipine, continue on other medications as written    3.  Herpes zoster without complication  -take valacyclovir as written, reviewed shingles and course of illness       I have discussed the diagnosis with the patient and the intended plan as seen in the above orders. The patient has received an after-visit summary and questions were answered concerning future plans.      Medication Side Effects and Warnings were discussed with patient: yes  Patient Labs were reviewed: yes  Patient Past Records were reviewed:  yes    Dustin Mc M.D.

## 2021-05-17 ENCOUNTER — TELEPHONE (OUTPATIENT)
Dept: ENDOCRINOLOGY | Age: 66
End: 2021-05-17

## 2021-05-17 NOTE — TELEPHONE ENCOUNTER
Pt is not able to make his appointment tomorrow (05/18/21 @ 1:50pm)  because he has to take care of his wife.  Pt would like to know if we can get him in sooner then the next available appointment

## 2021-05-21 ENCOUNTER — VIRTUAL VISIT (OUTPATIENT)
Dept: ENDOCRINOLOGY | Age: 66
End: 2021-05-21
Payer: MEDICARE

## 2021-05-21 DIAGNOSIS — I10 ESSENTIAL HYPERTENSION, BENIGN: ICD-10-CM

## 2021-05-21 DIAGNOSIS — E55.9 VITAMIN D DEFICIENCY: ICD-10-CM

## 2021-05-21 DIAGNOSIS — E11.65 UNCONTROLLED TYPE 2 DIABETES MELLITUS WITH HYPERGLYCEMIA (HCC): Primary | ICD-10-CM

## 2021-05-21 DIAGNOSIS — E78.5 HYPERLIPIDEMIA LDL GOAL <100: ICD-10-CM

## 2021-05-21 PROCEDURE — 99442 PR PHYS/QHP TELEPHONE EVALUATION 11-20 MIN: CPT | Performed by: INTERNAL MEDICINE

## 2021-05-21 NOTE — PATIENT INSTRUCTIONS
1) Keep checking your blood pressure over the next 2 weeks and see if it comes down under 140/90 once the shingles resolves. If so, then you don't need a change. If staying over 140 on the top number by the 1st week of June then let me know and we can increase the amlodipine to 10 mg daily. 2) Your Hemoglobin A1c (3 month test of blood sugar) joey from 11% to 11.5% likely due to stress but also from not getting the insulin on time leading to spikes. Do your best to take 50 units about 10-15 minutes before breakfast and dinner even if these times of the meals to vary day to day. 3) BUN and creatinine are markers of kidney function. Your values are normal. 
 
4) Your vitamin D level is 54 which is normal.  Goal is over 30. Please continue to take your current dose of vitamin D to keep your levels at goal. 
 
5) Please come for a follow up visit on 12/3/21 at 11:30am in our [place] office. 6) Take the enclosed lab slip to repeat your labs prior to your next visit.

## 2021-05-21 NOTE — PROGRESS NOTES
Chief Complaint   Patient presents with   [facility] Diabetes     [phone] phone call    Other     pcp and pharmacy confirmed       **THIS IS A VIRTUAL VISIT VIA A TELEPHONE ENCOUNTER. PATIENT AGREED TO HAVE THEIR CARE DELIVERED OVER THE PHONE IN PLACE OF THEIR REGULARLY SCHEDULED OFFICE VISIT**    History of Present Illness: Marcelina Ledesma is a 77 y.o. male here for follow up of diabetes. His wife had a stroke in January and has memory loss and loss of balance and he has had to take care of her and this has been stressful as he is also caring for the house. Was planning on getting the shingles shot at the beginning of the year but when his wife got the stroke he never went to get this and then developed shingles and saw Dr. Lon Alonso on 5/11/21 and was given valtrex and is starting to feel better. Thinks that at times he is missing his meds as he is focused on caring for his wife. Was also started on amlodipine 5 mg daily at that visit in addition to the other meds he is now. BP is staying in the 150-160s/70-80s despite adding this pill. Plans on using a pill box to help with compliance. Has not checked his sugar at all the past 6 months. May not get this insulin on time and takes both doses often several hours after breakfast and after dinner. Current Outpatient Medications   Medication Sig    amLODIPine (NORVASC) 5 mg tablet Take 1 Tab by mouth daily.  hydroCHLOROthiazide (HYDRODIURIL) 12.5 mg tablet Take 1 tablet by mouth once daily    metFORMIN ER (GLUCOPHAGE XR) 500 mg tablet TAKE 4 TABLETS BY MOUTH ONCE DAILY    losartan-hydroCHLOROthiazide (HYZAAR) 100-12.5 mg per tablet Take 1 tablet by mouth once daily    dilTIAZem ER (TIAZAC) 360 mg capsule TAKE 1 CAPSULE BY MOUTH ONCE DAILY (REPLACES  240  MG  DOSE)    atorvastatin (LIPITOR) 10 mg tablet Take 1 Tab by mouth daily.     insulin NPH/insulin regular (NovoLIN 70/30 U-100 Insulin) 100 unit/mL (70-30) injection Inject 50 units before breakfast and before dinner --dispense relion brand--Dose change 11/17/20--updated med list--did not send prescription to the pharmacy    cyanocobalamin, vitamin B-12, (VITAMIN B-12 PO) Take 5,000 mcg by mouth daily.  cholecalciferol (VITAMIN D3) (1000 Units /25 mcg) tablet Take  by mouth daily.  acetaminophen (TYLENOL ARTHRITIS PAIN) 650 mg TbER Take 650 mg by mouth every eight (8) hours.  aspirin 81 mg chewable tablet Take 81 mg by mouth daily. No current facility-administered medications for this visit. Allergies   Allergen Reactions    Jardiance [Empagliflozin] Other (comments)     Yeast infection     Review of Systems: PER HPI    Physical Examination: NOT PERFORMED DUE TO THIS BEING A TELEPHONE CALL      Data Reviewed:   Component      Latest Ref Rng & Units 5/3/2021 5/3/2021 5/3/2021          10:12 AM 10:12 AM 10:12 AM   Glucose      65 - 99 mg/dL   184 (H)   BUN      8 - 27 mg/dL   10   Creatinine      0.76 - 1.27 mg/dL   0.89   GFR est non-AA      >59 mL/min/1.73   88   GFR est AA      >59 mL/min/1.73   102   BUN/Creatinine ratio      10 - 24   11   Sodium      134 - 144 mmol/L   138   Potassium      3.5 - 5.2 mmol/L   4.2   Chloride      96 - 106 mmol/L   100   CO2      20 - 29 mmol/L   25   Calcium      8.6 - 10.2 mg/dL   9.7   Hemoglobin A1c, (calculated)      4.8 - 5.6 %  11.5 (H)    Estimated average glucose      mg/dL  283    VITAMIN D, 25-HYDROXY      30.0 - 100.0 ng/mL 54.5         Assessment/Plan:     1. Type II or unspecified type diabetes mellitus without mention of complication, uncontrolled ([facility] [address]): his most recent Hgb A1c was 11.5% in 5/21 up from 11% in 11/20 up from 10.2% in 6/20 down from 11% in 2/20 down from 12% in 6/19 up from 9.4% in 10/18 up from 9.2% in 2/18 down from 9.9% in 9/17 up from 9.4% in 3/17 down from 10.6% in 10/16 up from 9.9% in 4/16 down from 11.4% in 1/16 up from 10.2% in 9/15 up from 10% in 1/15.   Currently not checking at all and not taking insulin on time and has been under more stress so will focus on taking before meals and keep doses the same  - cont 70/30 insulin 50 units before breakfast and dinner   - cont metformin  mg tabs 2 tabs bid  - check bs twice daily   - foot exam done 2/20  - optho UTD 11/19  - microalbumin nl 11/20  - check A1c and bmp in 5/22  - check Hgb A1c, cmp, and microalbumin prior to next visit        2. Unspecified essential hypertension: his BP was above goal < 140/90 so may need an increase in amlodipine  - cont amlodipine 5 mg daily  - cont diltiazem 360 mg daily  - cont losartan/hct 100/12.5 mg daily  - cont hctz 12.5 mg daily  - monitor home blood pressure readings        3. Unspecified vitamin D deficiency: Level was 28.1 in 9/15 and with taking 1000 units daily up to 37 in 1/16. Down to 29 in 10/16 so increased to 2000 units daily and up to 41 in 3/17 and in 2/18. Down to 33.9 in 6/19 and 51 in 6/20 and 54 in 5/21  - cont vitamin D 2000 units daily  - check Vitamin D 25-OH level in 5/22        4. Hyperlipidemia: Given DM, Goal LDL < 100, non-HDL < 130, and TG < 150. LDL 79 in 1/15 and 68 in 9/15 and 70 in 4/16 and 94 in 3/17 not on meds. Up to 100 in 2/18 and 103 in 10/18 and started on lipitor 10 mg in summer 2019 and down to 59 in 2/20 and in 11/20  - cont lipitor 10 mg daily  - check lipids prior to next visit      Patient Instructions   1) Keep checking your blood pressure over the next 2 weeks and see if it comes down under 140/90 once the shingles resolves. If so, then you don't need a change. If staying over 140 on the top number by the 1st week of June then let me know and we can increase the amlodipine to 10 mg daily. 2) Your Hemoglobin A1c (3 month test of blood sugar) joey from 11% to 11.5% likely due to stress but also from not getting the insulin on time leading to spikes. Do your best to take 50 units about 10-15 minutes before breakfast and dinner even if these times of the meals to vary day to day.     3) BUN and creatinine are markers of kidney function. Your values are normal.    4) Your vitamin D level is 54 which is normal.  Goal is over 30. Please continue to take your current dose of vitamin D to keep your levels at goal.    5) Please come for a follow up visit on 12/3/21 at 11:30am in our [place] office. 6) Take the enclosed lab slip to repeat your labs prior to your next visit. Follow-up and Dispositions    · Return 12/3/21 at 11:30am.             I spent a total of 19 minutes on the day of this virtual visit with a telephone encounter. All questions were answered and a copy of the instructions were sent to him via a letter.       Copy sent to:  Gelacio Waggoner MD

## 2021-06-28 ENCOUNTER — TELEPHONE (OUTPATIENT)
Dept: ENDOCRINOLOGY | Age: 66
End: 2021-06-28

## 2021-06-28 DIAGNOSIS — I10 ESSENTIAL HYPERTENSION, BENIGN: ICD-10-CM

## 2021-06-28 RX ORDER — AMLODIPINE BESYLATE 10 MG/1
10 TABLET ORAL DAILY
Qty: 90 TABLET | Refills: 3 | Status: SHIPPED | OUTPATIENT
Start: 2021-06-28 | End: 2022-06-06 | Stop reason: SDUPTHER

## 2021-06-28 NOTE — TELEPHONE ENCOUNTER
I called Mr. Dela Cruzvera Reji and relayed the message from Dr. Steve Graham. He understood this information and said he would do as instructed.   Umu Weaver

## 2021-06-28 NOTE — TELEPHONE ENCOUNTER
----- Message from Mateo Mendez sent at 6/28/2021  1:15 PM EDT -----  Regarding: Dr. Rody Balderas telephone  Caller's first and last name: Self    Reason for call: Medication    Callback required yes/no and why: Yes, to discuss medication     Best contact number(s): [phone]    Details to clarify the request: Pt is on \"amlodipyne\" 5mgs, and was instructed to call Dr. Titus Metzger if his BP was still high at this time. Pt is stating that his BP is still high, and would like to discuss increasing medication.

## 2021-06-28 NOTE — TELEPHONE ENCOUNTER
Please let him know that I sent 10 mg amlodipine tabs to his local [facility] to  today. Take 2 of the 5 mg tabs once daily until these run out and then take 1 of the 10 mg tabs once daily. If his blood pressure is still over 140/90 after 2 weeks on the higher dose then let me know.

## 2021-07-14 ENCOUNTER — OFFICE VISIT (OUTPATIENT)
Dept: FAMILY MEDICINE CLINIC | Age: 66
End: 2021-07-14
Payer: MEDICARE

## 2021-07-14 VITALS
TEMPERATURE: 98.2 F | WEIGHT: 223 LBS | DIASTOLIC BLOOD PRESSURE: 77 MMHG | OXYGEN SATURATION: 98 % | HEART RATE: 89 BPM | HEIGHT: 71 IN | RESPIRATION RATE: 14 BRPM | BODY MASS INDEX: 31.22 KG/M2 | SYSTOLIC BLOOD PRESSURE: 148 MMHG

## 2021-07-14 DIAGNOSIS — N48.1 BALANITIS: ICD-10-CM

## 2021-07-14 DIAGNOSIS — I10 ESSENTIAL HYPERTENSION, BENIGN: ICD-10-CM

## 2021-07-14 DIAGNOSIS — B36.0 TINEA VERSICOLOR: ICD-10-CM

## 2021-07-14 DIAGNOSIS — M16.10 HIP ARTHRITIS: Primary | ICD-10-CM

## 2021-07-14 PROCEDURE — G8536 NO DOC ELDER MAL SCRN: HCPCS | Performed by: FAMILY MEDICINE

## 2021-07-14 PROCEDURE — 1101F PT FALLS ASSESS-DOCD LE1/YR: CPT | Performed by: FAMILY MEDICINE

## 2021-07-14 PROCEDURE — G8754 DIAS BP LESS 90: HCPCS | Performed by: FAMILY MEDICINE

## 2021-07-14 PROCEDURE — 99215 OFFICE O/P EST HI 40 MIN: CPT | Performed by: FAMILY MEDICINE

## 2021-07-14 PROCEDURE — G0463 HOSPITAL OUTPT CLINIC VISIT: HCPCS | Performed by: FAMILY MEDICINE

## 2021-07-14 PROCEDURE — G8427 DOCREV CUR MEDS BY ELIG CLIN: HCPCS | Performed by: FAMILY MEDICINE

## 2021-07-14 PROCEDURE — G8510 SCR DEP NEG, NO PLAN REQD: HCPCS | Performed by: FAMILY MEDICINE

## 2021-07-14 PROCEDURE — 3017F COLORECTAL CA SCREEN DOC REV: CPT | Performed by: FAMILY MEDICINE

## 2021-07-14 PROCEDURE — G8753 SYS BP > OR = 140: HCPCS | Performed by: FAMILY MEDICINE

## 2021-07-14 PROCEDURE — G8417 CALC BMI ABV UP PARAM F/U: HCPCS | Performed by: FAMILY MEDICINE

## 2021-07-14 RX ORDER — DICLOFENAC SODIUM 10 MG/G
2 GEL TOPICAL 4 TIMES DAILY
Qty: 100 G | Refills: 3 | Status: SHIPPED | OUTPATIENT
Start: 2021-07-14 | End: 2021-12-03

## 2021-07-14 RX ORDER — KETOCONAZOLE 20 MG/G
CREAM TOPICAL DAILY
Qty: 15 G | Refills: 0 | Status: SHIPPED | OUTPATIENT
Start: 2021-07-14 | End: 2021-12-03

## 2021-07-14 NOTE — PROGRESS NOTES
Brigid Haile is a 77 y.o. male  Chief Complaint   Patient presents with    Shingles    Skin Problem     break out on face    Fatigue    Yeast Infection     Health Maintenance Due   Topic Date Due    Hepatitis C Screening  Never done    Pneumococcal 65+ years (2 of 2 - PPSV23) 12/19/2019    Foot Exam Q1  02/10/2021     Visit Vitals  Ht 5' 11\" (1.803 m)   Wt 223 lb (101.2 kg)   BMI 31.10 kg/m²     1. Have you been to the ER, urgent care clinic since your last visit? Hospitalized since your last visit? No    2. Have you seen or consulted any other health care providers outside of the [address] [place] [address] since your last visit? Include any pap smears or colon screening.  No   .Rosanna Vaughan

## 2021-07-14 NOTE — PROGRESS NOTES
ZINA Mcgill is a 77 y.o. male who presents with a concern about persistent shingles pain and a few other rashes. He  was seen for shingles and a right-sided T5 distribution back in May. The rash healed up in about a month, there are still some dark spots. There is still pain in the area which she was concerned about. Hurts to touch slightly, hurts when his clothing touches it. Has not taken anything or done any topical therapies. He has left hip pain that comes and goes. Took diclofenac pills in the past but was warned off of them by former PCP. Borrowed some of his wife's diclofenac gel a week ago and really liked it for his hip pain. Would like a prescription. Has a balanitis that comes on intermittently. Will notice some swelling of the foreskin and then cracked skin that will last for about a month. He might be over washing during that time. This was more likely to happen when he was taking a SGLT2. He has what appears to be a mild vitiligo versus tinea versicolor versus eczema around the mouth. Favor the latter 2 because it seems to come on, last for a week and then go away. He is bothered by the light and skin color in such a cosmetic place. this is bothered him since he was young man. The only thing is ever found to be helpful is cortisone cream.  His primary question today has to do with preventing flares which happen once or twice a year    PMHx:  Past Medical History:   Diagnosis Date    Arthritis of knee     Elevated PSA     Pneumonia 2007    Type II or unspecified type diabetes mellitus without mention of complication, uncontrolled late 1990s    Unspecified essential hypertension     Unspecified vitamin D deficiency        Meds:   Current Outpatient Medications   Medication Sig Dispense Refill    ketoconazole (NIZORAL) 2 % topical cream Apply  to affected area daily. 15 g 0    diclofenac (VOLTAREN) 1 % gel Apply 2 g to affected area four (4) times daily.  100 g 3  amLODIPine (NORVASC) 10 mg tablet Take 1 Tablet by mouth daily. Delete the 5 mg tabs from profile 90 Tablet 3    hydroCHLOROthiazide (HYDRODIURIL) 12.5 mg tablet Take 1 tablet by mouth once daily 90 Tab 3    metFORMIN ER (GLUCOPHAGE XR) 500 mg tablet TAKE 4 TABLETS BY MOUTH ONCE DAILY 360 Tab 3    losartan-hydroCHLOROthiazide (HYZAAR) 100-12.5 mg per tablet Take 1 tablet by mouth once daily 90 Tab 0    dilTIAZem ER (TIAZAC) 360 mg capsule TAKE 1 CAPSULE BY MOUTH ONCE DAILY (REPLACES  240  MG  DOSE) 90 Cap 3    atorvastatin (LIPITOR) 10 mg tablet Take 1 Tab by mouth daily. 90 Tab 1    insulin NPH/insulin regular (NovoLIN 70/30 U-100 Insulin) 100 unit/mL (70-30) injection Inject 50 units before breakfast and before dinner --dispense relion brand--Dose change 11/17/20--updated med list--did not send prescription to the pharmacy 7 Vial 3    cyanocobalamin, vitamin B-12, (VITAMIN B-12 PO) Take 5,000 mcg by mouth daily.  cholecalciferol (VITAMIN D3) (1000 Units /25 mcg) tablet Take  by mouth daily.  acetaminophen (TYLENOL ARTHRITIS PAIN) 650 mg TbER Take 650 mg by mouth every eight (8) hours.  aspirin 81 mg chewable tablet Take 81 mg by mouth daily. Allergies: Allergies   Allergen Reactions    Jardiance [Empagliflozin] Other (comments)     Yeast infection       Smoker:  Social History     Tobacco Use   Smoking Status Never Smoker   Smokeless Tobacco Never Used       ETOH:   Social History     Substance and Sexual Activity   Alcohol Use Yes    Comment: beer once a year       FH:   Family History   Problem Relation Age of Onset    Diabetes Mother     Diabetes Maternal Grandmother     Stroke Father 79       ROS:   As listed in HPI.  In addition:  Constitutional:   No headache, fever, fatigue, weight loss or weight gain      Cardiac:    No chest pain      Resp:   No cough or shortness of breath      Neuro   No loss of consciousness, dizziness, seizures      Physical Exam:  Blood pressure (!) 148/77, pulse 89, temperature 98.2 °F (36.8 °C), temperature source Oral, resp. rate 14, height 5' 11\" (1.803 m), weight 223 lb (101.2 kg), SpO2 98 %. GEN: No apparent distress. Alert and oriented and responds to all questions appropriately. NEUROLOGIC:  No focal neurologic deficits. Strength and sensation grossly intact. Coordination and gait grossly intact. EXT: Well perfused. No edema. SKIN: Rash of the face around the mouth, small amount going into the angle of the jaw and up to the nasolabial fold. Feels rough to the patient, not to me. Slightly lighter skin with a rash     Assessment and Plan     Hip pain. Back pain versus trochanteric bursitis. Occasionally feels like his hips back to give out which might be arthritis  The pain is having in the lateral hip has been amenable to his wife's diclofenac gel. Will prescribe him some of his own and pointed out this is nonover-the-counter medicine. Balanitis  Not currently present. It is recurrent and based on his description could have a yeasty component. Will prescribe ketoconazole  Encouraged not to be overly aggressive with hygiene. Face rash  Has seen dermatology multiple times for this. Sounds like he has been treated variously for eczema/vitiligo. He is not sure if he served be given a antifungal for the possibility of tinea versicolor, would be unlikely but we will try the ketoconazole on that as well  He has found the steroid cream helpful and the rash only lasts for a week a few times a year. Provided some reassurance and encouraged him not to use anything stronger than the cortisone on his face. Shingles  Has healed up, dark skin where the rash used to be in the T5 distribution. Reassured that the sensitivity is normal part of the shingles course and may last for a few months yet. Talked about soothing topicals but no promises on anything being helpful for this type of pain    Blood pressure still little borderline.   Has been under a lot of stress with his wife  Is taking a total of 25 mg HCTZ  Losartan 100  Amlodipine 10 (recently increased)  Monitoring consider spironolactone        ICD-10-CM ICD-9-CM    1. Hip arthritis  M16.10 716.95 diclofenac (VOLTAREN) 1 % gel   2. Balanitis  N48.1 607.1 ketoconazole (NIZORAL) 2 % topical cream   3. Essential hypertension, benign  I10 401.1    4. Tinea versicolor  B36.0 111.0        AVS given. Pt expressed understanding of instructions  This was a 40-minute visit. Greater than 50% of the time was spent counseling the patient on arthritis balanitis hypertension and face rash.

## 2021-07-27 DIAGNOSIS — I10 ESSENTIAL HYPERTENSION, BENIGN: ICD-10-CM

## 2021-07-27 RX ORDER — LOSARTAN POTASSIUM AND HYDROCHLOROTHIAZIDE 12.5; 1 MG/1; MG/1
TABLET ORAL
Qty: 90 TABLET | Refills: 0 | Status: SHIPPED | OUTPATIENT
Start: 2021-07-27 | End: 2021-10-20

## 2021-08-03 ENCOUNTER — TELEPHONE (OUTPATIENT)
Dept: FAMILY MEDICINE CLINIC | Age: 66
End: 2021-08-03

## 2021-08-03 NOTE — TELEPHONE ENCOUNTER
Tried contacting pt to schedule a nurse visit for A1C check. LM for pt to return call. If pt calls back please schedule a nurse visit only for an A1C check.

## 2021-08-03 NOTE — TELEPHONE ENCOUNTER
Pt called back,I let patient know he is needing to schedule an appt but patient is wanting to speak with the nurse because he said he gets his A1C checked by another doctor.     Please give patient a call back    If calling before 10 call [phone], if calling after 10 call [phone]

## 2021-08-03 NOTE — TELEPHONE ENCOUNTER
Pt states he was seen in May by his Endocrinologist Dr. Miguel Conway to have his A1C checked. Pt is scheduled to go back in 4 months to have another visit along with labs.

## 2021-08-15 DIAGNOSIS — E11.65 UNCONTROLLED TYPE 2 DIABETES MELLITUS WITH HYPERGLYCEMIA (HCC): ICD-10-CM

## 2021-08-15 RX ORDER — ATORVASTATIN CALCIUM 10 MG/1
TABLET, FILM COATED ORAL
Qty: 90 TABLET | Refills: 0 | Status: SHIPPED | OUTPATIENT
Start: 2021-08-15 | End: 2021-11-15

## 2021-09-20 ENCOUNTER — VIRTUAL VISIT (OUTPATIENT)
Dept: FAMILY MEDICINE CLINIC | Age: 66
End: 2021-09-20
Payer: MEDICARE

## 2021-09-20 DIAGNOSIS — I10 ESSENTIAL HYPERTENSION, BENIGN: Primary | ICD-10-CM

## 2021-09-20 DIAGNOSIS — R60.9 PERIPHERAL EDEMA: ICD-10-CM

## 2021-09-20 DIAGNOSIS — E11.65 UNCONTROLLED TYPE 2 DIABETES MELLITUS WITH HYPERGLYCEMIA (HCC): ICD-10-CM

## 2021-09-20 PROCEDURE — 99443 PR PHYS/QHP TELEPHONE EVALUATION 21-30 MIN: CPT | Performed by: FAMILY MEDICINE

## 2021-09-20 RX ORDER — FUROSEMIDE 20 MG/1
20 TABLET ORAL DAILY
Qty: 90 TABLET | Refills: 0 | Status: SHIPPED | OUTPATIENT
Start: 2021-09-20 | End: 2021-12-03 | Stop reason: SDUPTHER

## 2021-09-20 RX ORDER — CARVEDILOL 6.25 MG/1
6.25 TABLET ORAL 2 TIMES DAILY WITH MEALS
Qty: 60 TABLET | Refills: 2 | Status: SHIPPED | OUTPATIENT
Start: 2021-09-20 | End: 2021-12-17

## 2021-09-20 NOTE — PROGRESS NOTES
Chief Complaint   Patient presents with    Diabetes    Leg Swelling     in both legs, feet, and ankles      Pt was evaluated via phone call only. Pt reports that he has been having increased swelling in his legs and elevated BP readings. Pt is under increased stress due to his wife having a stroke and providing care for her. Dagmar Courtney is a 74 y.o. male, evaluated via audio-only technology on 9/20/2021 for Diabetes and Leg Swelling (in both legs, feet, and ankles )  . Assessment & Plan:   Diagnoses and all orders for this visit:    1. Essential hypertension, benign  -elevated at home, will add Coreg  -confirmed that pt is taking losartan-HCTZ, amlodipine and cardizem    2. Uncontrolled type 2 diabetes mellitus with hyperglycemia ([facility] [address]  -scheduled for follow up in 2 weeks for office visit    3. Peripheral edema  -add lasix  -stop low dose HCTZ    Other orders  -     carvediloL (COREG) 6.25 mg tablet; Take 1 Tablet by mouth two (2) times daily (with meals). -     furosemide (LASIX) 20 mg tablet; Take 1 Tablet by mouth daily. 12  Subjective:       Prior to Admission medications    Medication Sig Start Date End Date Taking? Authorizing Provider   carvediloL (COREG) 6.25 mg tablet Take 1 Tablet by mouth two (2) times daily (with meals). 9/20/21  Yes Mya Waggoner MD   furosemide (LASIX) 20 mg tablet Take 1 Tablet by mouth daily. 9/20/21  Yes Mya Waggoner MD   atorvastatin (LIPITOR) 10 mg tablet Take 1 tablet by mouth once daily 8/15/21  Yes Mya Waggoner MD   losartan-hydroCHLOROthiazide (HYZAAR) 100-12.5 mg per tablet Take 1 tablet by mouth once daily 7/27/21  Yes Mya Waggoner MD   ketoconazole (NIZORAL) 2 % topical cream Apply  to affected area daily. 7/14/21  Yes Maury Agarwal MD   diclofenac (VOLTAREN) 1 % gel Apply 2 g to affected area four (4) times daily.  7/14/21  Yes Maury Agarwal MD   amLODIPine (NORVASC) 10 mg tablet Take 1 Tablet by mouth daily. Delete the 5 mg tabs from profile 6/28/21  Yes Zev Daniels MD   hydroCHLOROthiazide (HYDRODIURIL) 12.5 mg tablet Take 1 tablet by mouth once daily 5/10/21  Yes Zev Daniels MD   metFORMIN ER (GLUCOPHAGE XR) 500 mg tablet TAKE 4 TABLETS BY MOUTH ONCE DAILY 5/9/21  Yes Zev Daniels MD   dilTIAZem ER (TIAZAC) 360 mg capsule TAKE 1 CAPSULE BY MOUTH ONCE DAILY (REPLACES  240  MG  DOSE) 2/24/21  Yes Zev Daniels MD   insulin NPH/insulin regular (NovoLIN 70/30 U-100 Insulin) 100 unit/mL (70-30) injection Inject 50 units before breakfast and before dinner --dispense relion brand--Dose change 11/17/20--updated med list--did not send prescription to the pharmacy 11/17/20  Yes Zev Daniels MD   cyanocobalamin, vitamin B-12, (VITAMIN B-12 PO) Take 5,000 mcg by mouth daily. Yes Provider, Historical   cholecalciferol (VITAMIN D3) (1000 Units /25 mcg) tablet Take  by mouth daily. Yes Provider, Historical   acetaminophen (TYLENOL ARTHRITIS PAIN) 650 mg TbER Take 650 mg by mouth every eight (8) hours. Yes Provider, Historical   aspirin 81 mg chewable tablet Take 81 mg by mouth daily. Yes Provider, Historical     Current Outpatient Medications   Medication Sig Dispense Refill    carvediloL (COREG) 6.25 mg tablet Take 1 Tablet by mouth two (2) times daily (with meals). 60 Tablet 2    furosemide (LASIX) 20 mg tablet Take 1 Tablet by mouth daily. 90 Tablet 0    atorvastatin (LIPITOR) 10 mg tablet Take 1 tablet by mouth once daily 90 Tablet 0    losartan-hydroCHLOROthiazide (HYZAAR) 100-12.5 mg per tablet Take 1 tablet by mouth once daily 90 Tablet 0    ketoconazole (NIZORAL) 2 % topical cream Apply  to affected area daily. 15 g 0    diclofenac (VOLTAREN) 1 % gel Apply 2 g to affected area four (4) times daily. 100 g 3    amLODIPine (NORVASC) 10 mg tablet Take 1 Tablet by mouth daily.  Delete the 5 mg tabs from profile 90 Tablet 3    hydroCHLOROthiazide (HYDRODIURIL) 12.5 mg tablet Take 1 tablet by mouth once daily 90 Tab 3    metFORMIN ER (GLUCOPHAGE XR) 500 mg tablet TAKE 4 TABLETS BY MOUTH ONCE DAILY 360 Tab 3    dilTIAZem ER (TIAZAC) 360 mg capsule TAKE 1 CAPSULE BY MOUTH ONCE DAILY (REPLACES  240  MG  DOSE) 90 Cap 3    insulin NPH/insulin regular (NovoLIN 70/30 U-100 Insulin) 100 unit/mL (70-30) injection Inject 50 units before breakfast and before dinner --dispense relion brand--Dose change 11/17/20--updated med list--did not send prescription to the pharmacy 7 Vial 3    cyanocobalamin, vitamin B-12, (VITAMIN B-12 PO) Take 5,000 mcg by mouth daily.  cholecalciferol (VITAMIN D3) (1000 Units /25 mcg) tablet Take  by mouth daily.  acetaminophen (TYLENOL ARTHRITIS PAIN) 650 mg TbER Take 650 mg by mouth every eight (8) hours.  aspirin 81 mg chewable tablet Take 81 mg by mouth daily. Allergies   Allergen Reactions    Jardiance [Empagliflozin] Other (comments)     Yeast infection       Review of Systems   Constitutional: Positive for malaise/fatigue. Negative for chills and fever. HENT: Negative for congestion and sore throat. Respiratory: Negative for cough and shortness of breath. Cardiovascular: Positive for leg swelling. Negative for chest pain and palpitations. Gastrointestinal: Negative for abdominal pain, heartburn, nausea and vomiting. Genitourinary: Negative for dysuria and urgency. Musculoskeletal: Negative for joint pain and myalgias. Neurological: Negative for dizziness, tingling and headaches. All other systems reviewed and are negative. Patient-Reported Vitals 9/20/2021   Patient-Reported Weight 220 lbs   Patient-Reported Systolic  902   Patient-Reported Diastolic 80       Dagmar Courtney, who was evaluated through a patient-initiated, synchronous (real-time) audio only encounter, and/or her healthcare decision maker, is aware that it is a billable service, with coverage as determined by his insurance carrier.  He provided verbal consent to proceed: Yes. He has not had a related appointment within my department in the past 7 days or scheduled within the next 24 hours. On this date 09/20/2021 I have spent 22 minutes reviewing previous notes, test results and face to face (virtual) with the patient discussing the diagnosis and importance of compliance with the treatment plan as well as documenting on the day of the visit.     David Harris MD

## 2021-09-20 NOTE — PROGRESS NOTES
Chief Complaint   Patient presents with    Diabetes    Leg Swelling     in both legs, feet, and ankles      Health Maintenance reviewed     Health Maintenance reviewed     1. Have you been to the ER, urgent care clinic since your last visit? Hospitalized since your last visit? No     2. Have you seen or consulted any other health care providers outside of the [address] [place] [address] since your last visit? Include any pap smears or colon screening.   No

## 2021-10-04 ENCOUNTER — OFFICE VISIT (OUTPATIENT)
Dept: FAMILY MEDICINE CLINIC | Age: 66
End: 2021-10-04
Payer: MEDICARE

## 2021-10-04 VITALS
RESPIRATION RATE: 18 BRPM | WEIGHT: 226 LBS | HEART RATE: 68 BPM | OXYGEN SATURATION: 97 % | HEIGHT: 71 IN | DIASTOLIC BLOOD PRESSURE: 67 MMHG | TEMPERATURE: 98 F | SYSTOLIC BLOOD PRESSURE: 135 MMHG | BODY MASS INDEX: 31.64 KG/M2

## 2021-10-04 DIAGNOSIS — R21 FACIAL RASH: ICD-10-CM

## 2021-10-04 DIAGNOSIS — Z23 ENCOUNTER FOR IMMUNIZATION: Primary | ICD-10-CM

## 2021-10-04 DIAGNOSIS — L81.9 HYPERPIGMENTED SKIN LESION: ICD-10-CM

## 2021-10-04 DIAGNOSIS — M25.552 LEFT HIP PAIN: ICD-10-CM

## 2021-10-04 DIAGNOSIS — I10 ESSENTIAL HYPERTENSION, BENIGN: ICD-10-CM

## 2021-10-04 DIAGNOSIS — R60.9 PERIPHERAL EDEMA: ICD-10-CM

## 2021-10-04 DIAGNOSIS — R60.0 LOCALIZED EDEMA: ICD-10-CM

## 2021-10-04 DIAGNOSIS — E11.65 UNCONTROLLED TYPE 2 DIABETES MELLITUS WITH HYPERGLYCEMIA (HCC): ICD-10-CM

## 2021-10-04 DIAGNOSIS — E55.9 VITAMIN D DEFICIENCY: ICD-10-CM

## 2021-10-04 LAB
ALBUMIN UR QL STRIP: 10 MG/L
CREATININE, URINE POC: 100 MG/DL
MICROALBUMIN/CREAT RATIO POC: <30 MG/G

## 2021-10-04 PROCEDURE — G8752 SYS BP LESS 140: HCPCS | Performed by: FAMILY MEDICINE

## 2021-10-04 PROCEDURE — 3046F HEMOGLOBIN A1C LEVEL >9.0%: CPT | Performed by: FAMILY MEDICINE

## 2021-10-04 PROCEDURE — 82044 UR ALBUMIN SEMIQUANTITATIVE: CPT | Performed by: FAMILY MEDICINE

## 2021-10-04 PROCEDURE — 90694 VACC AIIV4 NO PRSRV 0.5ML IM: CPT | Performed by: FAMILY MEDICINE

## 2021-10-04 PROCEDURE — 99214 OFFICE O/P EST MOD 30 MIN: CPT | Performed by: FAMILY MEDICINE

## 2021-10-04 PROCEDURE — G8432 DEP SCR NOT DOC, RNG: HCPCS | Performed by: FAMILY MEDICINE

## 2021-10-04 PROCEDURE — G8536 NO DOC ELDER MAL SCRN: HCPCS | Performed by: FAMILY MEDICINE

## 2021-10-04 PROCEDURE — 1101F PT FALLS ASSESS-DOCD LE1/YR: CPT | Performed by: FAMILY MEDICINE

## 2021-10-04 PROCEDURE — G0463 HOSPITAL OUTPT CLINIC VISIT: HCPCS | Performed by: FAMILY MEDICINE

## 2021-10-04 PROCEDURE — G8754 DIAS BP LESS 90: HCPCS | Performed by: FAMILY MEDICINE

## 2021-10-04 PROCEDURE — 2022F DILAT RTA XM EVC RTNOPTHY: CPT | Performed by: FAMILY MEDICINE

## 2021-10-04 PROCEDURE — 3017F COLORECTAL CA SCREEN DOC REV: CPT | Performed by: FAMILY MEDICINE

## 2021-10-04 PROCEDURE — G8427 DOCREV CUR MEDS BY ELIG CLIN: HCPCS | Performed by: FAMILY MEDICINE

## 2021-10-04 PROCEDURE — G8417 CALC BMI ABV UP PARAM F/U: HCPCS | Performed by: FAMILY MEDICINE

## 2021-10-04 NOTE — PATIENT INSTRUCTIONS
Vaccine Information Statement    Influenza (Flu) Vaccine (Inactivated or Recombinant): What You Need to Know    Many vaccine information statements are available in Azeri and other languages. See www.immunize.org/vis. Hojas de información sobre vacunas están disponibles en español y en muchos otros idiomas. Visite www.immunize.org/vis. 1. Why get vaccinated? Influenza vaccine can prevent influenza (flu). Flu is a contagious disease that spreads around the [facility] every year, usually between October and May. Anyone can get the flu, but it is more dangerous for some people. Infants and young children, people 72 years and older, pregnant people, and people with certain health conditions or a weakened immune system are at greatest risk of flu complications. Pneumonia, bronchitis, sinus infections, and ear infections are examples of flu-related complications. If you have a medical condition, such as heart disease, cancer, or diabetes, flu can make it worse. Flu can cause fever and chills, sore throat, muscle aches, fatigue, cough, headache, and runny or stuffy nose. Some people may have vomiting and diarrhea, though this is more common in children than adults. In an average year, thousands of people in the [facility] die from flu, and many more are hospitalized. Flu vaccine prevents millions of illnesses and flu-related visits to the doctor each year. 2. Influenza vaccines     CDC recommends everyone 6 months and older get vaccinated every flu season. Children 6 months through 6years of age may need 2 doses during a single flu season. Everyone else needs only 1 dose each flu season. It takes about 2 weeks for protection to develop after vaccination. There are many flu viruses, and they are always changing. Each year a new flu vaccine is made to protect against the influenza viruses believed to be likely to cause disease in the upcoming flu season.  Even when the vaccine doesnt exactly match these viruses, it may still provide some protection. Influenza vaccine does not cause flu. Influenza vaccine may be given at the same time as other vaccines. 3. Talk with your health care provider    Tell your vaccination provider if the person getting the vaccine:   Has had an allergic reaction after a previous dose of influenza vaccine, or has any severe, life-threatening allergies    Has ever had Guillain-Barré Syndrome (also called GBS)    In some cases, your health care provider may decide to postpone influenza vaccination until a future visit. Influenza vaccine can be administered at any time during pregnancy. People who are or will be pregnant during influenza season should receive inactivated influenza vaccine. People with minor illnesses, such as a cold, may be vaccinated. People who are moderately or severely ill should usually wait until they recover before getting influenza vaccine. Your health care provider can give you more information. 4. Risks of a vaccine reaction     Soreness, redness, and swelling where the shot is given, fever, muscle aches, and headache can happen after influenza vaccination.  There may be a very small increased risk of Guillain-Barré Syndrome (GBS) after inactivated influenza vaccine (the flu shot). [facility] children who get the flu shot along with pneumococcal vaccine (PCV13) and/or DTaP vaccine at the same time might be slightly more likely to have a seizure caused by fever. Tell your health care provider if a child who is getting flu vaccine has ever had a seizure. People sometimes faint after medical procedures, including vaccination. Tell your provider if you feel dizzy or have vision changes or ringing in the ears. As with any medicine, there is a very remote chance of a vaccine causing a severe allergic reaction, other serious injury, or death. 5. What if there is a serious problem?     An allergic reaction could occur after the vaccinated person leaves the clinic. If you see signs of a severe allergic reaction (hives, swelling of the face and throat, difficulty breathing, a fast heartbeat, dizziness, or weakness), call 9-1-1 and get the person to the nearest hospital.    For other signs that concern you, call your health care provider. Adverse reactions should be reported to the Vaccine Adverse Event Reporting System (VAERS). Your health care provider will usually file this report, or you can do it yourself. Visit the VAERS website at www.vaers. [facility].gov or call 0-385.997.8465. VAERS is only for reporting reactions, and VAERS staff members do not give medical advice. 6. The National Vaccine Injury Compensation Program    The [facility] Vaccine Injury Compensation Program (VICP) is a federal program that was created to compensate people who may have been injured by certain vaccines. Claims regarding alleged injury or death due to vaccination have a time limit for filing, which may be as short as two years. Visit the VICP website at www.[facility].gov/vaccinecompensation or call 9-705.645.8458 to learn about the program and about filing a claim. 7. How can I learn more?  Ask your health care provider.  Call your local or state health department.  Visit the website of the Food and Drug Administration (FDA) for vaccine package inserts and additional information at www.fda.gov/vaccines-blood-biologics/vaccines.  Contact the Centers for Disease Control and Prevention (CDC):  - Call 7-957.350.8212 (1-800-CDC-INFO) or  - Visit CDCs influenza website at www.cdc.gov/flu. Vaccine Information Statement   Inactivated Influenza Vaccine   8/6/2021  [facility] [id number]-[id number]   Department of Health and Human Services  Centers for Disease Control and Prevention    Office Use Only

## 2021-10-04 NOTE — PROGRESS NOTES
Becca Mark is a 74 y.o. male  Chief Complaint   Patient presents with    Follow-up     2 weeks     Health Maintenance Due   Topic Date Due    Hepatitis C Screening  Never done    Pneumococcal 65+ years (2 of 2 - PPSV23) 12/19/2019    Foot Exam Q1  02/10/2021    A1C test (Diabetic or Prediabetic)  08/03/2021    Flu Vaccine (1) 09/01/2021    Medicare Yearly Exam  10/16/2021     Visit Vitals  /67   Pulse 68   Temp 98 °F (36.7 °C) (Oral)   Resp 18   Ht 5' 11\" (1.803 m)   Wt 226 lb (102.5 kg)   SpO2 97%   BMI 31.52 kg/m²

## 2021-10-04 NOTE — PROGRESS NOTES
Chief Complaint   Patient presents with    Follow-up     2 weeks     Pt is here for several follow up concerns:     1. Left hip pain  2. Elevated blood pressure at home  3. Elevated blood sugar in the setting of uncontrolled diabetes. 4. Dark spots left from shingles last May  5. Pt also requests dermatology referral due to facial irritation. Pt was started on Lasix 2 weeks ago at last visit. Reports no problem in swelling. Subjective: (As above and below)     Chief Complaint   Patient presents with    Follow-up     2 weeks     he is a 74y.o. year old male who presents for evaluation. Reviewed PmHx, RxHx, FmHx, SocHx, AllgHx and updated in chart. Review of Systems - negative except as listed above    Objective:     Vitals:    10/04/21 1100   BP: 135/67   Pulse: 68   Resp: 18   Temp: 98 °F (36.7 °C)   TempSrc: Oral   SpO2: 97%   Weight: 226 lb (102.5 kg)   Height: 5' 11\" (1.803 m)     Physical Examination: General appearance - alert, well appearing, and in no distress  Mental status - normal mood, behavior, speech, dress, motor activity, and thought processes  Ears - bilateral TM's and external ear canals normal  Chest - clear to auscultation, no wheezes, rales or rhonchi, symmetric air entry  Heart - normal rate, regular rhythm, normal S1, S2, no murmurs, rubs, clicks or gallops  Musculoskeletal - no joint tenderness, deformity or swelling  Extremities -1+ pitting edema    Assessment/ Plan:    1. Encounter for immunization  - FLU (FLUAD QUAD INFLUENZA VACCINE,QUAD,ADJUVANTED)    2. Uncontrolled type 2 diabetes mellitus with hyperglycemia ([facility] [address]  -Very uncontrolled at last check, followed by endocrinology.  - METABOLIC PANEL, COMPREHENSIVE; Future  - CBC W/O DIFF; Future  - LIPID PANEL; Future  - HEMOGLOBIN A1C WITH EAG; Future  - AMB POC URINE, MICROALBUMIN, SEMIQUANT (3 RESULTS)  - HEMOGLOBIN A1C WITH EAG  - LIPID PANEL  - CBC W/O DIFF  - METABOLIC PANEL, COMPREHENSIVE    3.  Essential hypertension, benign  Well-controlled in office today, patient reports elevated readings at home. Continue on current medications, check labs  - TSH 3RD GENERATION; Future  - HEPATITIS C AB; Future  - HEPATITIS C AB  - TSH 3RD GENERATION    4. Vitamin D deficiency  - VITAMIN D, 25 HYDROXY; Future  - VITAMIN D, 25 HYDROXY    5. Left hip pain  -Patient has seen 3 separate providers for left hip and low back pain with [address] [place] [address], patient reports dissatisfaction with care and significant increase in pain with recent hip injection. Patient would like to seek a second opinion  - Becka Felton [address]    6. Hyperpigmented skin lesion  Referral for dermatology evaluation of hyperpigmented lesions, both in the shingles distribution following recent illness as well as and several other areas that have been present for a long period of time  - REFERRAL TO DERMATOLOGY    7. Facial rash  -Refer to dermatology    8. Peripheral edema  We will increase fluid pill once labs are reviewed  - NT-PRO BNP; Future  - NT-PRO BNP    9. Localized edema   - NT-PRO BNP; Future  - NT-PRO BNP    I have discussed the diagnosis with the patient and the intended plan as seen in the above orders. The patient has received an after-visit summary and questions were answered concerning future plans.      Medication Side Effects and Warnings were discussed with patient: yes  Patient Labs were reviewed: yes  Patient Past Records were reviewed:  yes    Inez Moore M.D.

## 2021-10-05 LAB
25(OH)D3 SERPL-MCNC: 50.1 NG/ML (ref 30–100)
ALBUMIN SERPL-MCNC: 4.2 G/DL (ref 3.5–5)
ALBUMIN/GLOB SERPL: 1 {RATIO} (ref 1.1–2.2)
ALP SERPL-CCNC: 107 U/L (ref 45–117)
ALT SERPL-CCNC: 20 U/L (ref 12–78)
ANION GAP SERPL CALC-SCNC: 4 MMOL/L (ref 5–15)
AST SERPL-CCNC: 14 U/L (ref 15–37)
BILIRUB SERPL-MCNC: 0.5 MG/DL (ref 0.2–1)
BNP SERPL-MCNC: 33 PG/ML
BUN SERPL-MCNC: 16 MG/DL (ref 6–20)
BUN/CREAT SERPL: 17 (ref 12–20)
CALCIUM SERPL-MCNC: 10 MG/DL (ref 8.5–10.1)
CHLORIDE SERPL-SCNC: 102 MMOL/L (ref 97–108)
CHOLEST SERPL-MCNC: 125 MG/DL
CO2 SERPL-SCNC: 29 MMOL/L (ref 21–32)
CREAT SERPL-MCNC: 0.94 MG/DL (ref 0.7–1.3)
ERYTHROCYTE [DISTWIDTH] IN BLOOD BY AUTOMATED COUNT: 12.9 % (ref 11.5–14.5)
EST. AVERAGE GLUCOSE BLD GHB EST-MCNC: 278 MG/DL
GLOBULIN SER CALC-MCNC: 4.4 G/DL (ref 2–4)
GLUCOSE SERPL-MCNC: 103 MG/DL (ref 65–100)
HBA1C MFR BLD: 11.3 % (ref 4–5.6)
HCT VFR BLD AUTO: 44.1 % (ref 36.6–50.3)
HCV AB SERPL QL IA: NONREACTIVE
HDLC SERPL-MCNC: 56 MG/DL
HDLC SERPL: 2.2 {RATIO} (ref 0–5)
HGB BLD-MCNC: 14 G/DL (ref 12.1–17)
LDLC SERPL CALC-MCNC: 48 MG/DL (ref 0–100)
MCH RBC QN AUTO: 30.7 PG (ref 26–34)
MCHC RBC AUTO-ENTMCNC: 31.7 G/DL (ref 30–36.5)
MCV RBC AUTO: 96.7 FL (ref 80–99)
NRBC # BLD: 0 K/UL (ref 0–0.01)
NRBC BLD-RTO: 0 PER 100 WBC
PLATELET # BLD AUTO: 338 K/UL (ref 150–400)
PMV BLD AUTO: 11.8 FL (ref 8.9–12.9)
POTASSIUM SERPL-SCNC: 4.4 MMOL/L (ref 3.5–5.1)
PROT SERPL-MCNC: 8.6 G/DL (ref 6.4–8.2)
RBC # BLD AUTO: 4.56 M/UL (ref 4.1–5.7)
SODIUM SERPL-SCNC: 135 MMOL/L (ref 136–145)
TRIGL SERPL-MCNC: 105 MG/DL (ref ?–150)
TSH SERPL DL<=0.05 MIU/L-ACNC: 1.53 UIU/ML (ref 0.36–3.74)
VLDLC SERPL CALC-MCNC: 21 MG/DL
WBC # BLD AUTO: 7.2 K/UL (ref 4.1–11.1)

## 2021-10-12 NOTE — PROGRESS NOTES
verified. Results/ recommendations reviewed. Pt also requests an XR of left hip r/t > 3 months of pain.   He would like for the order to be faxed to [facility].

## 2021-10-12 NOTE — PROGRESS NOTES
Diabetes remains very poorly controlled. Please confirm that pt is taking insulin as written. Please follow up with endo as scheduled.

## 2021-10-13 DIAGNOSIS — M25.552 LEFT HIP PAIN: Primary | ICD-10-CM

## 2021-10-25 ENCOUNTER — TELEPHONE (OUTPATIENT)
Dept: FAMILY MEDICINE CLINIC | Age: 66
End: 2021-10-25

## 2021-10-25 NOTE — TELEPHONE ENCOUNTER
Please advise pt of x-ray report as follows:    Significant degeneration of the left hip. No acute fracture  Recommend follow up with ortho.      Please inform

## 2021-10-28 ENCOUNTER — TELEPHONE (OUTPATIENT)
Dept: FAMILY MEDICINE CLINIC | Age: 66
End: 2021-10-28

## 2021-11-15 DIAGNOSIS — E11.65 UNCONTROLLED TYPE 2 DIABETES MELLITUS WITH HYPERGLYCEMIA (HCC): ICD-10-CM

## 2021-11-15 RX ORDER — ATORVASTATIN CALCIUM 10 MG/1
TABLET, FILM COATED ORAL
Qty: 90 TABLET | Refills: 0 | Status: SHIPPED | OUTPATIENT
Start: 2021-11-15 | End: 2022-02-10

## 2021-11-17 LAB
ALBUMIN SERPL-MCNC: 4.4 G/DL (ref 3.8–4.8)
ALBUMIN/CREAT UR: 20 MG/G CREAT (ref 0–29)
ALBUMIN/GLOB SERPL: 1.3 {RATIO} (ref 1.2–2.2)
ALP SERPL-CCNC: 102 IU/L (ref 44–121)
ALT SERPL-CCNC: 14 IU/L (ref 0–44)
AST SERPL-CCNC: 11 IU/L (ref 0–40)
BILIRUB SERPL-MCNC: 0.3 MG/DL (ref 0–1.2)
BUN SERPL-MCNC: 13 MG/DL (ref 8–27)
BUN/CREAT SERPL: 14 (ref 10–24)
CALCIUM SERPL-MCNC: 9.7 MG/DL (ref 8.6–10.2)
CHLORIDE SERPL-SCNC: 99 MMOL/L (ref 96–106)
CHOLEST SERPL-MCNC: 117 MG/DL (ref 100–199)
CO2 SERPL-SCNC: 27 MMOL/L (ref 20–29)
CREAT SERPL-MCNC: 0.94 MG/DL (ref 0.76–1.27)
CREAT UR-MCNC: 137.2 MG/DL
EST. AVERAGE GLUCOSE BLD GHB EST-MCNC: 306 MG/DL
GLOBULIN SER CALC-MCNC: 3.4 G/DL (ref 1.5–4.5)
GLUCOSE SERPL-MCNC: 217 MG/DL (ref 65–99)
HBA1C MFR BLD: 12.3 % (ref 4.8–5.6)
HDLC SERPL-MCNC: 52 MG/DL
IMP & REVIEW OF LAB RESULTS: NORMAL
LDLC SERPL CALC-MCNC: 53 MG/DL (ref 0–99)
MICROALBUMIN UR-MCNC: 27.2 UG/ML
POTASSIUM SERPL-SCNC: 4.8 MMOL/L (ref 3.5–5.2)
PROT SERPL-MCNC: 7.8 G/DL (ref 6–8.5)
SODIUM SERPL-SCNC: 138 MMOL/L (ref 134–144)
TRIGL SERPL-MCNC: 54 MG/DL (ref 0–149)
VLDLC SERPL CALC-MCNC: 12 MG/DL (ref 5–40)

## 2021-11-19 DIAGNOSIS — E55.9 VITAMIN D DEFICIENCY: ICD-10-CM

## 2021-11-19 DIAGNOSIS — I10 ESSENTIAL HYPERTENSION, BENIGN: ICD-10-CM

## 2021-11-19 DIAGNOSIS — E11.65 UNCONTROLLED TYPE 2 DIABETES MELLITUS WITH HYPERGLYCEMIA (HCC): ICD-10-CM

## 2021-11-19 DIAGNOSIS — E78.5 HYPERLIPIDEMIA LDL GOAL <100: ICD-10-CM

## 2021-12-03 ENCOUNTER — VIRTUAL VISIT (OUTPATIENT)
Dept: ENDOCRINOLOGY | Age: 66
End: 2021-12-03
Payer: MEDICARE

## 2021-12-03 DIAGNOSIS — E11.65 UNCONTROLLED TYPE 2 DIABETES MELLITUS WITH HYPERGLYCEMIA (HCC): Primary | ICD-10-CM

## 2021-12-03 DIAGNOSIS — E78.5 HYPERLIPIDEMIA LDL GOAL <100: ICD-10-CM

## 2021-12-03 DIAGNOSIS — E55.9 VITAMIN D DEFICIENCY: ICD-10-CM

## 2021-12-03 DIAGNOSIS — I10 ESSENTIAL HYPERTENSION, BENIGN: ICD-10-CM

## 2021-12-03 PROCEDURE — 99443 PR PHYS/QHP TELEPHONE EVALUATION 21-30 MIN: CPT | Performed by: INTERNAL MEDICINE

## 2021-12-03 RX ORDER — FUROSEMIDE 20 MG/1
20 TABLET ORAL DAILY
Qty: 90 TABLET | Refills: 3 | Status: SHIPPED | OUTPATIENT
Start: 2021-12-03 | End: 2022-06-06 | Stop reason: SDUPTHER

## 2021-12-03 NOTE — PROGRESS NOTES
Chief Complaint   Patient presents with   [employer]     [phone] phone call    Other     pcp and pharmacy confirmed       **THIS IS A VIRTUAL VISIT VIA A TELEPHONE ENCOUNTER. PATIENT AGREED TO HAVE THEIR CARE DELIVERED OVER THE PHONE IN PLACE OF THEIR REGULARLY SCHEDULED OFFICE VISIT**    History of Present Illness: José García is a 74 y.o. male here for follow up of diabetes. Has still been under a lot of stress caring for his wife after her stroke in Jan 2021 and she is still walking with a walker but he needs to help bathe and dress her everyday while also caring for everything around the house like bills and cleaning. Her BP has been under better control on the higher dose of amlodipine. Has not checked his sugars at all due to the pain in his fingertips. He would like to try and qualify for the [place] and is willing to dose his insulin 3-4 times a day to get better control. He has been walking with a cane as he has arthritis in his hip and knee and will need joint replacements but can't do this due to having to care for his wife. Dr. Rodney Cullen started him on lasix 20 mg daily in 9/21 and wanted him to stop the hctz 12.5 mg tab and thinks he may have stopped this for a little while but over the past 1-2 weeks he forgot about this instruction when he saw his bottle and has been taking this so I advised him to stop this again and monitor his BP off the hctz. Compliant with atorvastatin.     he has the following indications to begin treatment with Freestyle Emerson:  1) he has type 2 diabetes (E11.65) and will be changed to an intensive insulin regimen with 3 injections per day  2) he currently does not test his blood sugar due to severe neuropathy in his hands but will make treatment decisions off his freestyle emerson sensor readings  3) he will require frequent adjustments to his insulin injection doses based on his freestyle emerson sensor readings  4) he will benefit from therapeutic continuous glucose monitoring and I recommend that he begin this  5) he is seen in my office every 6 months          Current Outpatient Medications   Medication Sig    atorvastatin (LIPITOR) 10 mg tablet Take 1 tablet by mouth once daily    losartan-hydroCHLOROthiazide (HYZAAR) 100-12.5 mg per tablet Take 1 tablet by mouth once daily    multivitamin, tx-iron-ca-min (THERA-M w/ IRON) 9 mg iron-400 mcg tab tablet Take 1 Tablet by mouth daily.  carvediloL (COREG) 6.25 mg tablet Take 1 Tablet by mouth two (2) times daily (with meals).  furosemide (LASIX) 20 mg tablet Take 1 Tablet by mouth daily.  amLODIPine (NORVASC) 10 mg tablet Take 1 Tablet by mouth daily. Delete the 5 mg tabs from profile    hydroCHLOROthiazide (HYDRODIURIL) 12.5 mg tablet Take 1 tablet by mouth once daily    metFORMIN ER (GLUCOPHAGE XR) 500 mg tablet TAKE 4 TABLETS BY MOUTH ONCE DAILY    dilTIAZem ER (TIAZAC) 360 mg capsule TAKE 1 CAPSULE BY MOUTH ONCE DAILY (REPLACES  240  MG  DOSE)    insulin NPH/insulin regular (NovoLIN 70/30 U-100 Insulin) 100 unit/mL (70-30) injection Inject 50 units before breakfast and before dinner --dispense relion brand--Dose change 11/17/20--updated med list--did not send prescription to the pharmacy    cyanocobalamin, vitamin B-12, (VITAMIN B-12 PO) Take 5,000 mcg by mouth daily.  cholecalciferol (VITAMIN D3) (1000 Units /25 mcg) tablet Take  by mouth daily.  acetaminophen (TYLENOL ARTHRITIS PAIN) 650 mg TbER Take 650 mg by mouth every eight (8) hours.  aspirin 81 mg chewable tablet Take 81 mg by mouth daily. No current facility-administered medications for this visit.      Allergies   Allergen Reactions    Jardiance [Empagliflozin] Other (comments)     Yeast infection     Review of Systems: PER HPI    Physical Examination: NOT PERFORMED DUE TO THIS BEING A TELEPHONE CALL      Data Reviewed:   Component      Latest Ref Rng & Units 11/16/2021 10/4/2021   Sodium      134 - 144 mmol/L 138 135 (L)   Potassium 3.5 - 5.2 mmol/L 4.8 4.4   Chloride      96 - 106 mmol/L 99 102   CO2      20 - 29 mmol/L 27 29   Anion gap      5 - 15 mmol/L  4 (L)   Glucose      65 - 99 mg/dL 217 (H) 103 (H)   BUN      8 - 27 mg/dL 13 16   Creatinine      0.76 - 1.27 mg/dL 0.94 0.94   BUN/Creatinine ratio      10 - 24 14 17   GFR est AA      >59 mL/min/1.73 96 >60   GFR est non-AA      >59 mL/min/1.73 83 >60   Calcium      8.6 - 10.2 mg/dL 9.7 10.0   Bilirubin, total      0.0 - 1.2 mg/dL 0.3 0.5   ALT      0 - 44 IU/L 14 20   AST      0 - 40 IU/L 11 14 (L)   Alk. phosphatase      44 - 121 IU/L 102 107   Protein, total      6.0 - 8.5 g/dL 7.8 8.6 (H)   Albumin      3.8 - 4.8 g/dL 4.4 4.2   Globulin      2.0 - 4.0 g/dL  4.4 (H)   A-G Ratio      1.2 - 2.2 1.3 1.0 (L)   GLOBULIN, TOTAL      1.5 - 4.5 g/dL 3.4    Cholesterol, total      100 - 199 mg/dL 117 125   Triglyceride      0 - 149 mg/dL 54 105   HDL Cholesterol      >39 mg/dL 52 56   LDL, calculated      0 - 99 mg/dL 53 48   VLDL, calculated      5 - 40 mg/dL 12 21   CHOL/HDL Ratio      0.0 - 5.0    2.2   ALBUMIN, URINE POC      Negative mg/L  10   CREATININE, URINE POC      mg/dL  100   Microalbumin/creat ratio (POC)      <30 MG/G  <30   Creatinine, urine      Not Estab. mg/dL 137.2    Microalbumin, urine      Not Estab. ug/mL 27.2    Microalbumin/Creat. Ratio      0 - 29 mg/g creat 20    Hemoglobin A1c, (calculated)      4.8 - 5.6 % 12.3 (H) 11.3 (H)   Est. average glucose      mg/dL  278   Estimated average glucose      mg/dL 306    Vitamin D 25-Hydroxy      30 - 100 ng/mL  50.1   TSH      0.36 - 3.74 uIU/mL  1.53       Assessment/Plan:     1.  Type II or unspecified type diabetes mellitus without mention of complication, uncontrolled ([facility] [address]): his most recent Hgb A1c was 12.3% in 11/21 up from 11.3% in 10/21 down from 11.5% in 5/21 up from 11% in 11/20 up from 10.2% in 6/20 down from 11% in 2/20 down from 12% in 6/19 up from 9.4% in 10/18 up from 9.2% in 2/18 down from 9.9% in 9/17 up from 9.4% in 3/17 down from 10.6% in 10/16 up from 9.9% in 4/16 down from 11.4% in 1/16 up from 10.2% in 9/15 up from 10% in 1/15. Currently not checking at all so will benefit from changing to an intensive insulin regimen with 3 shots per day and using freestyle marcus to help him gain better control.  - change 70/30 insulin to 40 units before breakfast, 20 units before lunch and 50 units before dinner + 5 units for every 50 mg/dl above 150 mg/dl  - cont metformin  mg tabs 2 tabs bid  - check bs 3 times daily   - foot exam done 2/20  - optho UTD 11/19  - microalbumin nl 11/21  - check A1c and bmp prior to next visit  - check Hgb A1c, cmp, and microalbumin in 11/22        2. Unspecified essential hypertension: his BP was at goal < 140/90 at PCP's office in 10/21  - cont amlodipine 5 mg daily  - cont diltiazem 360 mg daily  - cont losartan/hct 100/12.5 mg daily  - stop hctz 12.5 mg daily  - cont lasix 20 mg daily  - monitor home blood pressure readings        3. Unspecified vitamin D deficiency: Level was 28.1 in 9/15 and with taking 1000 units daily up to 37 in 1/16. Down to 29 in 10/16 so increased to 2000 units daily and up to 41 in 3/17 and in 2/18. Down to 33.9 in 6/19 and 51 in 6/20 and 54 in 5/21  - cont vitamin D 2000 units daily  - check Vitamin D 25-OH level prior to next visit          4. Hyperlipidemia: Given DM, Goal LDL < 100, non-HDL < 130, and TG < 150. LDL 79 in 1/15 and 68 in 9/15 and 70 in 4/16 and 94 in 3/17 not on meds. Up to 100 in 2/18 and 103 in 10/18 and started on lipitor 10 mg in summer 2019 and down to 59 in 2/20 and in 11/20 and 53 in 11/21  - cont lipitor 10 mg daily  - check lipids in 11/22      Patient Instructions   1) Your Hemoglobin A1c (3 month test of blood sugar) is very high at 12.3%. 2) Please call [facility] to see if you can get the freestyle marcus. Call [phone] and ask them to fax me a form for your supplies and I'll complete this.     3) Dose your 70/30 insulin 40 units before breakfast, 20 units before lunch and 50 units before dinner and adjust your dose based on the scale below:    Blood sugar  Insulin dose          Less than 90  Eat first, take 5 units less       Normal dose as above    151-200  Add 5 units      201-250  Add 10 units     251-300  Add 15 units      301-350  Add 20 units      351-400  Add 25 units  401-450  Add 30 units  451-500  Add 35 units  Over 500  Add 40 units    4) Try stopping the hydrochlorothiazide (hctz) 12.5 mg tab and just take the furosemide 20 mg tab once daily for fluid. Still take losartan/hctz 100/12.5 mg once daily, amlodipine 10 mg daily, and diltiazem 360 mg daily. 5) Start monitoring blood pressure about 2-3 times per week at alternating times either in the morning or evening after resting for 5 minutes and sitting upright in a chair with your arm at heart level. Please let me know if you are having readings over 140 on the top number or 90 on the bottom number and if so, you should go back on the hctz 12.5 mg tab. 6) Your liver, kidney, urine, and cholesterol are all normal.    7) Please come for a follow up visit on 6/6/22 at 1:30pm in our [place] office. 8) Take the enclosed lab slip to repeat your labs prior to your next visit. Follow-up and Dispositions    · Return 6/6/22 at 1:30pm.             I spent a total of 30 minutes on the day of this virtual visit with a telephone encounter. All questions were answered and a copy of the instructions were sent to him via a letter.       Copy sent to:  Yvette Waggoner MD

## 2021-12-03 NOTE — LETTER
12/3/2021    Mr. Samantha Dodge  [address] [address]      Dear Samantha Dodge:    Please find your most recent results below. Resulted Orders   METABOLIC PANEL, COMPREHENSIVE   Result Value Ref Range    Glucose 217 (H) 65 - 99 mg/dL    BUN 13 8 - 27 mg/dL    Creatinine 0.94 0.76 - 1.27 mg/dL    GFR est non-AA 83 >59 mL/min/1.73    GFR est AA 96 >59 mL/min/1.73    BUN/Creatinine ratio 14 10 - 24    Sodium 138 134 - 144 mmol/L    Potassium 4.8 3.5 - 5.2 mmol/L    Chloride 99 96 - 106 mmol/L    CO2 27 20 - 29 mmol/L    Calcium 9.7 8.6 - 10.2 mg/dL    Protein, total 7.8 6.0 - 8.5 g/dL    Albumin 4.4 3.8 - 4.8 g/dL    GLOBULIN, TOTAL 3.4 1.5 - 4.5 g/dL    A-G Ratio 1.3 1.2 - 2.2    Bilirubin, total 0.3 0.0 - 1.2 mg/dL    Alk.  phosphatase 102 44 - 121 IU/L    AST (SGOT) 11 0 - 40 IU/L    ALT (SGPT) 14 0 - 44 IU/L    Narrative    Performed at:  [address]  [id number]  : Nimesh Wu MD, Phone:  [phone]   LIPID PANEL   Result Value Ref Range    Cholesterol, total 117 100 - 199 mg/dL    Triglyceride 54 0 - 149 mg/dL    HDL Cholesterol 52 >39 mg/dL    VLDL, calculated 12 5 - 40 mg/dL    LDL, calculated 53 0 - 99 mg/dL    Narrative    Performed at:  [address]  [id number]  : Nimesh Wu MD, Phone:  [phone]   MICROALBUMIN, UR, RAND W/ MICROALB/CREAT RATIO   Result Value Ref Range    Creatinine, urine 137.2 Not Estab. mg/dL    Microalbumin, urine 27.2 Not Estab. ug/mL    Microalb/Creat ratio (ug/mg creat.) 20 0 - 29 mg/g creat    Narrative    Performed at:  [address]  [id number]  : Nimesh Wu MD, Phone:  [phone]   HEMOGLOBIN A1C WITH EAG   Result Value Ref Range    Hemoglobin A1c 12.3 (H) 4.8 - 5.6 %    Estimated average glucose 306 mg/dL    Narrative    Performed at:  [address] [facility] [facility], [place], [place]  [id number]  : Artemio Flowers MD, Phone:  [phone]   CVD REPORT   Result Value Ref Range    INTERPRETATION Note     Narrative    Performed at:  [facility]  [address]  [id number]  : Zahraa Castañeda MD, Phone:  [phone]       1) Your Hemoglobin A1c (3 month test of blood sugar) is very high at 12.3%. 2) Please call [facility] to see if you can get the freestyle marcus. Call [phone] and ask them to fax me a form for your supplies and I'll complete this. 3) Dose your 70/30 insulin 40 units before breakfast, 20 units before lunch and 50 units before dinner and adjust your dose based on the scale below:    Blood sugar  Insulin dose          Less than 90  Eat first, take 5 units less       Normal dose as above    151-200  Add 5 units      201-250  Add 10 units     251-300  Add 15 units      301-350  Add 20 units      351-400  Add 25 units  401-450  Add 30 units  451-500  Add 35 units  Over 500  Add 40 units    4) Try stopping the hydrochlorothiazide (hctz) 12.5 mg tab and just take the furosemide 20 mg tab once daily for fluid. Still take losartan/hctz 100/12.5 mg once daily, amlodipine 10 mg daily, and diltiazem 360 mg daily. 5) Start monitoring blood pressure about 2-3 times per week at alternating times either in the morning or evening after resting for 5 minutes and sitting upright in a chair with your arm at heart level. Please let me know if you are having readings over 140 on the top number or 90 on the bottom number and if so, you should go back on the hctz 12.5 mg tab. 6) Your liver, kidney, urine, and cholesterol are all normal.    7) Please come for a follow up visit on 6/6/22 at 1:30pm in our [place] office. 8) Take the enclosed lab slip to repeat your labs prior to your next visit.     Please call me if you have any questions: [phone]    Sincerely,      Grzegorz Lim MD

## 2021-12-03 NOTE — PATIENT INSTRUCTIONS
1) Your Hemoglobin A1c (3 month test of blood sugar) is very high at 12.3%. 2) Please call [facility] to see if you can get the freestyle marcus. Call [phone] and ask them to fax me a form for your supplies and I'll complete this. 3) Dose your 70/30 insulin 40 units before breakfast, 20 units before lunch and 50 units before dinner and adjust your dose based on the scale below:    Blood sugar  Insulin dose          Less than 90  Eat first, take 5 units less       Normal dose as above    151-200  Add 5 units      201-250  Add 10 units     251-300  Add 15 units      301-350  Add 20 units      351-400  Add 25 units  401-450  Add 30 units  451-500  Add 35 units  Over 500  Add 40 units    4) Try stopping the hydrochlorothiazide (hctz) 12.5 mg tab and just take the furosemide 20 mg tab once daily for fluid. Still take losartan/hctz 100/12.5 mg once daily, amlodipine 10 mg daily, and diltiazem 360 mg daily. 5) Start monitoring blood pressure about 2-3 times per week at alternating times either in the morning or evening after resting for 5 minutes and sitting upright in a chair with your arm at heart level. Please let me know if you are having readings over 140 on the top number or 90 on the bottom number and if so, you should go back on the hctz 12.5 mg tab. 6) Your liver, kidney, urine, and cholesterol are all normal.    7) Please come for a follow up visit on 6/6/22 at 1:30pm in our [place] office. 8) Take the enclosed lab slip to repeat your labs prior to your next visit.

## 2021-12-15 ENCOUNTER — TELEPHONE (OUTPATIENT)
Dept: FAMILY MEDICINE CLINIC | Age: 66
End: 2021-12-15

## 2021-12-15 NOTE — TELEPHONE ENCOUNTER
Pt states that he is concerned about these two medications      furosemide (LASIX) 20 mg tablet    losartan-hydroCHLOROthiazide (HYZAAR) 100-12.5 mg per tablet     Please call pt back      Thank You

## 2022-01-12 ENCOUNTER — TELEPHONE (OUTPATIENT)
Dept: ENDOCRINOLOGY | Age: 67
End: 2022-01-12

## 2022-01-12 NOTE — TELEPHONE ENCOUNTER
Patient stated that he would like the Novolin pens sent to his pharmacy and if it does not go through he will continue to use the vials.

## 2022-01-12 NOTE — TELEPHONE ENCOUNTER
1/12/2022  12:54 PM    Patient called to let dr Joseline Daniels know that he is now ready for him to call in his prescription for novolin pens. He was waiting for his new insurance to kick in and dr. Ronaldo Pack told him to call when ready.  He wants a call back from nurse to [phone] thanks

## 2022-01-13 RX ORDER — PEN NEEDLE, DIABETIC 31 GX3/16"
NEEDLE, DISPOSABLE MISCELLANEOUS
Qty: 100 PEN NEEDLE | Refills: 11 | Status: SHIPPED | OUTPATIENT
Start: 2022-01-13

## 2022-01-13 RX ORDER — HUMAN INSULIN 100 [IU]/ML
INJECTION, SUSPENSION SUBCUTANEOUS
Qty: 45 ML | Refills: 11 | Status: SHIPPED | OUTPATIENT
Start: 2022-01-13 | End: 2022-06-06

## 2022-01-13 NOTE — TELEPHONE ENCOUNTER
You can let him know I sent the pens to his [facility] along with pen needles to see if they will go through. Did he ever call [facility] to see if he can get the freestyle marcus as I don't recall getting any forms for them? If not, please have him call [facility] at [phone] and ask them to fax me a form if he is still interested in this.

## 2022-01-18 NOTE — TELEPHONE ENCOUNTER
Pt notified of message per Dr. Lacey Martinez and voiced understanding of what was read to him. Forms from [facility] were received today.

## 2022-02-01 ENCOUNTER — TELEPHONE (OUTPATIENT)
Dept: ENDOCRINOLOGY | Age: 67
End: 2022-02-01

## 2022-02-01 NOTE — TELEPHONE ENCOUNTER
Patient stated that since he started using the Insulin pen his sugar has been up and down. He stated that the lowest it has ranged from the low 60's ( fasting) to 270's. He could not give me any readings because he does not know how to retrieve them off of his meter and he does not write them down. He stated that he normally takes his insulin as directed but today he skipped it because his sugar was in the 170's.  Patient would like to speak with Dr. Betina Haile

## 2022-02-01 NOTE — TELEPHONE ENCOUNTER
Pt called and LVM 2/1 @ 9:57. He would like to talk to the nurse or dr about his glucose numbers not being consistent, increasing and decreasing, making him feel sick. Pt# [phone].

## 2022-02-01 NOTE — TELEPHONE ENCOUNTER
Called and spoke with pt. He has been using the freestyle marcus and scanning 5-7 times per day. He has been following the scale I gave him but he has been dosing his insulin after he eats based on the reading he gets. I told him that by doing this, he is likely to have a spike after he eats and then will cause a low several hours later. I advised him to check his sugar 15-20 min before he eats his meal and then dose the 70/30 insulin based on this reading and then check again 2 hours after eating. As long as his sugars are staying in the  range, then I am comfortable with this. If he is going over 200 multiple times a week or under 90 more than 2 times in a week over the next week, then he should call me back. he voiced understanding of this plan.

## 2022-02-10 DIAGNOSIS — E11.65 UNCONTROLLED TYPE 2 DIABETES MELLITUS WITH HYPERGLYCEMIA (HCC): ICD-10-CM

## 2022-02-10 RX ORDER — ATORVASTATIN CALCIUM 10 MG/1
TABLET, FILM COATED ORAL
Qty: 90 TABLET | Refills: 0 | Status: SHIPPED | OUTPATIENT
Start: 2022-02-10 | End: 2022-05-16

## 2022-02-23 RX ORDER — DILTIAZEM HYDROCHLORIDE 360 MG/1
CAPSULE, EXTENDED RELEASE ORAL
Qty: 90 CAPSULE | Refills: 3 | Status: SHIPPED | OUTPATIENT
Start: 2022-02-23

## 2022-03-19 PROBLEM — G56.20 CUBITAL TUNNEL SYNDROME: Status: ACTIVE | Noted: 2019-03-27

## 2022-05-16 DIAGNOSIS — E11.65 UNCONTROLLED TYPE 2 DIABETES MELLITUS WITH HYPERGLYCEMIA (HCC): ICD-10-CM

## 2022-05-16 RX ORDER — ATORVASTATIN CALCIUM 10 MG/1
TABLET, FILM COATED ORAL
Qty: 90 TABLET | Refills: 0 | Status: SHIPPED | OUTPATIENT
Start: 2022-05-16 | End: 2022-05-17

## 2022-05-17 DIAGNOSIS — E11.65 UNCONTROLLED TYPE 2 DIABETES MELLITUS WITH HYPERGLYCEMIA (HCC): ICD-10-CM

## 2022-05-17 RX ORDER — ATORVASTATIN CALCIUM 10 MG/1
TABLET, FILM COATED ORAL
Qty: 90 TABLET | Refills: 0 | Status: SHIPPED | OUTPATIENT
Start: 2022-05-17

## 2022-05-23 DIAGNOSIS — E55.9 VITAMIN D DEFICIENCY: ICD-10-CM

## 2022-05-23 DIAGNOSIS — E78.5 HYPERLIPIDEMIA LDL GOAL <100: ICD-10-CM

## 2022-05-23 DIAGNOSIS — I10 ESSENTIAL HYPERTENSION, BENIGN: ICD-10-CM

## 2022-05-23 DIAGNOSIS — E11.65 UNCONTROLLED TYPE 2 DIABETES MELLITUS WITH HYPERGLYCEMIA (HCC): ICD-10-CM

## 2022-05-25 RX ORDER — METFORMIN HYDROCHLORIDE 500 MG/1
TABLET, EXTENDED RELEASE ORAL
Qty: 360 TABLET | Refills: 3 | Status: SHIPPED | OUTPATIENT
Start: 2022-05-25

## 2022-06-02 LAB
25(OH)D3+25(OH)D2 SERPL-MCNC: 50.4 NG/ML (ref 30–100)
BUN SERPL-MCNC: 10 MG/DL (ref 8–27)
BUN/CREAT SERPL: 12 (ref 10–24)
CALCIUM SERPL-MCNC: 9.3 MG/DL (ref 8.6–10.2)
CHLORIDE SERPL-SCNC: 101 MMOL/L (ref 96–106)
CO2 SERPL-SCNC: 24 MMOL/L (ref 20–29)
CREAT SERPL-MCNC: 0.86 MG/DL (ref 0.76–1.27)
EGFR: 94 ML/MIN/1.73
EST. AVERAGE GLUCOSE BLD GHB EST-MCNC: 209 MG/DL
GLUCOSE SERPL-MCNC: 109 MG/DL (ref 65–99)
HBA1C MFR BLD: 8.9 % (ref 4.8–5.6)
POTASSIUM SERPL-SCNC: 4.4 MMOL/L (ref 3.5–5.2)
SODIUM SERPL-SCNC: 140 MMOL/L (ref 134–144)

## 2022-06-06 ENCOUNTER — OFFICE VISIT (OUTPATIENT)
Dept: ENDOCRINOLOGY | Age: 67
End: 2022-06-06
Payer: MEDICARE

## 2022-06-06 VITALS
HEIGHT: 71 IN | SYSTOLIC BLOOD PRESSURE: 128 MMHG | HEART RATE: 71 BPM | BODY MASS INDEX: 31.95 KG/M2 | DIASTOLIC BLOOD PRESSURE: 66 MMHG | WEIGHT: 228.2 LBS

## 2022-06-06 DIAGNOSIS — E78.5 HYPERLIPIDEMIA LDL GOAL <100: ICD-10-CM

## 2022-06-06 DIAGNOSIS — I10 ESSENTIAL HYPERTENSION, BENIGN: ICD-10-CM

## 2022-06-06 DIAGNOSIS — E55.9 VITAMIN D DEFICIENCY: ICD-10-CM

## 2022-06-06 DIAGNOSIS — E11.65 UNCONTROLLED TYPE 2 DIABETES MELLITUS WITH HYPERGLYCEMIA (HCC): Primary | ICD-10-CM

## 2022-06-06 PROCEDURE — 1101F PT FALLS ASSESS-DOCD LE1/YR: CPT | Performed by: INTERNAL MEDICINE

## 2022-06-06 PROCEDURE — G8427 DOCREV CUR MEDS BY ELIG CLIN: HCPCS | Performed by: INTERNAL MEDICINE

## 2022-06-06 PROCEDURE — G8536 NO DOC ELDER MAL SCRN: HCPCS | Performed by: INTERNAL MEDICINE

## 2022-06-06 PROCEDURE — G8752 SYS BP LESS 140: HCPCS | Performed by: INTERNAL MEDICINE

## 2022-06-06 PROCEDURE — G8432 DEP SCR NOT DOC, RNG: HCPCS | Performed by: INTERNAL MEDICINE

## 2022-06-06 PROCEDURE — G8754 DIAS BP LESS 90: HCPCS | Performed by: INTERNAL MEDICINE

## 2022-06-06 PROCEDURE — 3017F COLORECTAL CA SCREEN DOC REV: CPT | Performed by: INTERNAL MEDICINE

## 2022-06-06 PROCEDURE — 99214 OFFICE O/P EST MOD 30 MIN: CPT | Performed by: INTERNAL MEDICINE

## 2022-06-06 PROCEDURE — G8417 CALC BMI ABV UP PARAM F/U: HCPCS | Performed by: INTERNAL MEDICINE

## 2022-06-06 PROCEDURE — 1123F ACP DISCUSS/DSCN MKR DOCD: CPT | Performed by: INTERNAL MEDICINE

## 2022-06-06 PROCEDURE — 2022F DILAT RTA XM EVC RTNOPTHY: CPT | Performed by: INTERNAL MEDICINE

## 2022-06-06 PROCEDURE — 3052F HG A1C>EQUAL 8.0%<EQUAL 9.0%: CPT | Performed by: INTERNAL MEDICINE

## 2022-06-06 RX ORDER — FUROSEMIDE 40 MG/1
40 TABLET ORAL DAILY
Qty: 90 TABLET | Refills: 3 | Status: SHIPPED | OUTPATIENT
Start: 2022-06-06

## 2022-06-06 RX ORDER — AMLODIPINE BESYLATE 10 MG/1
10 TABLET ORAL DAILY
Qty: 90 TABLET | Refills: 3 | Status: SHIPPED | OUTPATIENT
Start: 2022-06-06

## 2022-06-06 RX ORDER — HUMAN INSULIN 100 [IU]/ML
INJECTION, SUSPENSION SUBCUTANEOUS
Qty: 45 ML | Refills: 11
Start: 2022-06-06

## 2022-06-06 RX ORDER — HYDROCHLOROTHIAZIDE 12.5 MG/1
12.5 TABLET ORAL DAILY
COMMUNITY
End: 2022-07-05 | Stop reason: SDUPTHER

## 2022-06-06 NOTE — PATIENT INSTRUCTIONS
1) Your Hemoglobin A1c (3 month test of blood sugar) is 8.9% down from 12.3% and is the best it's been in 7 years. Keep up the good work. 2) BUN and creatinine are markers of kidney function. Your values are normal.    3) Dose your 70/30 insulin 50 units before breakfast, 20 units before lunch and 40 units before dinner and adjust your dose based on the scale below:    Blood sugar  Insulin dose          Less than 90  Eat first, take 5 units less       Normal dose as above    151-200  Add 5 units      201-250  Add 10 units     251-300  Add 15 units      301-350  Add 20 units      351-400  Add 25 units  401-450  Add 30 units  451-500  Add 35 units  Over 500  Add 40 units    4) See if taking a little more in the morning helps prevent as many spikes in the mid-day and if taking a little less at dinner, helps with any lows overnight. 5) Your blood pressure is controlled and vitamin D is normal.    6) Use up the furosemide 20 mg tabs that you have taking 2 tabs in the morning for fluid. I sent 40 mg tabs to the pharmacy to take 1 tab daily going forward.

## 2022-06-06 NOTE — PROGRESS NOTES
Chief Complaint   Patient presents with    Diabetes     pcp and pharmacy confirmed     History of Present Illness: Amelia Martinez is a 74 y.o. male here for follow up of diabetes. Weight up 5 lbs since last visit in person in 2/20. Ended up starting the freestyle emerson and this has been very helpful to improve his control. Dosing his insulin based on the scale below. He can sometimes have overnight lows in the 50-80 range. He often will spike in the 200-300 range by lunch and then down around 200 by dinner. Went back on hctz after last visit as his BP went up off this and his legs are still staying swollen despite being on this and the lasix. Compliant with lipitor and vitamin D.    he has the following indications to continut treatment with Freestyle Emerson:  1) he has type 2 diabetes (E11.65) and is on an intensive insulin regimen with 3 injections per day  2) he currently test his blood sugar 4-6 times daily on his Swift and makes treatment decisions off his freestyle emerson sensor readings  3) he requires frequent adjustments to his insulin injection doses based on his freestyle emerson sensor readings  4) he has benefitted from therapeutic continuous glucose monitoring and I recommend that he continue this  5) he is seen in my office every 6 months      Current Outpatient Medications   Medication Sig    hydroCHLOROthiazide (HYDRODIURIL) 12.5 mg tablet Take 12.5 mg by mouth daily.  mv,Ca,min/iron/FA/guarana/caff (ONE-A-DAY ENERGY PO) Take  by mouth daily.     metFORMIN ER (GLUCOPHAGE XR) 500 mg tablet TAKE 4 TABLETS BY MOUTH ONCE DAILY    atorvastatin (LIPITOR) 10 mg tablet Take 1 tablet by mouth once daily    dilTIAZem ER (TIAZAC) 360 mg capsule Take 1 capsule by mouth once daily    insulin nph-regular human rec (NovoLIN 70-30 FlexPen U-100) 100 unit/mL (70-30) inpn Inject 40 units before breakfast, 20 units before lunch and 50 units before dinner + 5 units for every 50 mg/dl above 150 mg/dl--max 150 units/day--dispense relion if brand novolin is not covered    Insulin Needles, Disposable, (BD Ultra-Fine Mini Pen Needle) 31 gauge x 3/16\" ndle Use as directed 3 times daily    carvediloL (COREG) 6.25 mg tablet TAKE 1 TABLET BY MOUTH TWICE DAILY WITH MEALS    furosemide (LASIX) 20 mg tablet Take 1 Tablet by mouth daily.  losartan-hydroCHLOROthiazide (HYZAAR) 100-12.5 mg per tablet Take 1 tablet by mouth once daily    multivitamin, tx-iron-ca-min (THERA-M w/ IRON) 9 mg iron-400 mcg tab tablet Take 1 Tablet by mouth daily.  amLODIPine (NORVASC) 10 mg tablet Take 1 Tablet by mouth daily. Delete the 5 mg tabs from profile    cyanocobalamin, vitamin B-12, (VITAMIN B-12 PO) Take 5,000 mcg by mouth daily.  cholecalciferol (VITAMIN D3) (1000 Units /25 mcg) tablet Take  by mouth daily.  acetaminophen (TYLENOL ARTHRITIS PAIN) 650 mg TbER Take 650 mg by mouth every eight (8) hours.  aspirin 81 mg chewable tablet Take 81 mg by mouth daily. No current facility-administered medications for this visit. Allergies   Allergen Reactions    Jardiance [Empagliflozin] Other (comments)     Yeast infection     Review of Systems: PER HPI    Physical Examination:  Blood pressure 128/66, pulse 71, height 5' 11\" (1.803 m), weight 228 lb 3.2 oz (103.5 kg).   - General: pleasant, no distress, good eye contact   - Neck: no carotid bruits  - Cardiovascular: regular, normal rate, nl s1 and s2, no m/r/g,   - Respiratory: clear bilaterally  - Integumentary: trace non-pitting edema,   - Psychiatric: normal mood and affect    Diabetic foot exam:     Left Foot:   Visual Exam: callous - mild   Pulse DP: 2+ (normal)   Filament test: normal sensation    Vibratory sensation: diminished      Right Foot:   Visual Exam: callous - mild   Pulse DP: 2+ (normal)   Filament test: normal sensation    Vibratory sensation: diminished        Data Reviewed:   Component      Latest Ref Rng & Units 6/1/2022 6/1/2022 6/1/2022           8:33 AM 8:33 AM  8:33 AM   Glucose      65 - 99 mg/dL   109 (H)   BUN      8 - 27 mg/dL   10   Creatinine      0.76 - 1.27 mg/dL   0.86   eGFR      >59 mL/min/1.73   94   BUN/Creatinine ratio      10 - 24   12   Sodium      134 - 144 mmol/L   140   Potassium      3.5 - 5.2 mmol/L   4.4   Chloride      96 - 106 mmol/L   101   CO2      20 - 29 mmol/L   24   Calcium      8.6 - 10.2 mg/dL   9.3   Hemoglobin A1c, (calculated)      4.8 - 5.6 %  8.9 (H)    Estimated average glucose      mg/dL  209    VITAMIN D, 25-HYDROXY      30.0 - 100.0 ng/mL 50.4         Assessment/Plan:     1. Type II or unspecified type diabetes mellitus without mention of complication, uncontrolled ([facility] [address]): his most recent Hgb A1c was 8.9% in 6/22 down from 12.3% in 11/21 up from 11.3% in 10/21 down from 11.5% in 5/21 up from 11% in 11/20 up from 10.2% in 6/20 down from 11% in 2/20 down from 12% in 6/19 up from 9.4% in 10/18 up from 9.2% in 2/18 down from 9.9% in 9/17 up from 9.4% in 3/17 down from 10.6% in 10/16 up from 9.9% in 4/16 down from 11.4% in 1/16 up from 10.2% in 9/15 up from 10% in 1/15. A1c best in 7 years with starting marcus and dosing 3 times daily. Will give more insulin with breakfast to help with mid-day spikes and less with dinner to prevent overnight lows. - take 70/30 insulin 50 units before breakfast, 20 units before lunch and 40 units before dinner + 5 units for every 50 mg/dl above 150 mg/dl  - cont metformin  mg tabs 2 tabs bid  - check bs 3 times daily   - foot exam done 6/22  - optho UTD 11/19  - microalbumin nl 11/21  - check A1c and bmp in 5/23  - check Hgb A1c, cmp, and microalbumin prior to next visit        2. Unspecified essential hypertension: his BP was at goal < 140/90. Increased lasix to help with edema. - cont amlodipine 5 mg daily  - cont diltiazem 360 mg daily  - cont losartan/hct 100/12.5 mg daily  - cont hctz 12.5 mg daily  - increase lasix to 40 mg daily  - monitor home blood pressure readings        3. Unspecified vitamin D deficiency: Level was 28.1 in 9/15 and with taking 1000 units daily up to 37 in 1/16. Down to 29 in 10/16 so increased to 2000 units daily and up to 41 in 3/17 and in 2/18. Down to 33.9 in 6/19 and 51 in 6/20 and 54 in 5/21 and 50 in 6/22  - cont vitamin D 2000 units daily  - check Vitamin D 25-OH level in 6/23        4. Hyperlipidemia: Given DM, Goal LDL < 100, non-HDL < 130, and TG < 150. LDL 79 in 1/15 and 68 in 9/15 and 70 in 4/16 and 94 in 3/17 not on meds. Up to 100 in 2/18 and 103 in 10/18 and started on lipitor 10 mg in summer 2019 and down to 59 in 2/20 and in 11/20 and 53 in 11/21  - cont lipitor 10 mg daily  - check lipids prior to next visit      Patient Instructions   1) Your Hemoglobin A1c (3 month test of blood sugar) is 8.9% down from 12.3% and is the best it's been in 7 years. Keep up the good work. 2) BUN and creatinine are markers of kidney function. Your values are normal.    3) Dose your 70/30 insulin 50 units before breakfast, 20 units before lunch and 40 units before dinner and adjust your dose based on the scale below:    Blood sugar  Insulin dose          Less than 90  Eat first, take 5 units less       Normal dose as above    151-200  Add 5 units      201-250  Add 10 units     251-300  Add 15 units      301-350  Add 20 units      351-400  Add 25 units  401-450  Add 30 units  451-500  Add 35 units  Over 500  Add 40 units    4) See if taking a little more in the morning helps prevent as many spikes in the mid-day and if taking a little less at dinner, helps with any lows overnight. 5) Your blood pressure is controlled and vitamin D is normal.    6) Use up the furosemide 20 mg tabs that you have taking 2 tabs in the morning for fluid. I sent 40 mg tabs to the pharmacy to take 1 tab daily going forward. Follow-up and Dispositions    · Return in about 6 months (around 12/6/2022).                Copy sent to:  Jenn Waggoner MD

## 2022-07-05 RX ORDER — HYDROCHLOROTHIAZIDE 12.5 MG/1
12.5 TABLET ORAL DAILY
Qty: 90 TABLET | Refills: 3 | Status: SHIPPED | OUTPATIENT
Start: 2022-07-05

## 2022-07-06 NOTE — TELEPHONE ENCOUNTER
[facility]

## 2022-09-19 ENCOUNTER — OFFICE VISIT (OUTPATIENT)
Dept: FAMILY MEDICINE CLINIC | Age: 67
End: 2022-09-19
Payer: MEDICARE

## 2022-09-19 VITALS
SYSTOLIC BLOOD PRESSURE: 165 MMHG | RESPIRATION RATE: 18 BRPM | WEIGHT: 221.6 LBS | DIASTOLIC BLOOD PRESSURE: 70 MMHG | HEIGHT: 70 IN | HEART RATE: 67 BPM | OXYGEN SATURATION: 97 % | TEMPERATURE: 98 F | BODY MASS INDEX: 31.73 KG/M2

## 2022-09-19 DIAGNOSIS — N42.9 PROSTATE DISORDER: ICD-10-CM

## 2022-09-19 DIAGNOSIS — Z23 ENCOUNTER FOR IMMUNIZATION: ICD-10-CM

## 2022-09-19 DIAGNOSIS — I10 ESSENTIAL HYPERTENSION, BENIGN: ICD-10-CM

## 2022-09-19 DIAGNOSIS — Z00.00 ROUTINE GENERAL MEDICAL EXAMINATION AT A HEALTH CARE FACILITY: Primary | ICD-10-CM

## 2022-09-19 DIAGNOSIS — E11.65 UNCONTROLLED TYPE 2 DIABETES MELLITUS WITH HYPERGLYCEMIA (HCC): ICD-10-CM

## 2022-09-19 DIAGNOSIS — M25.552 LEFT HIP PAIN: ICD-10-CM

## 2022-09-19 PROCEDURE — 2022F DILAT RTA XM EVC RTNOPTHY: CPT | Performed by: FAMILY MEDICINE

## 2022-09-19 PROCEDURE — 3052F HG A1C>EQUAL 8.0%<EQUAL 9.0%: CPT | Performed by: FAMILY MEDICINE

## 2022-09-19 PROCEDURE — G8432 DEP SCR NOT DOC, RNG: HCPCS | Performed by: FAMILY MEDICINE

## 2022-09-19 PROCEDURE — 1101F PT FALLS ASSESS-DOCD LE1/YR: CPT | Performed by: FAMILY MEDICINE

## 2022-09-19 PROCEDURE — 99214 OFFICE O/P EST MOD 30 MIN: CPT | Performed by: FAMILY MEDICINE

## 2022-09-19 PROCEDURE — G8536 NO DOC ELDER MAL SCRN: HCPCS | Performed by: FAMILY MEDICINE

## 2022-09-19 PROCEDURE — 90732 PPSV23 VACC 2 YRS+ SUBQ/IM: CPT | Performed by: FAMILY MEDICINE

## 2022-09-19 PROCEDURE — G0439 PPPS, SUBSEQ VISIT: HCPCS | Performed by: FAMILY MEDICINE

## 2022-09-19 PROCEDURE — G8427 DOCREV CUR MEDS BY ELIG CLIN: HCPCS | Performed by: FAMILY MEDICINE

## 2022-09-19 PROCEDURE — G8417 CALC BMI ABV UP PARAM F/U: HCPCS | Performed by: FAMILY MEDICINE

## 2022-09-19 PROCEDURE — 3017F COLORECTAL CA SCREEN DOC REV: CPT | Performed by: FAMILY MEDICINE

## 2022-09-19 PROCEDURE — G0463 HOSPITAL OUTPT CLINIC VISIT: HCPCS | Performed by: FAMILY MEDICINE

## 2022-09-19 PROCEDURE — 90694 VACC AIIV4 NO PRSRV 0.5ML IM: CPT | Performed by: FAMILY MEDICINE

## 2022-09-19 PROCEDURE — 1123F ACP DISCUSS/DSCN MKR DOCD: CPT | Performed by: FAMILY MEDICINE

## 2022-09-19 PROCEDURE — G8753 SYS BP > OR = 140: HCPCS | Performed by: FAMILY MEDICINE

## 2022-09-19 PROCEDURE — G8754 DIAS BP LESS 90: HCPCS | Performed by: FAMILY MEDICINE

## 2022-09-19 RX ORDER — HUMAN INSULIN 100 [IU]/ML
INJECTION, SUSPENSION SUBCUTANEOUS
COMMUNITY

## 2022-09-19 RX ORDER — NUTRITIONAL SUPPLEMENT/FIBER
LIQUID (GRAM) ORAL
COMMUNITY

## 2022-09-19 RX ORDER — GLUCOSAM/CHONDRO/HERB 149/HYAL 750-100 MG
TABLET ORAL
COMMUNITY

## 2022-09-19 RX ORDER — LANOLIN ALCOHOL/MO/W.PET/CERES
1 CREAM (GRAM) TOPICAL DAILY
COMMUNITY

## 2022-09-19 NOTE — PROGRESS NOTES
Patient identified with two identification factors, Name and Date of Birth. Chief Complaint   Patient presents with    Annual Wellness Visit       Visit Vitals  BP (!) 165/70 (BP 1 Location: Left arm, BP Patient Position: Sitting, BP Cuff Size: Adult)   Pulse 67   Temp 98 °F (36.7 °C) (Oral)   Resp 18   Ht 5' 10\" (1.778 m)   Wt 221 lb 9.6 oz (100.5 kg)   SpO2 97%   BMI 31.80 kg/m²       Health Maintenance Due   Topic    Shingrix Vaccine Age 50> (1 of 2)    Pneumococcal 65+ years (3 - PPSV23 or PCV20)    DTaP/Tdap/Td series (2 - Td or Tdap)    COVID-19 Vaccine (3 - Booster for Moderna series)    Medicare Yearly Exam     Flu Vaccine (1)      Fall Risk Assessment, last 12 mths 9/19/2022   Able to walk? Yes   Fall in past 12 months? 0   Do you feel unsteady? 0   Are you worried about falling 0       1. \"Have you been to the ER, urgent care clinic since your last visit? Hospitalized since your last visit? \" Yes. Urgent Care 09/13/22 for hip pain. 2. \"Have you seen or consulted any other health care providers outside of the [address] [place] [address] since your last visit? \" No     3. For patients aged 39-70: Has the patient had a colonoscopy / FIT/ Cologuard? Yes - no Care Gap present      If the patient is female:    4. For patients aged 41-77: Has the patient had a mammogram within the past 2 years? NA - based on age or sex      11. For patients aged 21-65: Has the patient had a pap smear?  NA - based on age or sex

## 2022-09-19 NOTE — PROGRESS NOTES
After obtaining consent, and per orders of Dr. Marielena Ge, injection of Pneumovax 23 (left deltoid) and Influenza FLUAD (right deltoid) given by Tommie Landry LPN at 0:34 pm. Patient instructed to remain in clinic for 20 minutes afterwards, and to report any adverse reaction to me immediately.

## 2022-09-19 NOTE — PROGRESS NOTES
Medicare Annual Wellness Visit     I have reviewed the patient's medical history in detail and updated the computerized patient record. History   Rosa Perez is a 69 y.o. male who presents to follow-up on chronic medical issues. Has been taking care of his wife for the last year and she passed he had late August.  He is now trying to get his health under control. Has been dealing with left hip pain for about a year. Went to urgent care with this a few weeks ago had x-rays that showed arthritis worse on the left than the right. Has an appointment to see orthopedist in 2 weeks. He did some self-guided physical therapy that was recommended by his wife's physical therapist.  Was really good about it for a few days and did see some improvement but then stopped doing it. He has since tried meloxicam, prednisone, topicals. Of the options topicals seem to work the best.    Has been having a right occipital headache for the last few weeks. His blood pressure had been well controlled prior to his wife's passing and when he has checked it since then it has been in the 389D systolic. Past Medical History:   Diagnosis Date    Arthritis of knee     Elevated PSA     Pneumonia 2007    Type II or unspecified type diabetes mellitus without mention of complication, uncontrolled late 1990s    Unspecified essential hypertension     Unspecified vitamin D deficiency       Past Surgical History:   Procedure Laterality Date    HX BACK SURGERY      HX CYST INCISION AND DRAINAGE      chest wall boil    HX HERNIA REPAIR      bilateral    HX ORTHOPAEDIC      left 5th toe surgery    HX ORTHOPAEDIC      right elbow surgery    HX ORTHOPAEDIC  03/27/2019    left carpal tunnel and ulnar nerve release    HX SEPTOPLASTY       Current Outpatient Medications   Medication Sig Dispense Refill    nutritional supplement-fiber (Amparo Deis) liqd Take  by mouth. OTHER daily. Stop pain.       carvediloL (COREG) 6.25 mg tablet TAKE 1 TABLET BY MOUTH TWICE DAILY WITH MEALS 60 Tablet 2    hydroCHLOROthiazide (HYDRODIURIL) 12.5 mg tablet Take 1 Tablet by mouth daily. 90 Tablet 3    mv,Ca,min/iron/FA/guarana/caff (ONE-A-DAY ENERGY PO) Take  by mouth daily. furosemide (LASIX) 40 mg tablet Take 1 Tablet by mouth daily. Delete the 20 mg dose from profile 90 Tablet 3    amLODIPine (NORVASC) 10 mg tablet Take 1 Tablet by mouth daily. 90 Tablet 3    insulin nph-regular human rec (NovoLIN 70-30 FlexPen U-100) 100 unit/mL (70-30) inpn Inject 50 units before breakfast, 20 units before lunch and 40 units before dinner + 5 units for every 50 mg/dl above 150 mg/dl--max 150 units/day--dispense relion if brand novolin is not covered--Dose change 06/06/22--updated med list--did not send prescription to the pharmacy 45 mL 11    metFORMIN ER (GLUCOPHAGE XR) 500 mg tablet TAKE 4 TABLETS BY MOUTH ONCE DAILY 360 Tablet 3    atorvastatin (LIPITOR) 10 mg tablet Take 1 tablet by mouth once daily 90 Tablet 0    dilTIAZem ER (TIAZAC) 360 mg capsule Take 1 capsule by mouth once daily 90 Capsule 3    Insulin Needles, Disposable, (BD Ultra-Fine Mini Pen Needle) 31 gauge x 3/16\" ndle Use as directed 3 times daily 100 Pen Needle 11    losartan-hydroCHLOROthiazide (HYZAAR) 100-12.5 mg per tablet Take 1 tablet by mouth once daily 90 Tablet 3    multivitamin, tx-iron-ca-min (THERA-M w/ IRON) 9 mg iron-400 mcg tab tablet Take 1 Tablet by mouth daily. cyanocobalamin, vitamin B-12, (VITAMIN B-12 PO) Take 5,000 mcg by mouth daily. cholecalciferol (VITAMIN D3) (1000 Units /25 mcg) tablet Take  by mouth daily. acetaminophen (TYLENOL) 650 mg TbER Take 650 mg by mouth every eight (8) hours. aspirin 81 mg chewable tablet Take 81 mg by mouth daily. glucosamine-chondroitin (ARTHX) 500-400 mg cap Take 1 Capsule by mouth daily.       omega 3-DHA-EPA-fish oil (Fish OiL) 1,000 mg (120 mg-180 mg) capsule       insulin nph-regular human rec (NovoLIN 70-30 FlexPen U-100) 100 unit/mL (70-30) inpn        Allergies   Allergen Reactions    Jardiance [Empagliflozin] Other (comments)     Yeast infection     Family History   Problem Relation Age of Onset    Diabetes Mother     Diabetes Maternal Grandmother     Stroke Father 79     Social History     Tobacco Use    Smoking status: Never    Smokeless tobacco: Never   Substance Use Topics    Alcohol use: Yes     Comment: beer once a year     Patient Active Problem List   Diagnosis Code    Type II diabetes mellitus, uncontrolled [id number]    Essential hypertension, benign I10    Vitamin D deficiency E55.9    Cubital tunnel syndrome G56.20       Depression Risk Factor Screening:     3 most recent PHQ Screens 9/19/2022   Little interest or pleasure in doing things Several days   Feeling down, depressed, irritable, or hopeless Several days   Total Score PHQ 2 2     Alcohol Risk Factor Screening: On any occasion during the past 3 months, have you had more than 4 drinks containing alcohol?  no    Do you average more than 14 drinks per week?  no      Functional Ability and Level of Safety:     Hearing Loss   none    Activities of Daily Living   Self-care. Requires assistance with: no ADLs    Fall Risk     Fall Risk Assessment, last 12 mths 9/19/2022   Able to walk? Yes   Fall in past 12 months? 0   Do you feel unsteady? 0   Are you worried about falling 0     Abuse Screen   Patient is not abused    Review of Systems   ROS:  As listed in HPI.  In addition:  Constitutional:   No headache, fever, fatigue, weight loss or weight gain      Eyes:   No redness, pruritis, pain, visual changes, swelling, or discharge      Ears:    No pain, loss or changes in hearing     Cardiac:    No chest pain      Resp:   No cough or shortness of breath      Neuro   No loss of consciousness, dizziness, seizure    Physical Examination     Evaluation of Cognitive Function:  Mood/affect:  happy  Appearance: age appropriate  Family member/caregiver input:     Physical Exam:  Blood pressure (!) 165/70, pulse 67, temperature 98 °F (36.7 °C), temperature source Oral, resp. rate 18, height 5' 10\" (1.778 m), weight 221 lb 9.6 oz (100.5 kg), SpO2 97 %. GEN: No apparent distress. Alert and oriented and responds to all questions appropriately. NECK:  Supple; no masses; thyroid normal           LUNGS: Respirations unlabored; clear to auscultation bilaterally  CARDIOVASCULAR: Regular, rate, and rhythm without murmurs   ABDOMEN: Soft; nontender; nondistended; normoactive bowel sounds; no masses or organomegaly  NEUROLOGIC:  No focal neurologic deficits. Strength and sensation grossly intact. Coordination and gait grossly intact. EXT: Well perfused. No edema. SKIN: No obvious rashes. Patient Care Team:  Wendy Najera MD as PCP - General (Family Medicine)  [facility]  Kemal Flowers MD as Consulting Provider (Endocrinology Physician)  Shawn Ac MD (Orthopedic Surgery)    Advice/Referrals/Counseling   Education and counseling provided:    Flu shot today    Pneumonia 23 vaccine today    Recommend COVID booster when available    Recommend shingles vaccine    PSA screening    Assessment/Plan     Hypertension  Elevated today, did not come down on recheck  Home readings consistent with this blood pressure. This is only been the case for about a month since his wife's passing. Prior to that his blood pressure was pretty well controlled. We decided to keep an eye on this and follow-up in 2 months if still elevated on home readings.     He is taking  Losartan-HCTZ 100-12 0.5  And additional HCTZ 12.5  Carvedilol 6.25  Amlodipine 10  Diltiazem 360    Left hip arthritis  Will be seeing orthopedist which is appropriate  He has not done formal physical therapy will refer him since exercises did help at least somewhat in the past.  He has of self-guided exercises and I encouraged him to get started on that  He has tried and failed meloxicam  Tried to help prednisone  We can try another NSAID but it is unlikely to be helpful. He has found topicals to be somewhat helpful and exercise to be somewhat helpful so recommend he double down on those interventions. Diabetes  Care of endocrinology. ICD-10-CM ICD-9-CM    1. Routine general medical examination at a health care facility  Z00.00 V70.0       2. Essential hypertension, benign  I10 401.1 LIPID PANEL      CBC WITH AUTOMATED DIFF      METABOLIC PANEL, COMPREHENSIVE      3. Uncontrolled type 2 diabetes mellitus with hyperglycemia (HCC)  E11.65 250.02       4. Prostate disorder  N42.9 602.9 PSA, DIAGNOSTIC (PROSTATE SPECIFIC AG)      5. Encounter for immunization  Z23 V03.89 PNEUMOCOCCAL, PPSV23, (AGE 2 YRS+), SC/IM      INFLUENZA, FLUAD, (AGE 72 Y+), IM, PF, 0.5 ML      ADMIN INFLUENZA VIRUS VAC      ADMIN PNEUMOCOCCAL VACCINE      6.  Left hip pain  M25.552 719.45 REFERRAL TO PHYSICAL THERAPY

## 2022-09-27 ENCOUNTER — TELEPHONE (OUTPATIENT)
Dept: FAMILY MEDICINE CLINIC | Age: 67
End: 2022-09-27

## 2022-09-27 DIAGNOSIS — R97.20 ELEVATED PSA: Primary | ICD-10-CM

## 2022-09-27 LAB
ALBUMIN SERPL-MCNC: 4.6 G/DL (ref 3.8–4.8)
ALBUMIN/GLOB SERPL: 1.5 {RATIO} (ref 1.2–2.2)
ALP SERPL-CCNC: 100 IU/L (ref 44–121)
ALT SERPL-CCNC: 14 IU/L (ref 0–44)
AST SERPL-CCNC: 15 IU/L (ref 0–40)
BASOPHILS # BLD AUTO: 0 X10E3/UL (ref 0–0.2)
BASOPHILS NFR BLD AUTO: 0 %
BILIRUB SERPL-MCNC: 0.5 MG/DL (ref 0–1.2)
BUN SERPL-MCNC: 15 MG/DL (ref 8–27)
BUN/CREAT SERPL: 17 (ref 10–24)
CALCIUM SERPL-MCNC: 9.7 MG/DL (ref 8.6–10.2)
CHLORIDE SERPL-SCNC: 99 MMOL/L (ref 96–106)
CHOLEST SERPL-MCNC: 128 MG/DL (ref 100–199)
CO2 SERPL-SCNC: 27 MMOL/L (ref 20–29)
CREAT SERPL-MCNC: 0.9 MG/DL (ref 0.76–1.27)
EGFR: 92 ML/MIN/1.73
EOSINOPHIL # BLD AUTO: 0.1 X10E3/UL (ref 0–0.4)
EOSINOPHIL NFR BLD AUTO: 1 %
ERYTHROCYTE [DISTWIDTH] IN BLOOD BY AUTOMATED COUNT: 12.9 % (ref 11.6–15.4)
GLOBULIN SER CALC-MCNC: 3 G/DL (ref 1.5–4.5)
GLUCOSE SERPL-MCNC: 147 MG/DL (ref 70–99)
HCT VFR BLD AUTO: 41.4 % (ref 37.5–51)
HDLC SERPL-MCNC: 55 MG/DL
HGB BLD-MCNC: 13.9 G/DL (ref 13–17.7)
IMM GRANULOCYTES # BLD AUTO: 0.1 X10E3/UL (ref 0–0.1)
IMM GRANULOCYTES NFR BLD AUTO: 1 %
IMP & REVIEW OF LAB RESULTS: NORMAL
LDLC SERPL CALC-MCNC: 59 MG/DL (ref 0–99)
LYMPHOCYTES # BLD AUTO: 2.4 X10E3/UL (ref 0.7–3.1)
LYMPHOCYTES NFR BLD AUTO: 30 %
MCH RBC QN AUTO: 31.2 PG (ref 26.6–33)
MCHC RBC AUTO-ENTMCNC: 33.6 G/DL (ref 31.5–35.7)
MCV RBC AUTO: 93 FL (ref 79–97)
MONOCYTES # BLD AUTO: 0.6 X10E3/UL (ref 0.1–0.9)
MONOCYTES NFR BLD AUTO: 7 %
NEUTROPHILS # BLD AUTO: 4.8 X10E3/UL (ref 1.4–7)
NEUTROPHILS NFR BLD AUTO: 61 %
PLATELET # BLD AUTO: 298 X10E3/UL (ref 150–450)
POTASSIUM SERPL-SCNC: 4.6 MMOL/L (ref 3.5–5.2)
PROT SERPL-MCNC: 7.6 G/DL (ref 6–8.5)
PSA SERPL-MCNC: 7.4 NG/ML (ref 0–4)
RBC # BLD AUTO: 4.46 X10E6/UL (ref 4.14–5.8)
SODIUM SERPL-SCNC: 138 MMOL/L (ref 134–144)
TRIGL SERPL-MCNC: 66 MG/DL (ref 0–149)
VLDLC SERPL CALC-MCNC: 14 MG/DL (ref 5–40)
WBC # BLD AUTO: 7.9 X10E3/UL (ref 3.4–10.8)

## 2022-09-27 NOTE — TELEPHONE ENCOUNTER
Labs reviewed. PSA (prostate number) is elevated. This is a screening test for prostate cancer but does not diagnose prostate cancer.   When it is at this level we refer to urology to follow and consider further work-up if necessary    Please give phone number for [facility]: [phone]

## 2022-10-30 DIAGNOSIS — I10 ESSENTIAL HYPERTENSION, BENIGN: ICD-10-CM

## 2022-10-31 RX ORDER — CARVEDILOL 6.25 MG/1
TABLET ORAL
Qty: 60 TABLET | Refills: 3 | Status: SHIPPED | OUTPATIENT
Start: 2022-10-31

## 2022-10-31 RX ORDER — LOSARTAN POTASSIUM AND HYDROCHLOROTHIAZIDE 12.5; 1 MG/1; MG/1
TABLET ORAL
Qty: 90 TABLET | Refills: 3 | Status: SHIPPED | OUTPATIENT
Start: 2022-10-31

## 2022-11-20 DIAGNOSIS — E55.9 VITAMIN D DEFICIENCY: ICD-10-CM

## 2022-11-20 DIAGNOSIS — E11.65 UNCONTROLLED TYPE 2 DIABETES MELLITUS WITH HYPERGLYCEMIA (HCC): ICD-10-CM

## 2022-11-20 DIAGNOSIS — I10 ESSENTIAL HYPERTENSION, BENIGN: ICD-10-CM

## 2022-11-20 DIAGNOSIS — E78.5 HYPERLIPIDEMIA LDL GOAL <100: ICD-10-CM

## 2022-11-28 DIAGNOSIS — E11.65 UNCONTROLLED TYPE 2 DIABETES MELLITUS WITH HYPERGLYCEMIA (HCC): ICD-10-CM

## 2022-11-28 RX ORDER — ATORVASTATIN CALCIUM 10 MG/1
TABLET, FILM COATED ORAL
Qty: 90 TABLET | Refills: 3 | Status: SHIPPED | OUTPATIENT
Start: 2022-11-28

## 2022-12-06 ENCOUNTER — OFFICE VISIT (OUTPATIENT)
Dept: ENDOCRINOLOGY | Age: 67
End: 2022-12-06
Payer: MEDICARE

## 2022-12-06 VITALS
BODY MASS INDEX: 32.04 KG/M2 | WEIGHT: 223.8 LBS | SYSTOLIC BLOOD PRESSURE: 126 MMHG | DIASTOLIC BLOOD PRESSURE: 58 MMHG | HEIGHT: 70 IN | HEART RATE: 66 BPM

## 2022-12-06 DIAGNOSIS — E78.5 HYPERLIPIDEMIA LDL GOAL <100: ICD-10-CM

## 2022-12-06 DIAGNOSIS — E55.9 VITAMIN D DEFICIENCY: ICD-10-CM

## 2022-12-06 DIAGNOSIS — I10 ESSENTIAL HYPERTENSION, BENIGN: ICD-10-CM

## 2022-12-06 DIAGNOSIS — E11.65 UNCONTROLLED TYPE 2 DIABETES MELLITUS WITH HYPERGLYCEMIA (HCC): Primary | ICD-10-CM

## 2022-12-06 PROCEDURE — 2022F DILAT RTA XM EVC RTNOPTHY: CPT | Performed by: INTERNAL MEDICINE

## 2022-12-06 PROCEDURE — G8417 CALC BMI ABV UP PARAM F/U: HCPCS | Performed by: INTERNAL MEDICINE

## 2022-12-06 PROCEDURE — 99214 OFFICE O/P EST MOD 30 MIN: CPT | Performed by: INTERNAL MEDICINE

## 2022-12-06 PROCEDURE — 3074F SYST BP LT 130 MM HG: CPT | Performed by: INTERNAL MEDICINE

## 2022-12-06 PROCEDURE — G8536 NO DOC ELDER MAL SCRN: HCPCS | Performed by: INTERNAL MEDICINE

## 2022-12-06 PROCEDURE — G8754 DIAS BP LESS 90: HCPCS | Performed by: INTERNAL MEDICINE

## 2022-12-06 PROCEDURE — 3052F HG A1C>EQUAL 8.0%<EQUAL 9.0%: CPT | Performed by: INTERNAL MEDICINE

## 2022-12-06 PROCEDURE — G8432 DEP SCR NOT DOC, RNG: HCPCS | Performed by: INTERNAL MEDICINE

## 2022-12-06 PROCEDURE — 95251 CONT GLUC MNTR ANALYSIS I&R: CPT | Performed by: INTERNAL MEDICINE

## 2022-12-06 PROCEDURE — G8427 DOCREV CUR MEDS BY ELIG CLIN: HCPCS | Performed by: INTERNAL MEDICINE

## 2022-12-06 PROCEDURE — 3078F DIAST BP <80 MM HG: CPT | Performed by: INTERNAL MEDICINE

## 2022-12-06 PROCEDURE — 3017F COLORECTAL CA SCREEN DOC REV: CPT | Performed by: INTERNAL MEDICINE

## 2022-12-06 PROCEDURE — 1123F ACP DISCUSS/DSCN MKR DOCD: CPT | Performed by: INTERNAL MEDICINE

## 2022-12-06 PROCEDURE — 1101F PT FALLS ASSESS-DOCD LE1/YR: CPT | Performed by: INTERNAL MEDICINE

## 2022-12-06 PROCEDURE — G8752 SYS BP LESS 140: HCPCS | Performed by: INTERNAL MEDICINE

## 2022-12-06 RX ORDER — CELECOXIB 200 MG/1
CAPSULE ORAL
COMMUNITY
Start: 2022-12-03

## 2022-12-06 RX ORDER — TAMSULOSIN HYDROCHLORIDE 0.4 MG/1
0.4 CAPSULE ORAL DAILY
COMMUNITY

## 2022-12-06 RX ORDER — HUMAN INSULIN 100 [IU]/ML
INJECTION, SUSPENSION SUBCUTANEOUS
Qty: 45 ML | Refills: 11
Start: 2022-12-06

## 2022-12-06 NOTE — PATIENT INSTRUCTIONS
1) Your Hemoglobin A1c (3 month test of blood sugar) is 8.1% down from 8.9% down from 12.3% and again is the best it's been in 8 years. We'll work to get this under 8% to have hip surgery. Keep up the good work. 2) BUN and creatinine are markers of kidney function. Your values are normal.    3) ALT and AST are markers of liver function. Your values are normal.    4) Your blood pressure and cholesterol and urine are normal.    5) Dose your 70/30 insulin 50 units before breakfast, 15 units before lunch and 40 units before dinner and adjust your dose based on the scale below:    Blood sugar  Insulin dose          Less than 90  Eat first, take 5 units less       Normal dose as above    151-200  Add 5 units      201-250  Add 10 units     251-300  Add 15 units      301-350  Add 20 units      351-400  Add 25 units  401-450  Add 30 units  451-500  Add 35 units  Over 500  Add 40 units    6) Repeat your A1c in 6 weeks and I'll let you know the results to see if you are under 8% at that time.

## 2022-12-06 NOTE — PROGRESS NOTES
Chief Complaint   Patient presents with    Diabetes     PCP and pharmacy confirmed      History of Present Illness: Pradip Taylor is a 69 y.o. male here for follow up of diabetes. Weight down 4 lbs since last visit in 6/22. His wife passed away in 8/22 after having another stroke so this has been hard. He is trying to get his A1c under 8% to have hip surgery with Dr. Krystyna Potts and he is very close. Review of his Chidi Pong over the past 30 days shows 77% time in range but he does tend to go low frequently before dinner and this leads to him sometimes skipping his dinner dose for fear of going low overnight now that he lives alone and then can sometimes have higher readings over night. Compliant with BP and lipid regimen. he has the following indications to continue treatment with Freestyle Emerson:  1) he has type 2 diabetes (E11.65) and is on an intensive insulin regimen with 3 injections per day  2) he currently test his blood sugar 4-6 times daily on his Chidi Pong and makes treatment decisions off his freestyle emerson sensor readings  3) he requires frequent adjustments to his insulin injection doses based on his freestyle emerson sensor readings  4) he has benefitted from therapeutic continuous glucose monitoring and I recommend that he continue this  5) he is seen in my office every 6 months            Current Outpatient Medications   Medication Sig    tamsulosin (FLOMAX) 0.4 mg capsule Take 0.4 mg by mouth daily. celecoxib (CELEBREX) 200 mg capsule     atorvastatin (LIPITOR) 10 mg tablet Take 1 tablet by mouth once daily    losartan-hydroCHLOROthiazide (HYZAAR) 100-12.5 mg per tablet Take 1 tablet by mouth once daily    carvediloL (COREG) 6.25 mg tablet TAKE 1 TABLET BY MOUTH TWICE DAILY WITH MEALS    nutritional supplement-fiber (Arlen Vandana) liqd Take  by mouth. glucosamine-chondroitin (ARTHX) 500-400 mg cap Take 1 Capsule by mouth daily. OTHER daily. Stop pain.     omega 3-DHA-EPA-fish oil 1,000 mg (120 mg-180 mg) capsule     hydroCHLOROthiazide (HYDRODIURIL) 12.5 mg tablet Take 1 Tablet by mouth daily. mv,Ca,min/iron/FA/guarana/caff (ONE-A-DAY ENERGY PO) Take  by mouth daily. furosemide (LASIX) 40 mg tablet Take 1 Tablet by mouth daily. Delete the 20 mg dose from profile    amLODIPine (NORVASC) 10 mg tablet Take 1 Tablet by mouth daily. insulin nph-regular human rec (NovoLIN 70-30 FlexPen U-100) 100 unit/mL (70-30) inpn Inject 50 units before breakfast, 20 units before lunch and 40 units before dinner + 5 units for every 50 mg/dl above 150 mg/dl--max 150 units/day--dispense relion if brand novolin is not covered--Dose change 06/06/22--updated med list--did not send prescription to the pharmacy    metFORMIN ER (GLUCOPHAGE XR) 500 mg tablet TAKE 4 TABLETS BY MOUTH ONCE DAILY    dilTIAZem ER (TIAZAC) 360 mg capsule Take 1 capsule by mouth once daily    Insulin Needles, Disposable, (BD Ultra-Fine Mini Pen Needle) 31 gauge x 3/16\" ndle Use as directed 3 times daily    multivitamin, tx-iron-ca-min (THERA-M w/ IRON) 9 mg iron-400 mcg tab tablet Take 1 Tablet by mouth daily. cyanocobalamin, vitamin B-12, (VITAMIN B-12 PO) Take 5,000 mcg by mouth daily. cholecalciferol (VITAMIN D3) (1000 Units /25 mcg) tablet Take  by mouth daily. acetaminophen (TYLENOL) 650 mg TbER Take 650 mg by mouth every eight (8) hours. aspirin 81 mg chewable tablet Take 81 mg by mouth daily. No current facility-administered medications for this visit. Allergies   Allergen Reactions    Jardiance [Empagliflozin] Other (comments)     Yeast infection     Review of Systems: PER HPI    Physical Examination:  Blood pressure (!) 126/58, pulse 66, height 5' 10\" (1.778 m), weight 223 lb 12.8 oz (101.5 kg).   General: pleasant, no distress, good eye contact   Neck: no carotid bruits  Cardiovascular: regular, normal rate, nl s1 and s2, no m/r/g,   Respiratory: clear bilaterally  Integumentary: no edema,   Psychiatric: normal mood and affect    Data Reviewed:   Component      Latest Ref Rng & Units 12/2/2022 12/2/2022 12/2/2022 12/2/2022          10:31 AM 10:31 AM 10:31 AM 10:31 AM   Glucose      70 - 99 mg/dL   123 (H)    BUN      8 - 27 mg/dL   16    Creatinine      0.76 - 1.27 mg/dL   0.88    eGFR      >59 mL/min/1.73   93    BUN/Creatinine ratio      10 - 24   18    Sodium      134 - 144 mmol/L   145 (H)    Potassium      3.5 - 5.2 mmol/L   4.7    Chloride      96 - 106 mmol/L   102    CO2      20 - 29 mmol/L   22    Calcium      8.6 - 10.2 mg/dL   9.8    Protein, total      6.0 - 8.5 g/dL   7.8    Albumin      3.8 - 4.8 g/dL   4.7    GLOBULIN, TOTAL      1.5 - 4.5 g/dL   3.1    A-G Ratio      1.2 - 2.2   1.5    Bilirubin, total      0.0 - 1.2 mg/dL   0.3    Alk. phosphatase      44 - 121 IU/L   90    AST      0 - 40 IU/L   17    ALT      0 - 44 IU/L   12    Cholesterol, total      100 - 199 mg/dL    114   Triglyceride      0 - 149 mg/dL    49   HDL Cholesterol      >39 mg/dL    45   VLDL, calculated      5 - 40 mg/dL    12   LDL, calculated      0 - 99 mg/dL    57   Creatinine, urine random      Not Estab. mg/dL 155.5      Microalbumin, urine      Not Estab. ug/mL 6.1      Microalbumin/Creat. Ratio      0 - 29 mg/g creat 4      Hemoglobin A1c, (calculated)      4.8 - 5.6 %  8.1 (H)     Estimated average glucose      mg/dL  186         Assessment/Plan:     1. Type II or unspecified type diabetes mellitus without mention of complication, uncontrolled [facility] his most recent Hgb A1c was 8.1% in 12/22 down from 8.9% in 6/22 down from 12.3% in 11/21 up from 11.3% in 10/21 down from 11.5% in 5/21 up from 11% in 11/20 up from 10.2% in 6/20 down from 11% in 2/20 down from 12% in 6/19 up from 9.4% in 10/18 up from 9.2% in 2/18 down from 9.9% in 9/17 up from 9.4% in 3/17 down from 10.6% in 10/16 up from 9.9% in 4/16 down from 11.4% in 1/16 up from 10.2% in 9/15 up from 10% in 1/15. A1c best in 8 years with starting marcus and dosing 3 times daily. Will give less with lunch to avoid lows by dinner that are causing him to skip this dose. Will repeat an A1c in 6 weeks to see if it's under 8% and if so, will provide endocrine clearance to proceed with left hip replacement. - take 70/30 insulin 50 units before breakfast, 15 units before lunch and 40 units before dinner + 5 units for every 50 mg/dl above 150 mg/dl  - cont metformin  mg tabs 2 tabs bid  - check bs 3 times daily   - foot exam done 6/22  - optho UTD 11/19  - microalbumin nl 12/22  - check A1c and bmp prior to next visit  - check A1c in 6 weeks  - check Hgb A1c, cmp, and microalbumin in 12/23        2. Unspecified essential hypertension: his BP was at goal < 140/90.    - cont diltiazem 360 mg daily  - cont losartan/hct 100/12.5 mg daily  - cont hctz 12.5 mg daily  - cont lasix 40 mg daily  - monitor home blood pressure readings        3. Unspecified vitamin D deficiency: Level was 28.1 in 9/15 and with taking 1000 units daily up to 37 in 1/16. Down to 29 in 10/16 so increased to 2000 units daily and up to 41 in 3/17 and in 2/18. Down to 33.9 in 6/19 and 51 in 6/20 and 54 in 5/21 and 50 in 6/22   - cont vitamin D 2000 units daily  - check Vitamin D 25-OH level prior to next visit        4. Hyperlipidemia: Given DM, Goal LDL < 100, non-HDL < 130, and TG < 150. LDL 79 in 1/15 and 68 in 9/15 and 70 in 4/16 and 94 in 3/17 not on meds. Up to 100 in 2/18 and 103 in 10/18 and started on lipitor 10 mg in summer 2019 and down to 59 in 2/20 and in 11/20 and 53 in 11/21 and 57 in 12/22.  - cont lipitor 10 mg daily  - check lipids in 12/23    Patient Instructions   1) Your Hemoglobin A1c (3 month test of blood sugar) is 8.1% down from 8.9% down from 12.3% and again is the best it's been in 8 years. We'll work to get this under 8% to have hip surgery. Keep up the good work. 2) BUN and creatinine are markers of kidney function. Your values are normal.    3) ALT and AST are markers of liver function. Your values are normal.    4) Your blood pressure and cholesterol and urine are normal.    5) Dose your 70/30 insulin 50 units before breakfast, 15 units before lunch and 40 units before dinner and adjust your dose based on the scale below:    Blood sugar  Insulin dose          Less than 90  Eat first, take 5 units less       Normal dose as above    151-200  Add 5 units      201-250  Add 10 units     251-300  Add 15 units      301-350  Add 20 units      351-400  Add 25 units  401-450  Add 30 units  451-500  Add 35 units  Over 500  Add 40 units    6) Repeat your A1c in 6 weeks and I'll let you know the results to see if you are under 8% at that time. Follow-up and Dispositions    Return in about 6 months (around 6/6/2023).                Copy sent to:  Jaya Waggoner MD as PCP - General (Family Medicine)  Margie Yates MD (Orthopedic Surgery)

## 2023-01-14 LAB
EST. AVERAGE GLUCOSE BLD GHB EST-MCNC: 177 MG/DL
HBA1C MFR BLD: 7.8 % (ref 4.8–5.6)

## 2023-01-15 ENCOUNTER — TELEPHONE (OUTPATIENT)
Dept: ENDOCRINOLOGY | Age: 68
End: 2023-01-15

## 2023-01-16 NOTE — TELEPHONE ENCOUNTER
Patient notified of message per Dr. Vladimir Blandon and voiced understanding of what was read to them. Pt stated he has an appt on 01/19/2023.

## 2023-01-16 NOTE — TELEPHONE ENCOUNTER
Please call him with his results and let him know I sent him the following message in a letter: Your Hemoglobin A1c (3 month test of blood sugar) is 7.8% down from 8.1% at last check and now is at goal under 8% so you are fine to proceed with hip surgery with Dr. Theodore Flowers and I will forward him a copy of this result. Please follow up with his office to try and schedule surgery as soon as possible.

## 2023-01-17 DIAGNOSIS — E11.65 UNCONTROLLED TYPE 2 DIABETES MELLITUS WITH HYPERGLYCEMIA (HCC): ICD-10-CM

## 2023-01-27 ENCOUNTER — DOCUMENTATION ONLY (OUTPATIENT)
Dept: FAMILY MEDICINE CLINIC | Age: 68
End: 2023-01-27

## 2023-01-27 ENCOUNTER — OFFICE VISIT (OUTPATIENT)
Dept: FAMILY MEDICINE CLINIC | Age: 68
End: 2023-01-27
Payer: MEDICARE

## 2023-01-27 VITALS
HEIGHT: 70 IN | BODY MASS INDEX: 32.56 KG/M2 | TEMPERATURE: 97.5 F | RESPIRATION RATE: 16 BRPM | WEIGHT: 227.4 LBS | DIASTOLIC BLOOD PRESSURE: 62 MMHG | OXYGEN SATURATION: 95 % | HEART RATE: 68 BPM | SYSTOLIC BLOOD PRESSURE: 119 MMHG

## 2023-01-27 DIAGNOSIS — Z01.811 PRE-OP CHEST EXAM: Primary | ICD-10-CM

## 2023-01-27 DIAGNOSIS — I10 ESSENTIAL HYPERTENSION, BENIGN: ICD-10-CM

## 2023-01-27 DIAGNOSIS — E11.9 TYPE 2 DIABETES MELLITUS WITHOUT COMPLICATION, WITH LONG-TERM CURRENT USE OF INSULIN (HCC): ICD-10-CM

## 2023-01-27 DIAGNOSIS — Z79.4 TYPE 2 DIABETES MELLITUS WITHOUT COMPLICATION, WITH LONG-TERM CURRENT USE OF INSULIN (HCC): ICD-10-CM

## 2023-01-27 DIAGNOSIS — M25.552 LEFT HIP PAIN: ICD-10-CM

## 2023-01-27 RX ORDER — PHENYLEPHRINE HCL 10 MG
TABLET ORAL
COMMUNITY

## 2023-01-27 NOTE — PROGRESS NOTES
HPI  Samantha Dodge is a 69 y.o. male who is for preop left hip surgery. He is looking forward to this, left hip has been bothering him for quite a while. He has recently managed to get his A1c less than 8.0. He has done this with the help of his continuous glucometer which he is finding very helpful. He estimates he has had 2 low blood sugars in the last month into the 50s    PMHx:  Past Medical History:   Diagnosis Date    Arthritis of knee     Elevated PSA     Pneumonia 2007    Type II or unspecified type diabetes mellitus without mention of complication, uncontrolled late 1990s    Unspecified essential hypertension     Unspecified vitamin D deficiency        Meds:   Current Outpatient Medications   Medication Sig Dispense Refill    cinnamon bark (Cinnamon) 500 mg cap Take  by mouth. tamsulosin (FLOMAX) 0.4 mg capsule Take 0.4 mg by mouth daily. celecoxib (CELEBREX) 200 mg capsule       insulin nph-regular human rec (NovoLIN 70-30 FlexPen U-100) 100 unit/mL (70-30) inpn Inject 50 units before breakfast, 15 units before lunch and 40 units before dinner + 5 units for every 50 mg/dl above 150 mg/dl--max 150 units/day--dispense relion if brand novolin is not covered--Dose change 12/06/22--updated med list--did not send prescription to the pharmacy 45 mL 11    atorvastatin (LIPITOR) 10 mg tablet Take 1 tablet by mouth once daily 90 Tablet 3    losartan-hydroCHLOROthiazide (HYZAAR) 100-12.5 mg per tablet Take 1 tablet by mouth once daily 90 Tablet 3    carvediloL (COREG) 6.25 mg tablet TAKE 1 TABLET BY MOUTH TWICE DAILY WITH MEALS 60 Tablet 3    nutritional supplement-fiber (Wayna Casiano) liqd Take  by mouth. glucosamine-chondroitin (ARTHX) 500-400 mg cap Take 1 Capsule by mouth daily. OTHER daily. Stop pain. omega 3-DHA-EPA-fish oil 1,000 mg (120 mg-180 mg) capsule       hydroCHLOROthiazide (HYDRODIURIL) 12.5 mg tablet Take 1 Tablet by mouth daily.  90 Tablet 3 mv,Ca,min/iron/FA/guarana/caff (ONE-A-DAY ENERGY PO) Take  by mouth daily. furosemide (LASIX) 40 mg tablet Take 1 Tablet by mouth daily. Delete the 20 mg dose from profile 90 Tablet 3    amLODIPine (NORVASC) 10 mg tablet Take 1 Tablet by mouth daily. 90 Tablet 3    metFORMIN ER (GLUCOPHAGE XR) 500 mg tablet TAKE 4 TABLETS BY MOUTH ONCE DAILY 360 Tablet 3    dilTIAZem ER (TIAZAC) 360 mg capsule Take 1 capsule by mouth once daily 90 Capsule 3    Insulin Needles, Disposable, (BD Ultra-Fine Mini Pen Needle) 31 gauge x 3/16\" ndle Use as directed 3 times daily 100 Pen Needle 11    multivitamin, tx-iron-ca-min (THERA-M w/ IRON) 9 mg iron-400 mcg tab tablet Take 1 Tablet by mouth daily. cyanocobalamin, vitamin B-12, (VITAMIN B-12 PO) Take 5,000 mcg by mouth daily. cholecalciferol (VITAMIN D3) (1000 Units /25 mcg) tablet Take  by mouth daily. acetaminophen (TYLENOL) 650 mg TbER Take 650 mg by mouth every eight (8) hours. aspirin 81 mg chewable tablet Take 81 mg by mouth daily. Allergies: Allergies   Allergen Reactions    Jardiance [Empagliflozin] Other (comments)     Yeast infection       Smoker:  Social History     Tobacco Use   Smoking Status Never   Smokeless Tobacco Never       ETOH:   Social History     Substance and Sexual Activity   Alcohol Use Not Currently    Comment: beer once a year       FH:   Family History   Problem Relation Age of Onset    Diabetes Mother     Stroke Father 79    Diabetes Maternal Grandmother        ROS:   As listed in HPI. In addition:  Constitutional:   No headache, fever, fatigue, weight loss or weight gain      Cardiac:    No chest pain      Resp:   No cough or shortness of breath      Neuro   No loss of consciousness, dizziness, seizures      Physical Exam:  Blood pressure 119/62, pulse 68, temperature 97.5 °F (36.4 °C), temperature source Temporal, resp. rate 16, height 5' 10\" (1.778 m), weight 227 lb 6.4 oz (103.1 kg), SpO2 95 %.   GEN: No apparent distress. Alert and oriented and responds to all questions appropriately. NEUROLOGIC:  No focal neurologic deficits. Strength and sensation grossly intact. Coordination and gait grossly intact. EXT: Well perfused. No edema. SKIN: No obvious rashes. Lungs clear to auscultation bilaterally  CV regular rate rhythm no murmur  HEENT Mallampati 2 airway       Assessment and Plan     Preop exam  Capable of greater than 4 METs  Should be low risk for procedure    Diabetes  Should be able to take all of his medication as prescribed day prior to surgery. I do have a question as to what he should do with his 70/30 insulin on the morning of procedure when he will be n.p.o. He typically eats a low-carb breakfast consisting of de la fuente and eggs but sometimes has a slice or 2 of toast   I wondered if he should take a slightly lower dose of insulin on the day of procedure. I encouraged him to reach out to his endocrinologist for advice on this    Hypertension  Well-controlled    Lab work should be sufficiently up-to-date. Will be getting prior to admission testing as well        ICD-10-CM ICD-9-CM    1. Pre-op chest exam  Z01.811 V72.82       2. Type 2 diabetes mellitus without complication, with long-term current use of insulin (HCC)  E11.9 250.00     Z79.4 V58.67       3. Essential hypertension, benign  I10 401.1       4. Left hip pain  M25.552 719.45           AVS given.  Pt expressed understanding of instructions

## 2023-01-27 NOTE — PROGRESS NOTES
Health Maintenance Due   Topic Date Due    Shingles Vaccine (1 of 2) Never done    DTaP/Tdap/Td series (2 - Td or Tdap) 02/24/2020    COVID-19 Vaccine (4 - Booster for Moderna series) 02/05/2022     1. \"Have you been to the ER, urgent care clinic since your last visit? Hospitalized since your last visit? \" No    2. \"Have you seen or consulted any other health care providers outside of the [address] [place] [address] since your last visit? \"  Yes. Ortho      3. For patients aged 39-70: Has the patient had a colonoscopy / FIT/ Cologuard? Yes - Care Gap present. Most recent result on file      If the patient is female:    4. For patients aged 41-77: Has the patient had a mammogram within the past 2 years? NA - based on age or sex      11. For patients aged 21-65: Has the patient had a pap smear? NA - based on age or sex    Do you have an [address] in place in the event that you have a healthcare crisis that could impact your decision making as it pertains to your health? NO    Would you like information about Advance Care Planning? NO    Information given.  NO

## 2023-01-30 ENCOUNTER — HOSPITAL ENCOUNTER (OUTPATIENT)
Dept: PREADMISSION TESTING | Age: 68
Discharge: HOME | End: 2023-01-30
Attending: ORTHOPAEDIC SURGERY
Payer: MEDICARE

## 2023-01-30 VITALS
BODY MASS INDEX: 32.79 KG/M2 | OXYGEN SATURATION: 99 % | HEIGHT: 70 IN | RESPIRATION RATE: 18 BRPM | HEART RATE: 63 BPM | WEIGHT: 229.06 LBS | SYSTOLIC BLOOD PRESSURE: 133 MMHG | TEMPERATURE: 97.9 F | DIASTOLIC BLOOD PRESSURE: 54 MMHG

## 2023-01-30 LAB
ABO + RH BLD: NORMAL
ALBUMIN SERPL-MCNC: 4.1 G/DL (ref 3.5–5)
ALBUMIN/GLOB SERPL: 0.9 (ref 1.1–2.2)
ALP SERPL-CCNC: 99 U/L (ref 45–117)
ALT SERPL-CCNC: 16 U/L (ref 12–78)
ANION GAP SERPL CALC-SCNC: 4 MMOL/L (ref 5–15)
APPEARANCE UR: CLEAR
AST SERPL-CCNC: 12 U/L (ref 15–37)
BACTERIA URNS QL MICRO: NEGATIVE /HPF
BILIRUB SERPL-MCNC: 0.4 MG/DL (ref 0.2–1)
BILIRUB UR QL: NEGATIVE
BLOOD GROUP ANTIBODIES SERPL: NORMAL
BUN SERPL-MCNC: 25 MG/DL (ref 6–20)
BUN/CREAT SERPL: 25 (ref 12–20)
CALCIUM SERPL-MCNC: 9.7 MG/DL (ref 8.5–10.1)
CHLORIDE SERPL-SCNC: 104 MMOL/L (ref 97–108)
CO2 SERPL-SCNC: 29 MMOL/L (ref 21–32)
COLOR UR: ABNORMAL
CREAT SERPL-MCNC: 1.02 MG/DL (ref 0.7–1.3)
EPITH CASTS URNS QL MICRO: ABNORMAL /LPF
ERYTHROCYTE [DISTWIDTH] IN BLOOD BY AUTOMATED COUNT: 12.3 % (ref 11.5–14.5)
EST. AVERAGE GLUCOSE BLD GHB EST-MCNC: 177 MG/DL
GLOBULIN SER CALC-MCNC: 4.5 G/DL (ref 2–4)
GLUCOSE SERPL-MCNC: 85 MG/DL (ref 65–100)
GLUCOSE UR STRIP.AUTO-MCNC: NEGATIVE MG/DL
HBA1C MFR BLD: 7.8 % (ref 4–5.6)
HCT VFR BLD AUTO: 42.4 % (ref 36.6–50.3)
HGB BLD-MCNC: 13.8 G/DL (ref 12.1–17)
HGB UR QL STRIP: NEGATIVE
HYALINE CASTS URNS QL MICRO: ABNORMAL /LPF (ref 0–2)
INR PPP: 1 (ref 0.9–1.1)
KETONES UR QL STRIP.AUTO: NEGATIVE MG/DL
LEUKOCYTE ESTERASE UR QL STRIP.AUTO: ABNORMAL
MCH RBC QN AUTO: 31 PG (ref 26–34)
MCHC RBC AUTO-ENTMCNC: 32.5 G/DL (ref 30–36.5)
MCV RBC AUTO: 95.3 FL (ref 80–99)
NITRITE UR QL STRIP.AUTO: NEGATIVE
NRBC # BLD: 0 K/UL (ref 0–0.01)
NRBC BLD-RTO: 0 PER 100 WBC
PH UR STRIP: 6 (ref 5–8)
PLATELET # BLD AUTO: 294 K/UL (ref 150–400)
PMV BLD AUTO: 10.5 FL (ref 8.9–12.9)
POTASSIUM SERPL-SCNC: 4.7 MMOL/L (ref 3.5–5.1)
PROT SERPL-MCNC: 8.6 G/DL (ref 6.4–8.2)
PROT UR STRIP-MCNC: NEGATIVE MG/DL
PROTHROMBIN TIME: 10.1 SEC (ref 9–11.1)
RBC # BLD AUTO: 4.45 M/UL (ref 4.1–5.7)
RBC #/AREA URNS HPF: ABNORMAL /HPF (ref 0–5)
SODIUM SERPL-SCNC: 137 MMOL/L (ref 136–145)
SP GR UR REFRACTOMETRY: 1.02
SPECIMEN EXP DATE BLD: NORMAL
UA: UC IF INDICATED,UAUC: ABNORMAL
UROBILINOGEN UR QL STRIP.AUTO: 0.2 EU/DL (ref 0.2–1)
WBC # BLD AUTO: 7.2 K/UL (ref 4.1–11.1)
WBC URNS QL MICRO: ABNORMAL /HPF (ref 0–4)

## 2023-01-30 PROCEDURE — 80053 COMPREHEN METABOLIC PANEL: CPT

## 2023-01-30 PROCEDURE — 81001 URINALYSIS AUTO W/SCOPE: CPT

## 2023-01-30 PROCEDURE — 86900 BLOOD TYPING SEROLOGIC ABO: CPT

## 2023-01-30 PROCEDURE — 85027 COMPLETE CBC AUTOMATED: CPT

## 2023-01-30 PROCEDURE — 85610 PROTHROMBIN TIME: CPT

## 2023-01-30 PROCEDURE — 93005 ELECTROCARDIOGRAM TRACING: CPT

## 2023-01-30 PROCEDURE — 36415 COLL VENOUS BLD VENIPUNCTURE: CPT

## 2023-01-30 PROCEDURE — 83036 HEMOGLOBIN GLYCOSYLATED A1C: CPT

## 2023-01-30 RX ORDER — HUMAN INSULIN 100 [IU]/ML
INJECTION, SUSPENSION SUBCUTANEOUS
Qty: 45 ML | Refills: 11 | Status: SHIPPED | OUTPATIENT
Start: 2023-01-30

## 2023-01-30 RX ORDER — CHOLECALCIFEROL TAB 125 MCG (5000 UNIT) 125 MCG
5000 TAB ORAL DAILY
COMMUNITY

## 2023-01-30 NOTE — TELEPHONE ENCOUNTER
Per 12/06/22: - take 70/30 insulin 50 units before breakfast, 15 units before lunch and 40 units before dinner + 5 units for every 50 mg/dl above 150 mg/dl

## 2023-01-30 NOTE — PERIOP NOTES
[facility]  Joint/Spine Preoperative Instructions        Surgery Date 2/6/23          Time of Arrival to be called at [phone]     1. On the day of your surgery, please report to the Surgical Services Registration Desk and sign in at your designated time. The Surgery Center is located to the right of the Emergency Room. 2. You must have someone with you to drive you home. You should not drive a car for 24 hours following surgery. Please make arrangements for a friend or family member to stay with you for the first 24 hours after your surgery. 3. No food after midnight. Medications morning of surgery should be taken with a sip of water. Please follow pre-surgery drink instructions that were given at your Pre Admission Testing appointment. 4. We recommend you do not drink any alcoholic beverages for 24 hours before and after your surgery. 5. Contact your surgeons office for instructions on the following medications: non-steroidal anti-inflammatory drugs (i.e. Advil, Aleve), vitamins, and supplements. (Some surgeons will want you to stop these medications prior to surgery and others may allow you to take them)  **If you are currently taking Plavix, Coumadin, Aspirin and/or other blood-thinning agents, contact your surgeon for instructions. ** Your surgeon will partner with the physician prescribing these medications to determine if it is safe to stop or if you need to continue taking. Please do not stop taking these medications without instructions from your surgeon    6. Wear comfortable clothes. Wear glasses instead of contacts. Do not bring any money or jewelry. Please bring picture ID, insurance card, and any prearranged co-payment or hospital payment. Do not wear make-up, particularly mascara the morning of your surgery. Do not wear nail polish, particularly if you are having foot /hand surgery.   Wear your hair loose or down, no ponytails, buns, wei pins or clips. All body piercings must be removed. Please shower with antibacterial soap for three consecutive days before and on the morning of surgery, but do not apply any lotions, powders or deodorants after the shower on the day of surgery. Please use a fresh towels after each shower. Please sleep in clean clothes and change bed linens the night before surgery. Please do not shave for 48 hours prior to surgery. Shaving of the face is acceptable. 7. You should understand that if you do not follow these instructions your surgery may be cancelled. If your physical condition changes (I.e. fever, cold or flu) please contact your surgeon as soon as possible. 8. It is important that you be on time. If a situation occurs where you may be late, please call [phone] (OR Holding Area). 9. If you have any questions and or problems, please call [phone] (Pre-admission Testing). 10. Your surgery time may be subject to change. You will receive a phone call the evening prior with your time of arrival.    11.  If having outpatient surgery, you must have someone to drive you here, stay with you during the duration of your stay, and to drive you home at time of discharge. 12. The following link is for the educational video for patients and/or families. http://[facility]/. [email or web address]    Special Instructions: Hold vitamins, NSAIDs (celebrex), aspirin and other blood thinners 5 days prior to surgery per Dr. Vinny Weber instructions. TAKE ALL MEDICATIONS THE DAY OF SURGERY EXCEPT: vitamin D, vitamin b-12, cinnamon bark, glucosamine, multivitamin, fish oil, aspirin, celecoxib (celebrex), diabetic medication. May take other medications with a sip of water. I understand a pre-operative phone call will be made to verify my surgery time.   In the event that I am not available, I give permission for a message to be left on my answering service and/or with another person?   yes         ___________________      __________   _________    (Signature of Patient)             (Witness)                (Date and Time)

## 2023-01-30 NOTE — PROGRESS NOTES
Patient attended the Joint Replacement Education Class at [facility]. The content of the class was presented using a power point presentation specific for patients undergoing hip and knee replacement surgery. The [facility] Joint Replacement Education Handbook was given to the patient. Preparing for surgery, day of surgery routine and expectations, discharge process and help at home expectations, nutrition,medications, infection control, pain management, DVT prevention, ice therapy and safety were reviewed in class. Opportunity for questions provided, patient verbalized understanding of instructions.

## 2023-01-30 NOTE — PERIOP NOTES
Hibiclens/Chlorhexidine    Preventing Infections Before and After - Your Surgery    IMPORTANT INSTRUCTIONS    Please read and follow these instructions carefully. If you are unable to comply with the below instructions your procedure will be cancelled. Every Night for Three (3) nights before your surgery:  Shower with an antibacterial soap, such as Dial, or the soap provided at your preassessment appointment. A shower is better than a bath for cleaning your skin. If needed, ask someone to help you reach all areas of your body. Dont forget to clean your belly button with every shower. The night before your surgery: If you lose your Hibiclens/chlorhexidine please contact surgery center or you can purchase it at a local pharmacy  On the night before your surgery, shower with an antibacterial soap, such as Dial, or the soap provided at your preassessment appointment. With one packet of Hibiclens/Chlorhexidine in hand, turn water off. Apply Hibiclens antiseptic skin cleanser with a clean, freshly washed washcloth. Gently apply to your body from chin to toes (except the genital area) and especially the area(s) where your incision(s) will be. Leave Hibiclens/Chlorhexidine on your skin for at least 20 seconds. CAUTION: If needed, Hibiclens/chlorhexidine may be used to clean the folds of skin of the legs (such as in the area of the groin) and on your buttocks and hips. However, do not use Hibiclens/Chlorhexidine above the neck or in the genital area (your bottom) or put inside any area of your body. Turn the water back on and rinse. Dry gently with a clean, freshly washed towel. After your shower, do not use any powder, deodorant, perfumes or lotion. Use clean, freshly washed towels and washcloths every time you shower. Wear clean, freshly washed pajamas to bed the night before surgery. Sleep on clean, freshly washed sheets. Do not allow pets to sleep in your bed with you.         The Morning of your surgery:  Shower again thoroughly with an antibacterial soap, such as Dial or the soap provided at your preassessment appointment. If needed, ask someone for help to reach all areas of your body. Dont forget to clean your belly button! Rinse. Dry gently with a clean, freshly washed towel. After your shower, do not use any powder, deodorant, perfumes or lotion prior to surgery. Put on clean, freshly washed clothing. Tips to help prevent infections after your surgery:  Protect your surgical wound from germs:  Hand washing is the most important thing you and your caregivers can do to prevent infections. Keep your bandage clean and dry! Do not touch your surgical wound. Use clean, freshly washed towels and washcloths every time you shower; do not share bath linens with others. Until your surgical wound is healed, wear clothing and sleep on bed linens each day that are clean and freshly washed. Do not allow pets to sleep in your bed with you or touch your surgical wound. Do not smoke - smoking delays wound healing. This may be a good time to stop smoking. If you have diabetes, it is important for you to manage your blood sugar levels properly before your surgery as well as after your surgery. Poorly managed blood sugar levels slow down wound healing and prevent you from healing completely. If you lose your Hibiclens/chlorhexidine, please call the [facility], or it is available for purchase at your pharmacy.                ___________________      ___________________      ________________  (Signature of Patient)          (Witness)                   (Date and Time)

## 2023-01-30 NOTE — PERIOP NOTES

## 2023-01-30 NOTE — PERIOP NOTES
BUN level 25. Faxed lab results to PCP to make aware. Waiting for lab confirmation.      Fax confirmed

## 2023-01-31 LAB
ATRIAL RATE: 57 BPM
BACTERIA SPEC CULT: NORMAL
CALCULATED P AXIS, ECG09: 59 DEGREES
CALCULATED R AXIS, ECG10: 32 DEGREES
CALCULATED T AXIS, ECG11: 25 DEGREES
DIAGNOSIS, 93000: NORMAL
P-R INTERVAL, ECG05: 194 MS
Q-T INTERVAL, ECG07: 402 MS
QRS DURATION, ECG06: 86 MS
QTC CALCULATION (BEZET), ECG08: 391 MS
SERVICE CMNT-IMP: NORMAL
SERVICE CMNT-IMP: NORMAL
VENTRICULAR RATE, ECG03: 57 BPM

## 2023-01-31 NOTE — ADVANCED PRACTICE NURSE
PAT Nurse Practitioner   Pre-Operative Chart Review/Assessment:-ORTHOPEDIC/NEUROSURGICAL SPINE                Patient Name:  Ayden Sampson                                                           Age:   69 y.o.    :  1955     Today's Date:  2023     Date of PAT:   23      Date of Surgery:    2023      Procedure(s):  Left  Total Hip Arthroplasty     Surgeon:   Nataliya Funes     Primary Care Provider:    Dr. Vinicius Carvalho                    PLAN:      1)  Cardiac Clearance:  Not requested        2)  Program for Diabetes Health Consult:  Not indicated-A1C 7.8 (endocrinologist is Dr. Diana Lang)       3)  Sleep Apnea evaluation:   STOP BANG Score 3;  Snoring-denies, Apnea-denies               4) Treatment for MRSA/MSSA:  Negative       5) Additional Concerns:  T2DM uncontrolled, HTN                Vital Signs:         Visit Vitals  BP (!) 133/54 (BP 1 Location: Left upper arm, BP Patient Position: At rest;Sitting)   Pulse 63   Temp 97.9 °F (36.6 °C)   Resp 18   Ht 5' 10\" (1.778 m)   Wt 103.9 kg (229 lb 0.9 oz)   SpO2 99%   BMI 32.87 kg/m²                        ____________________________________________  PAST MEDICAL HISTORY  Past Medical History:   Diagnosis Date    Arthritis of knee     Elevated PSA     Pneumonia     Type II or unspecified type diabetes mellitus without mention of complication, uncontrolled late 1990s    Unspecified essential hypertension     Unspecified vitamin D deficiency       ____________________________________________  PAST SURGICAL HISTORY  Past Surgical History:   Procedure Laterality Date    HX BACK SURGERY      HX CYST INCISION AND DRAINAGE      chest wall boil    HX HERNIA REPAIR      bilateral    HX ORTHOPAEDIC      left 5th toe surgery    HX ORTHOPAEDIC      right elbow surgery    HX ORTHOPAEDIC  2019    left carpal tunnel and ulnar nerve release    HX SEPTOPLASTY        ____________________________________________  HOME MEDICATIONS    Current Outpatient Medications Medication Sig    cholecalciferol (Vitamin D3) (5000 Units/125 mcg) tab tablet Take 5,000 Units by mouth daily. cinnamon bark 500 mg cap Take  by mouth. celecoxib (CELEBREX) 200 mg capsule     atorvastatin (LIPITOR) 10 mg tablet Take 1 tablet by mouth once daily    losartan-hydroCHLOROthiazide (HYZAAR) 100-12.5 mg per tablet Take 1 tablet by mouth once daily    carvediloL (COREG) 6.25 mg tablet TAKE 1 TABLET BY MOUTH TWICE DAILY WITH MEALS    glucosamine-chondroitin (ARTHX) 500-400 mg cap Take 1 Capsule by mouth daily. omega 3-DHA-EPA-fish oil 1,000 mg (120 mg-180 mg) capsule     hydroCHLOROthiazide (HYDRODIURIL) 12.5 mg tablet Take 1 Tablet by mouth daily. mv,Ca,min/iron/FA/guarana/caff (ONE-A-DAY ENERGY PO) Take  by mouth daily. furosemide (LASIX) 40 mg tablet Take 1 Tablet by mouth daily. Delete the 20 mg dose from profile    amLODIPine (NORVASC) 10 mg tablet Take 1 Tablet by mouth daily. metFORMIN ER (GLUCOPHAGE XR) 500 mg tablet TAKE 4 TABLETS BY MOUTH ONCE DAILY    dilTIAZem ER (TIAZAC) 360 mg capsule Take 1 capsule by mouth once daily    cyanocobalamin, vitamin B-12, (VITAMIN B-12 PO) Take 5,000 mcg by mouth daily. acetaminophen (TYLENOL) 650 mg TbER Take 650 mg by mouth every eight (8) hours. aspirin 81 mg chewable tablet Take 81 mg by mouth daily. insulin nph-regular human rec (NovoLIN 70-30 FlexPen U-100) 100 unit/mL (70-30) inpn Inject 50 units before breakfast, 15 units before lunch and 40 units before dinner + 5 units for every 50 mg/dl above 150 mg/dl--max 150 units/day--dispense relion if brand novolin is not covered    tamsulosin (FLOMAX) 0.4 mg capsule Take 0.4 mg by mouth daily. nutritional supplement-fiber ([place] Luis Angel) liqd Take  by mouth. (Patient not taking: Reported on 1/30/2023)    OTHER daily. Stop pain.  (Patient not taking: Reported on 1/30/2023)    Insulin Needles, Disposable, (BD Ultra-Fine Mini Pen Needle) 31 gauge x 3/16\" ndle Use as directed 3 times daily    multivitamin, tx-iron-ca-min (THERA-M w/ IRON) 9 mg iron-400 mcg tab tablet Take 1 Tablet by mouth daily. (Patient not taking: Reported on 1/30/2023)    cholecalciferol (VITAMIN D3) (1000 Units /25 mcg) tablet Take  by mouth daily.  (Patient not taking: Reported on 1/30/2023)     No current facility-administered medications for this encounter.      ____________________________________________  ALLERGIES  Allergies   Allergen Reactions    Jardiance [Empagliflozin] Other (comments)     Yeast infection      ____________________________________________  SOCIAL HISTORY  Social History     Tobacco Use    Smoking status: Never    Smokeless tobacco: Never   Substance Use Topics    Alcohol use: Not Currently     Comment: beer once a year      ____________________________________________  COVID VACCINATION STATUS:      Internal Administration   First Dose COVID-19, MODERNA BLUE border, Primary or Immunocompromised, (age 18y+), IM, 100 mcg/0.5mL  03/25/2021   Second Dose COVID-19, MODERNA BLUE border, Primary or Immunocompromised, (age 18y+), IM, 100 mcg/0.5mL  05/05/2021      Last COVID Lab No results found for: Shin oYu, RCV2CT, CVD2M, COV2, XPLCVT, [address], [id number], [address], [address], [address] [facility]                   Labs:     Hospital Outpatient Visit on 01/30/2023   Component Date Value Ref Range Status    WBC 01/30/2023 7.2  4.1 - 11.1 K/uL Final    RBC 01/30/2023 4.45  4.10 - 5.70 M/uL Final    HGB 01/30/2023 13.8  12.1 - 17.0 g/dL Final    HCT 01/30/2023 42.4  36.6 - 50.3 % Final    MCV 01/30/2023 95.3  80.0 - 99.0 FL Final    MCH 01/30/2023 31.0  26.0 - 34.0 PG Final    MCHC 01/30/2023 32.5  30.0 - 36.5 g/dL Final    RDW 01/30/2023 12.3  11.5 - 14.5 % Final    PLATELET 30/39/3112 858  150 - 400 K/uL Final    MPV 01/30/2023 10.5  8.9 - 12.9 FL Final    NRBC 01/30/2023 0.0  0  WBC Final    ABSOLUTE NRBC 01/30/2023 0.00  0.00 - 0.01 K/uL Final    INR 01/30/2023 1.0  0.9 - 1.1 Final    A single therapeutic range for Vit K antagonists may not be optimal for all indications - see June, 2008 issue of Chest, American College of Chest Physicians Evidence-Based Clinical Practice Guidelines, 8th Edition. Prothrombin time 01/30/2023 10.1  9.0 - 11.1 sec Final    Color 01/30/2023 YELLOW/STRAW    Final    Color Reference Range: Straw, Yellow or Dark Yellow    Appearance 01/30/2023 CLEAR  CLEAR   Final    Specific gravity 01/30/2023 1.018    Final    pH (UA) 01/30/2023 6.0  5.0 - 8.0   Final    Protein 01/30/2023 Negative  NEG mg/dL Final    Glucose 01/30/2023 Negative  NEG mg/dL Final    Ketone 01/30/2023 Negative  NEG mg/dL Final    Bilirubin 01/30/2023 Negative  NEG   Final    Blood 01/30/2023 Negative  NEG   Final    Urobilinogen 01/30/2023 0.2  0.2 - 1.0 EU/dL Final    Nitrites 01/30/2023 Negative  NEG   Final    Leukocyte Esterase 01/30/2023 TRACE (A)  NEG   Final    UA:UC IF INDICATED 01/30/2023 CULTURE NOT INDICATED BY UA RESULT  CNI   Final    WBC 01/30/2023 0-4  0 - 4 /hpf Final    RBC 01/30/2023 0-5  0 - 5 /hpf Final    Epithelial cells 01/30/2023 FEW  FEW /lpf Final    Epithelial cell category consists of squamous cells and /or transitional urothelial cells. Renal tubular cells, if present, are separately identified as such.     Bacteria 01/30/2023 Negative  NEG /hpf Final    Hyaline cast 01/30/2023 0-2  0 - 2 /lpf Final    Ventricular Rate 01/30/2023 57  BPM Final    Atrial Rate 01/30/2023 57  BPM Final    P-R Interval 01/30/2023 194  ms Final    QRS Duration 01/30/2023 86  ms Final    Q-T Interval 01/30/2023 402  ms Final    QTC Calculation (Bezet) 01/30/2023 391  ms Final    Calculated P Axis 01/30/2023 59  degrees Final    Calculated R Axis 01/30/2023 32  degrees Final    Calculated T Axis 01/30/2023 25  degrees Final    Diagnosis 01/30/2023    Final                    Value:Sinus bradycardia  Cannot rule out Inferior infarct (cited on or before 18-OCT-2010)  When compared with ECG of 18-OCT-2010 11:43,  No significant change was found  Confirmed by ALICE Shah ([id number]) on 1/31/2023 5:35:49 PM      Hemoglobin A1c 01/30/2023 7.8 (A)  4.0 - 5.6 % Final    Comment: NEW METHOD  PLEASE NOTE NEW REFERENCE RANGE  (NOTE)  HbA1C Interpretive Ranges  <5.7              Normal  5.7 - 6.4         Consider Prediabetes  >6.5              Consider Diabetes      Est. average glucose 01/30/2023 177  mg/dL Final    Special Requests: 01/30/2023 NO SPECIAL REQUESTS    Final    Culture result: 01/30/2023 MRSA NOT PRESENT    Final    Culture result: 01/30/2023     Final                    Value:Screening of patient nares for MRSA is for surveillance purposes and, if positive, to facilitate isolation considerations in high risk settings. It is not intended for automatic decolonization interventions per se as regimens are not sufficiently effective to warrant routine use. Crossmatch Expiration 01/30/2023 02/09/2023,2359   Final    ABO/Rh(D) 01/30/2023 O POSITIVE   Final    Antibody screen 01/30/2023 NEG   Final    Sodium 01/30/2023 137  136 - 145 mmol/L Final    Potassium 01/30/2023 4.7  3.5 - 5.1 mmol/L Final    Chloride 01/30/2023 104  97 - 108 mmol/L Final    CO2 01/30/2023 29  21 - 32 mmol/L Final    Anion gap 01/30/2023 4 (A)  5 - 15 mmol/L Final    Glucose 01/30/2023 85  65 - 100 mg/dL Final    BUN 01/30/2023 25 (A)  6 - 20 MG/DL Final    Creatinine 01/30/2023 1.02  0.70 - 1.30 MG/DL Final    BUN/Creatinine ratio 01/30/2023 25 (A)  12 - 20   Final    eGFR 01/30/2023 >60  >60 ml/min/1.73m2 Final    Comment:      Pediatric calculator link: [employer].at. org/professionals/kdoqi/gfr_calculatorped       These results are not intended for use in patients <25years of age. eGFR results are calculated without a race factor using  the 2021 CKD-EPI equation. Careful clinical correlation is recommended, particularly when comparing to results calculated using previous equations.   The CKD-EPI equation is less accurate in patients with extremes of muscle mass, extra-renal metabolism of creatinine, excessive creatine ingestion, or following therapy that affects renal tubular secretion. Calcium 01/30/2023 9.7  8.5 - 10.1 MG/DL Final    Bilirubin, total 01/30/2023 0.4  0.2 - 1.0 MG/DL Final    ALT (SGPT) 01/30/2023 16  12 - 78 U/L Final    AST (SGOT) 01/30/2023 12 (A)  15 - 37 U/L Final    Alk. phosphatase 01/30/2023 99  45 - 117 U/L Final    Protein, total 01/30/2023 8.6 (A)  6.4 - 8.2 g/dL Final    Albumin 01/30/2023 4.1  3.5 - 5.0 g/dL Final    Globulin 01/30/2023 4.5 (A)  2.0 - 4.0 g/dL Final    A-G Ratio 01/30/2023 0.9 (A)  1.1 - 2.2   Final    Special Requests: 01/30/2023 NO SPECIAL REQUESTS    Final    Culture result: 01/30/2023 MRSA NOT PRESENT    Final    Culture result: 01/30/2023     Final                    Value:Screening of patient nares for MRSA is for surveillance purposes and, if positive, to facilitate isolation considerations in high risk settings. It is not intended for automatic decolonization interventions per se as regimens are not sufficiently effective to warrant routine use. Orders Only on 01/17/2023   Component Date Value Ref Range Status    Hemoglobin A1c 01/13/2023 7.8 (A)  4.8 - 5.6 % Final    Comment:          Prediabetes: 5.7 - 6.4           Diabetes: >6.4           Glycemic control for adults with diabetes: <7.0      Estimated average glucose 01/13/2023 177  mg/dL Final        XR Results (most recent):    Results from Abstract encounter on 01/30/20    XR SPINE LUMB MIN 4 V         Skin:   Denies open wounds, cuts, sores, rashes or other areas of concern in PAT assessment. Dian Ganser, NP     2/1/23 EKG from 1/30/23 reviewed with Dr. Rupal Nance, anesthesiologist. No prior EKG for comparison. Planned procedure, PMHx, functional status reviewed.  Pt ok to proceed with planned procedure per Dr. Rupal Nance

## 2023-02-05 NOTE — H&P
Kimmie Rodrigez MD - Adult Reconstruction and Total Joint Replacement     Orthopaedic History and Physical        NAME: Daniela Trujillo       :  1955       MRN:  [id number]    Subjective:   Patient ID: Daniela Trujillo is a 69 y.o. male. Chief Complaint: Pain of the Left Hip  The patient presents today for left hip and arthritis. His A1C has decreased to 7.8, as he has been managing his diabetes well lately. He is ready for surgery. Patient Active Problem List    Diagnosis Date Noted    Type 2 diabetes mellitus 2023    Cubital tunnel syndrome 2019    Vitamin D deficiency 04/15/2015    Type II diabetes mellitus, uncontrolled     Essential hypertension, benign      Past Medical History:   Diagnosis Date    Arthritis of knee     Elevated PSA     Pneumonia     Type II or unspecified type diabetes mellitus without mention of complication, uncontrolled late     Unspecified essential hypertension     Unspecified vitamin D deficiency       Past Surgical History:   Procedure Laterality Date    HX BACK SURGERY      HX CYST INCISION AND DRAINAGE      chest wall boil    HX HERNIA REPAIR      bilateral    HX ORTHOPAEDIC      left 5th toe surgery    HX ORTHOPAEDIC      right elbow surgery    HX ORTHOPAEDIC  2019    left carpal tunnel and ulnar nerve release    HX SEPTOPLASTY        Prior to Admission medications    Medication Sig Start Date End Date Taking? Authorizing Provider   cholecalciferol (Vitamin D3) (5000 Units/125 mcg) tab tablet Take 5,000 Units by mouth daily. Provider, Historical   insulin nph-regular human rec (NovoLIN 70-30 FlexPen U-100) 100 unit/mL (70-30) inpn Inject 50 units before breakfast, 15 units before lunch and 40 units before dinner + 5 units for every 50 mg/dl above 150 mg/dl--max 150 units/day--dispense relion if brand novolin is not covered 23   Cesia Silverman MD   cinnamon bark 500 mg cap Take  by mouth.     Provider, Historical   tamsulosin (FLOMAX) 0.4 mg capsule Take 0.4 mg by mouth daily. Provider, Historical   celecoxib (CELEBREX) 200 mg capsule  12/3/22   Provider, Historical   atorvastatin (LIPITOR) 10 mg tablet Take 1 tablet by mouth once daily 11/28/22   Kelli Herbert MD   losartan-hydroCHLOROthiazide [facility]) 100-12.5 mg per tablet Take 1 tablet by mouth once daily 10/31/22   Kelli Herbert MD   carvediloL (COREG) 6.25 mg tablet TAKE 1 TABLET BY MOUTH TWICE DAILY WITH MEALS 10/31/22   Kelli Herbert MD   nutritional supplement-fiber ([place] Mi) liqd Take  by mouth. Patient not taking: Reported on 1/30/2023    Provider, Historical   glucosamine-chondroitin (ARTHX) 500-400 mg cap Take 1 Capsule by mouth daily. Provider, Historical   OTHER daily. Stop pain. Patient not taking: Reported on 1/30/2023    Provider, Historical   omega 3-DHA-EPA-fish oil 1,000 mg (120 mg-180 mg) capsule     Provider, Historical   hydroCHLOROthiazide (HYDRODIURIL) 12.5 mg tablet Take 1 Tablet by mouth daily. 7/5/22   Izabel Shine MD   mv,Ca,min/iron/FA/guarana/caff (ONE-A-DAY ENERGY PO) Take  by mouth daily. Provider, Historical   furosemide (LASIX) 40 mg tablet Take 1 Tablet by mouth daily. Delete the 20 mg dose from profile 6/6/22   Izabel Shine MD   amLODIPine (NORVASC) 10 mg tablet Take 1 Tablet by mouth daily. 6/6/22   Izabel Shine MD   metFORMIN ER (GLUCOPHAGE XR) 500 mg tablet TAKE 4 TABLETS BY MOUTH ONCE DAILY 5/25/22   Izabel Shine MD   dilTIAZem ER [facility]) 360 mg capsule Take 1 capsule by mouth once daily 2/23/22   Izabel Shine MD   Insulin Needles, Disposable, (BD Ultra-Fine Mini Pen Needle) 31 gauge x 3/16\" ndle Use as directed 3 times daily 1/13/22   Izabel Shine MD   multivitamin, tx-iron-ca-min (THERA-M w/ IRON) 9 mg iron-400 mcg tab tablet Take 1 Tablet by mouth daily.   Patient not taking: Reported on 1/30/2023    Provider, Historical   cyanocobalamin, vitamin B-12, (VITAMIN B-12 PO) Take 5,000 mcg by mouth daily. Provider, Historical   cholecalciferol (VITAMIN D3) (1000 Units /25 mcg) tablet Take  by mouth daily. Patient not taking: Reported on 1/30/2023    Provider, Historical   acetaminophen (TYLENOL) 650 mg TbER Take 650 mg by mouth every eight (8) hours. Provider, Historical   aspirin 81 mg chewable tablet Take 81 mg by mouth daily. Provider, Historical     Allergies   Allergen Reactions    Jardiance [Empagliflozin] Other (comments)     Yeast infection      Social History     Tobacco Use    Smoking status: Never    Smokeless tobacco: Never   Substance Use Topics    Alcohol use: Not Currently     Comment: beer once a year      Family History   Problem Relation Age of Onset    Diabetes Mother     Heart Failure Mother     Stroke Father 79    Diabetes Maternal Grandmother         REVIEW OF SYSTEMS: A comprehensive review of systems was negative except for that written in the HPI. Objective:   Constitutional: He appears stated age. Pt is cooperative and is in no acute distress. Well nourished. Well developed. Body habitus is obese. Estimated body mass index is 32.71 kg/m² as calculated from the following:  Height as of this encounter: 5' 10\". Weight as of this encounter: 228 lb. Gait / Assistive devices: Gait was not assessed. Ambulates with a cane. Eyes: Sclera are nonicteric. Respiratory: No labored breathing. Cardiovascular: No marked edema. Well perfused extremities bilaterally. Skin: No marked skin ulcers. No lymphedema or skin abnormalities. Neurological: No marked sensory loss noted. Grossly neurovascularly intact. Both lower extremities are intact to distal sensory and motor function. Psychiatric: Alert and oriented x3. MUSCULOSKELETAL: Minimal passive range of motion left hip. Range of motion causes pain. Slight hip flexion contracture. Normal limb alignment. No leg length discrepancy.     Imaging:     Previous films of the pelvis were reviewed and revealed evere joint space narrowing of the left hip, with sclerosis, cysts, and prominent osteophytes. Good joint space of the right hip. Assessment:     ICD-10-CM   1. Osteoarthritis of left hip, unspecified osteoarthritis type M16.12   2. Diabetes mellitus of other type without complication, unspecified whether long term insulin use E13.9     Plan: At this time, I recommended a left total hip replacement. Conservative treatments including activity modification, exercise/therapy, medication, and/or injection have not successfully relieved pain. Surgery was discussed at length today. We went over all the pertinent risks, including but not limited to blood clot, infection, nerve/vessel damage, fracture, mechanical failure, other medical/anesthesia related complications, and failure to provide complete relief. We discussed the benefits and alternatives to the procedure. They understand no guarantees can be made about the outcome and they would like to proceed. I discussed the pre-op process and general recovery timeline with the patient. They understand the need for medical clearance.       ÁLVARO Saavedra

## 2023-02-06 ENCOUNTER — ANESTHESIA (OUTPATIENT)
Dept: SURGERY | Age: 68
DRG: 470 | End: 2023-02-06
Payer: MEDICARE

## 2023-02-06 ENCOUNTER — APPOINTMENT (OUTPATIENT)
Dept: GENERAL RADIOLOGY | Age: 68
DRG: 470 | End: 2023-02-06
Attending: ORTHOPAEDIC SURGERY
Payer: MEDICARE

## 2023-02-06 ENCOUNTER — ANESTHESIA EVENT (OUTPATIENT)
Dept: SURGERY | Age: 68
DRG: 470 | End: 2023-02-06
Payer: MEDICARE

## 2023-02-06 ENCOUNTER — HOSPITAL ENCOUNTER (INPATIENT)
Age: 68
LOS: 1 days | Discharge: HOME HEALTH | DRG: 470 | End: 2023-02-07
Attending: ORTHOPAEDIC SURGERY | Admitting: ORTHOPAEDIC SURGERY
Payer: MEDICARE

## 2023-02-06 DIAGNOSIS — Z96.642 STATUS POST LEFT HIP REPLACEMENT: Primary | ICD-10-CM

## 2023-02-06 PROBLEM — Z96.649 S/P HIP REPLACEMENT: Status: ACTIVE | Noted: 2023-02-06

## 2023-02-06 LAB
GLUCOSE BLD STRIP.AUTO-MCNC: 173 MG/DL (ref 65–117)
GLUCOSE BLD STRIP.AUTO-MCNC: 184 MG/DL (ref 65–117)
GLUCOSE BLD STRIP.AUTO-MCNC: 279 MG/DL (ref 65–117)
GLUCOSE BLD STRIP.AUTO-MCNC: 415 MG/DL (ref 65–117)
SERVICE CMNT-IMP: ABNORMAL

## 2023-02-06 PROCEDURE — 77030041034 HC KT HIP PATT -B

## 2023-02-06 PROCEDURE — 77030035236 HC SUT PDS STRATFX BARB J&J -B: Performed by: ORTHOPAEDIC SURGERY

## 2023-02-06 PROCEDURE — 74011000250 HC RX REV CODE- 250: Performed by: NURSE ANESTHETIST, CERTIFIED REGISTERED

## 2023-02-06 PROCEDURE — 77030035643 HC BLD SAW OSC PRECIS STRY -C: Performed by: ORTHOPAEDIC SURGERY

## 2023-02-06 PROCEDURE — 76010000161 HC OR TIME 1 TO 1.5 HR INTENSV-TIER 1: Performed by: ORTHOPAEDIC SURGERY

## 2023-02-06 PROCEDURE — 74011636637 HC RX REV CODE- 636/637: Performed by: PHYSICIAN ASSISTANT

## 2023-02-06 PROCEDURE — 73501 X-RAY EXAM HIP UNI 1 VIEW: CPT

## 2023-02-06 PROCEDURE — 76000 FLUOROSCOPY <1 HR PHYS/QHP: CPT

## 2023-02-06 PROCEDURE — 65270000029 HC RM PRIVATE

## 2023-02-06 PROCEDURE — 74011250637 HC RX REV CODE- 250/637: Performed by: PHYSICIAN ASSISTANT

## 2023-02-06 PROCEDURE — 97165 OT EVAL LOW COMPLEX 30 MIN: CPT

## 2023-02-06 PROCEDURE — 2709999900 HC NON-CHARGEABLE SUPPLY: Performed by: ORTHOPAEDIC SURGERY

## 2023-02-06 PROCEDURE — 74011250636 HC RX REV CODE- 250/636: Performed by: NURSE ANESTHETIST, CERTIFIED REGISTERED

## 2023-02-06 PROCEDURE — 77030036722: Performed by: ORTHOPAEDIC SURGERY

## 2023-02-06 PROCEDURE — 74011000250 HC RX REV CODE- 250: Performed by: PHYSICIAN ASSISTANT

## 2023-02-06 PROCEDURE — 77030003666 HC NDL SPINAL BD -A: Performed by: ORTHOPAEDIC SURGERY

## 2023-02-06 PROCEDURE — 74011250636 HC RX REV CODE- 250/636

## 2023-02-06 PROCEDURE — B41G110 FLUOROSCOPY OF LEFT LOWER EXTREMITY ARTERIES USING LOW OSMOLAR CONTRAST, LASER INTRAOPERATIVE: ICD-10-PCS | Performed by: ORTHOPAEDIC SURGERY

## 2023-02-06 PROCEDURE — 8E0YXBZ COMPUTER ASSISTED PROCEDURE OF LOWER EXTREMITY: ICD-10-PCS | Performed by: ORTHOPAEDIC SURGERY

## 2023-02-06 PROCEDURE — 77030008684 HC TU ET CUF COVD -B: Performed by: ANESTHESIOLOGY

## 2023-02-06 PROCEDURE — 77030028271 HC SRGFL HEMSTAT MTRX KT J&J -C: Performed by: ORTHOPAEDIC SURGERY

## 2023-02-06 PROCEDURE — 77030034479 HC ADH SKN CLSR PRINEO J&J -B: Performed by: ORTHOPAEDIC SURGERY

## 2023-02-06 PROCEDURE — 77030018673: Performed by: ORTHOPAEDIC SURGERY

## 2023-02-06 PROCEDURE — 0SRB02Z REPLACEMENT OF LEFT HIP JOINT WITH METAL ON POLYETHYLENE SYNTHETIC SUBSTITUTE, OPEN APPROACH: ICD-10-PCS | Performed by: ORTHOPAEDIC SURGERY

## 2023-02-06 PROCEDURE — 74011250636 HC RX REV CODE- 250/636: Performed by: ORTHOPAEDIC SURGERY

## 2023-02-06 PROCEDURE — 76210000016 HC OR PH I REC 1 TO 1.5 HR: Performed by: ORTHOPAEDIC SURGERY

## 2023-02-06 PROCEDURE — 76060000033 HC ANESTHESIA 1 TO 1.5 HR: Performed by: ORTHOPAEDIC SURGERY

## 2023-02-06 PROCEDURE — 97162 PT EVAL MOD COMPLEX 30 MIN: CPT

## 2023-02-06 PROCEDURE — 77030031139 HC SUT VCRL2 J&J -A: Performed by: ORTHOPAEDIC SURGERY

## 2023-02-06 PROCEDURE — 2709999900 HC NON-CHARGEABLE SUPPLY

## 2023-02-06 PROCEDURE — 77030026438 HC STYL ET INTUB CARD -A: Performed by: ANESTHESIOLOGY

## 2023-02-06 PROCEDURE — 97530 THERAPEUTIC ACTIVITIES: CPT

## 2023-02-06 PROCEDURE — 77030018723 HC ELCTRD BLD COVD -A: Performed by: ORTHOPAEDIC SURGERY

## 2023-02-06 PROCEDURE — 74011250637 HC RX REV CODE- 250/637: Performed by: ORTHOPAEDIC SURGERY

## 2023-02-06 PROCEDURE — 74011250636 HC RX REV CODE- 250/636: Performed by: ANESTHESIOLOGY

## 2023-02-06 PROCEDURE — 77030036660

## 2023-02-06 PROCEDURE — 82962 GLUCOSE BLOOD TEST: CPT

## 2023-02-06 PROCEDURE — 97535 SELF CARE MNGMENT TRAINING: CPT

## 2023-02-06 PROCEDURE — 74011250636 HC RX REV CODE- 250/636: Performed by: PHYSICIAN ASSISTANT

## 2023-02-06 RX ORDER — LIDOCAINE HYDROCHLORIDE 10 MG/ML
0.1 INJECTION, SOLUTION EPIDURAL; INFILTRATION; INTRACAUDAL; PERINEURAL AS NEEDED
Status: DISCONTINUED | OUTPATIENT
Start: 2023-02-06 | End: 2023-02-06 | Stop reason: HOSPADM

## 2023-02-06 RX ORDER — AMOXICILLIN 250 MG
1 CAPSULE ORAL 2 TIMES DAILY
Status: DISCONTINUED | OUTPATIENT
Start: 2023-02-06 | End: 2023-02-07 | Stop reason: HOSPADM

## 2023-02-06 RX ORDER — ACETAMINOPHEN 500 MG
1000 TABLET ORAL ONCE
Status: COMPLETED | OUTPATIENT
Start: 2023-02-06 | End: 2023-02-06

## 2023-02-06 RX ORDER — KETOROLAC TROMETHAMINE 30 MG/ML
15 INJECTION, SOLUTION INTRAMUSCULAR; INTRAVENOUS EVERY 6 HOURS
Status: DISCONTINUED | OUTPATIENT
Start: 2023-02-06 | End: 2023-02-07 | Stop reason: HOSPADM

## 2023-02-06 RX ORDER — FUROSEMIDE 20 MG/1
20 TABLET ORAL DAILY
Status: DISCONTINUED | OUTPATIENT
Start: 2023-02-07 | End: 2023-02-07 | Stop reason: HOSPADM

## 2023-02-06 RX ORDER — CELECOXIB 200 MG/1
400 CAPSULE ORAL ONCE
Status: COMPLETED | OUTPATIENT
Start: 2023-02-06 | End: 2023-02-06

## 2023-02-06 RX ORDER — FENTANYL CITRATE 50 UG/ML
INJECTION, SOLUTION INTRAMUSCULAR; INTRAVENOUS
Status: COMPLETED
Start: 2023-02-06 | End: 2023-02-06

## 2023-02-06 RX ORDER — HYDROMORPHONE HYDROCHLORIDE 1 MG/ML
.2-.5 INJECTION, SOLUTION INTRAMUSCULAR; INTRAVENOUS; SUBCUTANEOUS
Status: DISCONTINUED | OUTPATIENT
Start: 2023-02-06 | End: 2023-02-06 | Stop reason: HOSPADM

## 2023-02-06 RX ORDER — FENTANYL CITRATE 50 UG/ML
25 INJECTION, SOLUTION INTRAMUSCULAR; INTRAVENOUS
Status: DISCONTINUED | OUTPATIENT
Start: 2023-02-06 | End: 2023-02-06 | Stop reason: HOSPADM

## 2023-02-06 RX ORDER — PROPOFOL 10 MG/ML
INJECTION, EMULSION INTRAVENOUS AS NEEDED
Status: DISCONTINUED | OUTPATIENT
Start: 2023-02-06 | End: 2023-02-06 | Stop reason: HOSPADM

## 2023-02-06 RX ORDER — CEFAZOLIN SODIUM/WATER 2 G/20 ML
SYRINGE (ML) INTRAVENOUS AS NEEDED
Status: DISCONTINUED | OUTPATIENT
Start: 2023-02-06 | End: 2023-02-06 | Stop reason: HOSPADM

## 2023-02-06 RX ORDER — DIPHENHYDRAMINE HCL 12.5MG/5ML
12.5 ELIXIR ORAL
Status: DISCONTINUED | OUTPATIENT
Start: 2023-02-06 | End: 2023-02-07 | Stop reason: HOSPADM

## 2023-02-06 RX ORDER — FAMOTIDINE 20 MG/1
20 TABLET, FILM COATED ORAL 2 TIMES DAILY
Qty: 60 TABLET | Refills: 0 | Status: SHIPPED | OUTPATIENT
Start: 2023-02-06 | End: 2023-02-07 | Stop reason: SDUPTHER

## 2023-02-06 RX ORDER — SODIUM CHLORIDE 0.9 % (FLUSH) 0.9 %
5-40 SYRINGE (ML) INJECTION AS NEEDED
Status: DISCONTINUED | OUTPATIENT
Start: 2023-02-06 | End: 2023-02-07 | Stop reason: HOSPADM

## 2023-02-06 RX ORDER — SODIUM CHLORIDE 0.9 % (FLUSH) 0.9 %
5-40 SYRINGE (ML) INJECTION EVERY 8 HOURS
Status: DISCONTINUED | OUTPATIENT
Start: 2023-02-06 | End: 2023-02-07 | Stop reason: HOSPADM

## 2023-02-06 RX ORDER — ATORVASTATIN CALCIUM 10 MG/1
10 TABLET, FILM COATED ORAL DAILY
Status: DISCONTINUED | OUTPATIENT
Start: 2023-02-07 | End: 2023-02-07 | Stop reason: HOSPADM

## 2023-02-06 RX ORDER — DEXAMETHASONE SODIUM PHOSPHATE 4 MG/ML
INJECTION, SOLUTION INTRA-ARTICULAR; INTRALESIONAL; INTRAMUSCULAR; INTRAVENOUS; SOFT TISSUE AS NEEDED
Status: DISCONTINUED | OUTPATIENT
Start: 2023-02-06 | End: 2023-02-06 | Stop reason: HOSPADM

## 2023-02-06 RX ORDER — ACETAMINOPHEN 325 MG/1
650 TABLET ORAL EVERY 6 HOURS
Status: DISCONTINUED | OUTPATIENT
Start: 2023-02-06 | End: 2023-02-07 | Stop reason: HOSPADM

## 2023-02-06 RX ORDER — NEOSTIGMINE METHYLSULFATE 1 MG/ML
INJECTION, SOLUTION INTRAVENOUS AS NEEDED
Status: DISCONTINUED | OUTPATIENT
Start: 2023-02-06 | End: 2023-02-06 | Stop reason: HOSPADM

## 2023-02-06 RX ORDER — FENTANYL CITRATE 50 UG/ML
50 INJECTION, SOLUTION INTRAMUSCULAR; INTRAVENOUS AS NEEDED
Status: DISCONTINUED | OUTPATIENT
Start: 2023-02-06 | End: 2023-02-06 | Stop reason: HOSPADM

## 2023-02-06 RX ORDER — TAMSULOSIN HYDROCHLORIDE 0.4 MG/1
0.4 CAPSULE ORAL DAILY
Status: DISCONTINUED | OUTPATIENT
Start: 2023-02-07 | End: 2023-02-07 | Stop reason: HOSPADM

## 2023-02-06 RX ORDER — SODIUM CHLORIDE 9 MG/ML
125 INJECTION, SOLUTION INTRAVENOUS CONTINUOUS
Status: DISPENSED | OUTPATIENT
Start: 2023-02-06 | End: 2023-02-07

## 2023-02-06 RX ORDER — TRANEXAMIC ACID 100 MG/ML
INJECTION, SOLUTION INTRAVENOUS AS NEEDED
Status: DISCONTINUED | OUTPATIENT
Start: 2023-02-06 | End: 2023-02-06 | Stop reason: HOSPADM

## 2023-02-06 RX ORDER — FACIAL-BODY WIPES
10 EACH TOPICAL DAILY PRN
Status: DISCONTINUED | OUTPATIENT
Start: 2023-02-08 | End: 2023-02-07 | Stop reason: HOSPADM

## 2023-02-06 RX ORDER — FAMOTIDINE 20 MG/1
20 TABLET, FILM COATED ORAL 2 TIMES DAILY
Status: DISCONTINUED | OUTPATIENT
Start: 2023-02-06 | End: 2023-02-07 | Stop reason: HOSPADM

## 2023-02-06 RX ORDER — POLYETHYLENE GLYCOL 3350 17 G/17G
17 POWDER, FOR SOLUTION ORAL DAILY
Status: DISCONTINUED | OUTPATIENT
Start: 2023-02-07 | End: 2023-02-07 | Stop reason: HOSPADM

## 2023-02-06 RX ORDER — HYDROMORPHONE HYDROCHLORIDE 1 MG/ML
0.5 INJECTION, SOLUTION INTRAMUSCULAR; INTRAVENOUS; SUBCUTANEOUS
Status: ACTIVE | OUTPATIENT
Start: 2023-02-06 | End: 2023-02-07

## 2023-02-06 RX ORDER — LIDOCAINE HYDROCHLORIDE 20 MG/ML
INJECTION, SOLUTION EPIDURAL; INFILTRATION; INTRACAUDAL; PERINEURAL AS NEEDED
Status: DISCONTINUED | OUTPATIENT
Start: 2023-02-06 | End: 2023-02-06 | Stop reason: HOSPADM

## 2023-02-06 RX ORDER — DEXAMETHASONE SODIUM PHOSPHATE 4 MG/ML
10 INJECTION, SOLUTION INTRA-ARTICULAR; INTRALESIONAL; INTRAMUSCULAR; INTRAVENOUS; SOFT TISSUE ONCE
Status: COMPLETED | OUTPATIENT
Start: 2023-02-07 | End: 2023-02-07

## 2023-02-06 RX ORDER — PREGABALIN 150 MG/1
150 CAPSULE ORAL ONCE
Status: COMPLETED | OUTPATIENT
Start: 2023-02-06 | End: 2023-02-06

## 2023-02-06 RX ORDER — CELECOXIB 200 MG/1
200 CAPSULE ORAL 2 TIMES DAILY
Qty: 28 CAPSULE | Refills: 0 | Status: SHIPPED | OUTPATIENT
Start: 2023-02-06 | End: 2023-02-20

## 2023-02-06 RX ORDER — ONDANSETRON 2 MG/ML
4 INJECTION INTRAMUSCULAR; INTRAVENOUS AS NEEDED
Status: DISCONTINUED | OUTPATIENT
Start: 2023-02-06 | End: 2023-02-06 | Stop reason: HOSPADM

## 2023-02-06 RX ORDER — MIDAZOLAM HYDROCHLORIDE 1 MG/ML
1 INJECTION, SOLUTION INTRAMUSCULAR; INTRAVENOUS AS NEEDED
Status: DISCONTINUED | OUTPATIENT
Start: 2023-02-06 | End: 2023-02-06 | Stop reason: HOSPADM

## 2023-02-06 RX ORDER — GLYCOPYRROLATE 0.2 MG/ML
INJECTION INTRAMUSCULAR; INTRAVENOUS AS NEEDED
Status: DISCONTINUED | OUTPATIENT
Start: 2023-02-06 | End: 2023-02-06 | Stop reason: HOSPADM

## 2023-02-06 RX ORDER — CARVEDILOL 6.25 MG/1
6.25 TABLET ORAL 2 TIMES DAILY WITH MEALS
Status: DISCONTINUED | OUTPATIENT
Start: 2023-02-06 | End: 2023-02-07 | Stop reason: HOSPADM

## 2023-02-06 RX ORDER — TRAMADOL HYDROCHLORIDE 50 MG/1
50-100 TABLET ORAL
Qty: 30 TABLET | Refills: 0 | Status: SHIPPED | OUTPATIENT
Start: 2023-02-06 | End: 2023-02-07 | Stop reason: SDUPTHER

## 2023-02-06 RX ORDER — NALOXONE HYDROCHLORIDE 0.4 MG/ML
0.4 INJECTION, SOLUTION INTRAMUSCULAR; INTRAVENOUS; SUBCUTANEOUS AS NEEDED
Status: DISCONTINUED | OUTPATIENT
Start: 2023-02-06 | End: 2023-02-07 | Stop reason: HOSPADM

## 2023-02-06 RX ORDER — SODIUM CHLORIDE, SODIUM LACTATE, POTASSIUM CHLORIDE, CALCIUM CHLORIDE 600; 310; 30; 20 MG/100ML; MG/100ML; MG/100ML; MG/100ML
25 INJECTION, SOLUTION INTRAVENOUS CONTINUOUS
Status: DISCONTINUED | OUTPATIENT
Start: 2023-02-06 | End: 2023-02-06 | Stop reason: HOSPADM

## 2023-02-06 RX ORDER — IBUPROFEN 200 MG
4 TABLET ORAL AS NEEDED
Status: DISCONTINUED | OUTPATIENT
Start: 2023-02-06 | End: 2023-02-07 | Stop reason: HOSPADM

## 2023-02-06 RX ORDER — MIDAZOLAM HYDROCHLORIDE 1 MG/ML
INJECTION, SOLUTION INTRAMUSCULAR; INTRAVENOUS AS NEEDED
Status: DISCONTINUED | OUTPATIENT
Start: 2023-02-06 | End: 2023-02-06 | Stop reason: HOSPADM

## 2023-02-06 RX ORDER — HYDROMORPHONE HYDROCHLORIDE 2 MG/ML
INJECTION, SOLUTION INTRAMUSCULAR; INTRAVENOUS; SUBCUTANEOUS AS NEEDED
Status: DISCONTINUED | OUTPATIENT
Start: 2023-02-06 | End: 2023-02-06 | Stop reason: HOSPADM

## 2023-02-06 RX ORDER — PHENYLEPHRINE HCL IN 0.9% NACL 0.4MG/10ML
SYRINGE (ML) INTRAVENOUS AS NEEDED
Status: DISCONTINUED | OUTPATIENT
Start: 2023-02-06 | End: 2023-02-06 | Stop reason: HOSPADM

## 2023-02-06 RX ORDER — INSULIN LISPRO 100 [IU]/ML
INJECTION, SOLUTION INTRAVENOUS; SUBCUTANEOUS
Status: DISCONTINUED | OUTPATIENT
Start: 2023-02-06 | End: 2023-02-07 | Stop reason: HOSPADM

## 2023-02-06 RX ORDER — ROPIVACAINE HYDROCHLORIDE 5 MG/ML
30 INJECTION, SOLUTION EPIDURAL; INFILTRATION; PERINEURAL ONCE
Status: COMPLETED | OUTPATIENT
Start: 2023-02-06 | End: 2023-02-06

## 2023-02-06 RX ORDER — ONDANSETRON 2 MG/ML
INJECTION INTRAMUSCULAR; INTRAVENOUS AS NEEDED
Status: DISCONTINUED | OUTPATIENT
Start: 2023-02-06 | End: 2023-02-06 | Stop reason: HOSPADM

## 2023-02-06 RX ORDER — DEXMEDETOMIDINE HYDROCHLORIDE 100 UG/ML
INJECTION, SOLUTION INTRAVENOUS AS NEEDED
Status: DISCONTINUED | OUTPATIENT
Start: 2023-02-06 | End: 2023-02-06 | Stop reason: HOSPADM

## 2023-02-06 RX ORDER — KETAMINE HYDROCHLORIDE 100 MG/ML
INJECTION INTRAMUSCULAR; INTRAVENOUS AS NEEDED
Status: DISCONTINUED | OUTPATIENT
Start: 2023-02-06 | End: 2023-02-06 | Stop reason: HOSPADM

## 2023-02-06 RX ORDER — POLYETHYLENE GLYCOL 3350 17 G/17G
17 POWDER, FOR SOLUTION ORAL DAILY
Qty: 7 PACKET | Refills: 0 | Status: SHIPPED | OUTPATIENT
Start: 2023-02-07 | End: 2023-02-07 | Stop reason: SDUPTHER

## 2023-02-06 RX ORDER — TRAMADOL HYDROCHLORIDE 50 MG/1
50-100 TABLET ORAL
Status: DISCONTINUED | OUTPATIENT
Start: 2023-02-06 | End: 2023-02-07 | Stop reason: HOSPADM

## 2023-02-06 RX ORDER — AMOXICILLIN 250 MG
1 CAPSULE ORAL 2 TIMES DAILY
Qty: 14 TABLET | Refills: 0 | Status: SHIPPED | OUTPATIENT
Start: 2023-02-06 | End: 2023-02-07 | Stop reason: SDUPTHER

## 2023-02-06 RX ORDER — ONDANSETRON 2 MG/ML
4 INJECTION INTRAMUSCULAR; INTRAVENOUS
Status: ACTIVE | OUTPATIENT
Start: 2023-02-06 | End: 2023-02-07

## 2023-02-06 RX ORDER — ASPIRIN 81 MG/1
81 TABLET ORAL 2 TIMES DAILY
Status: DISCONTINUED | OUTPATIENT
Start: 2023-02-06 | End: 2023-02-07 | Stop reason: HOSPADM

## 2023-02-06 RX ORDER — ASPIRIN 81 MG/1
81 TABLET ORAL 2 TIMES DAILY
Qty: 60 TABLET | Refills: 0 | Status: SHIPPED | OUTPATIENT
Start: 2023-02-06 | End: 2023-02-07 | Stop reason: SDUPTHER

## 2023-02-06 RX ORDER — DEXTROSE MONOHYDRATE 100 MG/ML
0-250 INJECTION, SOLUTION INTRAVENOUS AS NEEDED
Status: DISCONTINUED | OUTPATIENT
Start: 2023-02-06 | End: 2023-02-07 | Stop reason: HOSPADM

## 2023-02-06 RX ORDER — ROCURONIUM BROMIDE 10 MG/ML
INJECTION, SOLUTION INTRAVENOUS AS NEEDED
Status: DISCONTINUED | OUTPATIENT
Start: 2023-02-06 | End: 2023-02-06 | Stop reason: HOSPADM

## 2023-02-06 RX ORDER — ONDANSETRON 4 MG/1
4 TABLET, ORALLY DISINTEGRATING ORAL
Status: DISCONTINUED | OUTPATIENT
Start: 2023-02-08 | End: 2023-02-07 | Stop reason: HOSPADM

## 2023-02-06 RX ORDER — FENTANYL CITRATE 50 UG/ML
INJECTION, SOLUTION INTRAMUSCULAR; INTRAVENOUS AS NEEDED
Status: DISCONTINUED | OUTPATIENT
Start: 2023-02-06 | End: 2023-02-06 | Stop reason: HOSPADM

## 2023-02-06 RX ADMIN — ROCURONIUM BROMIDE 5 MG: 10 INJECTION INTRAVENOUS at 10:34

## 2023-02-06 RX ADMIN — ACETAMINOPHEN 325MG 650 MG: 325 TABLET ORAL at 14:27

## 2023-02-06 RX ADMIN — Medication 3 AMPULE: at 09:17

## 2023-02-06 RX ADMIN — HYDROMORPHONE HYDROCHLORIDE 0.6 MG: 2 INJECTION, SOLUTION INTRAMUSCULAR; INTRAVENOUS; SUBCUTANEOUS at 10:30

## 2023-02-06 RX ADMIN — FENTANYL CITRATE 25 MCG: 50 INJECTION, SOLUTION INTRAMUSCULAR; INTRAVENOUS at 12:20

## 2023-02-06 RX ADMIN — TRAMADOL HYDROCHLORIDE 50 MG: 50 TABLET ORAL at 14:27

## 2023-02-06 RX ADMIN — KETAMINE HYDROCHLORIDE 20 MG: 100 INJECTION, SOLUTION, CONCENTRATE INTRAMUSCULAR; INTRAVENOUS at 10:46

## 2023-02-06 RX ADMIN — ONDANSETRON HYDROCHLORIDE 4 MG: 2 INJECTION, SOLUTION INTRAMUSCULAR; INTRAVENOUS at 10:19

## 2023-02-06 RX ADMIN — SODIUM CHLORIDE 125 ML/HR: 9 INJECTION, SOLUTION INTRAVENOUS at 17:14

## 2023-02-06 RX ADMIN — SODIUM CHLORIDE, POTASSIUM CHLORIDE, SODIUM LACTATE AND CALCIUM CHLORIDE: 600; 310; 30; 20 INJECTION, SOLUTION INTRAVENOUS at 10:06

## 2023-02-06 RX ADMIN — TRAMADOL HYDROCHLORIDE 100 MG: 50 TABLET ORAL at 21:13

## 2023-02-06 RX ADMIN — ROCURONIUM BROMIDE 10 MG: 10 INJECTION INTRAVENOUS at 10:24

## 2023-02-06 RX ADMIN — TRANEXAMIC ACID 1 G: 100 INJECTION, SOLUTION INTRAVENOUS at 10:18

## 2023-02-06 RX ADMIN — Medication 1 AMPULE: at 17:25

## 2023-02-06 RX ADMIN — LIDOCAINE HYDROCHLORIDE 100 MG: 20 INJECTION, SOLUTION EPIDURAL; INFILTRATION; INTRACAUDAL; PERINEURAL at 10:10

## 2023-02-06 RX ADMIN — FENTANYL CITRATE 100 MCG: 50 INJECTION, SOLUTION INTRAMUSCULAR; INTRAVENOUS at 10:03

## 2023-02-06 RX ADMIN — DEXAMETHASONE SODIUM PHOSPHATE 4 MG: 4 INJECTION, SOLUTION INTRAMUSCULAR; INTRAVENOUS at 10:18

## 2023-02-06 RX ADMIN — SODIUM CHLORIDE, PRESERVATIVE FREE 10 ML: 5 INJECTION INTRAVENOUS at 21:14

## 2023-02-06 RX ADMIN — ASPIRIN 81 MG: 81 TABLET, COATED ORAL at 17:15

## 2023-02-06 RX ADMIN — ACETAMINOPHEN 1000 MG: 500 TABLET ORAL at 09:17

## 2023-02-06 RX ADMIN — SODIUM CHLORIDE, POTASSIUM CHLORIDE, SODIUM LACTATE AND CALCIUM CHLORIDE: 600; 310; 30; 20 INJECTION, SOLUTION INTRAVENOUS at 11:08

## 2023-02-06 RX ADMIN — KETOROLAC TROMETHAMINE 15 MG: 30 INJECTION, SOLUTION INTRAMUSCULAR; INTRAVENOUS at 17:19

## 2023-02-06 RX ADMIN — SENNOSIDES AND DOCUSATE SODIUM 1 TABLET: 50; 8.6 TABLET ORAL at 17:15

## 2023-02-06 RX ADMIN — GLYCOPYRROLATE 0.4 MG: 0.2 INJECTION, SOLUTION INTRAMUSCULAR; INTRAVENOUS at 11:18

## 2023-02-06 RX ADMIN — PROPOFOL 200 MG: 10 INJECTION, EMULSION INTRAVENOUS at 10:10

## 2023-02-06 RX ADMIN — CELECOXIB 400 MG: 200 CAPSULE ORAL at 09:17

## 2023-02-06 RX ADMIN — SODIUM CHLORIDE, PRESERVATIVE FREE 10 ML: 5 INJECTION INTRAVENOUS at 17:25

## 2023-02-06 RX ADMIN — Medication 80 MCG: at 10:54

## 2023-02-06 RX ADMIN — ACETAMINOPHEN 325MG 650 MG: 325 TABLET ORAL at 21:13

## 2023-02-06 RX ADMIN — Medication 2 G: at 10:18

## 2023-02-06 RX ADMIN — PREGABALIN 150 MG: 150 CAPSULE ORAL at 09:18

## 2023-02-06 RX ADMIN — PROPOFOL 100 MG: 10 INJECTION, EMULSION INTRAVENOUS at 10:21

## 2023-02-06 RX ADMIN — HYDROMORPHONE HYDROCHLORIDE 0.2 MG: 2 INJECTION, SOLUTION INTRAMUSCULAR; INTRAVENOUS; SUBCUTANEOUS at 10:35

## 2023-02-06 RX ADMIN — HYDROMORPHONE HYDROCHLORIDE 0.4 MG: 2 INJECTION, SOLUTION INTRAMUSCULAR; INTRAVENOUS; SUBCUTANEOUS at 10:24

## 2023-02-06 RX ADMIN — FAMOTIDINE 20 MG: 20 TABLET, FILM COATED ORAL at 17:15

## 2023-02-06 RX ADMIN — CEFAZOLIN 2 G: 1 INJECTION, POWDER, FOR SOLUTION INTRAMUSCULAR; INTRAVENOUS at 17:16

## 2023-02-06 RX ADMIN — Medication 1 AMPULE: at 21:13

## 2023-02-06 RX ADMIN — CARVEDILOL 6.25 MG: 6.25 TABLET, FILM COATED ORAL at 17:15

## 2023-02-06 RX ADMIN — ROCURONIUM BROMIDE 30 MG: 10 INJECTION INTRAVENOUS at 10:11

## 2023-02-06 RX ADMIN — DEXMEDETOMIDINE HYDROCHLORIDE 10 MCG: 100 INJECTION, SOLUTION, CONCENTRATE INTRAVENOUS at 10:18

## 2023-02-06 RX ADMIN — Medication 5 UNITS: at 17:17

## 2023-02-06 RX ADMIN — MIDAZOLAM HYDROCHLORIDE 2 MG: 1 INJECTION, SOLUTION INTRAMUSCULAR; INTRAVENOUS at 10:03

## 2023-02-06 RX ADMIN — Medication 3 MG: at 11:19

## 2023-02-06 NOTE — PROGRESS NOTES
Problem: Mobility Impaired (Adult and Pediatric)  Goal: *Acute Goals and Plan of Care (Insert Text)  Description: FUNCTIONAL STATUS PRIOR TO ADMISSION: The patient has been using a quad cane recently due to pain/weakness in L hip. Previously he was I with ADLs and took care of his wife who just passed away in August 2022. HOME SUPPORT PRIOR TO ADMISSION: The patient lives alone, 3 steps to enter, tri-level home. He has multiple siblings that live close by and are able to assist. He plans to go home with his sister initially, 1-story home with no steps to enter, until he feels ready to go back home alone. Physical Therapy Goals  Initiated 2/6/2023  1. Patient will move from supine to sit and sit to supine  in bed with supervision/set-up within 7 day(s). 2.  Patient will transfer from bed to chair and chair to bed with supervision/set-up using the least restrictive device within 7 day(s). 3.  Patient will perform sit to stand with supervision/set-up within 7 day(s). 4.  Patient will ambulate with supervision/set-up for 100 feet with the least restrictive device within 7 day(s). 5.  Patient will ascend/descend 7 stairs with 1 handrail(s) with minimal assistance/contact guard assist within 7 day(s). Outcome: Not Progressing Towards Goal    PHYSICAL THERAPY EVALUATION  Patient: Alexandra Lafleur (88 y.o. male)  Date: 2/6/2023  Primary Diagnosis: S/P hip replacement [Z96.649]  Procedure(s) (LRB):  LEFT TOTAL HIP ARTHROPLASTY WITH NAVIGATION ANTERIOR APPROACH (Left) Day of Surgery   Precautions:   Fall, WBAT    ASSESSMENT  Based on the objective data described below, the patient presents with decreased strength in LLE, impaired sitting and standing balance, and difficulty ambulation s/p POD#0 for L THR. Patient was semi-supine in bed, family present, agreeable to work with therapy. Able to come to sit EOB with minimum assist to help move LLE.  He required contact guard assist while sitting EOB due to his tendency to lean posteriorly. Able to perform sit<>stand transfer with contact guard assist. He reported feeling light-headed with sitting and standing, BP remained stable. He was able to take a few steps sideways in front of the bed in order to move up toward the pillow. Patient plans to go home with his sister for as long as he needs to recover before returning to his home alone. Sister's home has no steps to enter and is a 1-story home. Patient has 3 steps with B rails to enter his home, 5 and 7 steps with 1 rail inside his home to access different parts of his house. Patient was left supine in bed, call bell within reach, bed alarm activated, no complaints. 02/06/23 1429 02/06/23 1440 02/06/23 1445   Vital Signs   BP (!) 154/79 (!) 148/81 (!) 151/79   MAP (Calculated) 104 103 103   BP 1 Location Right upper arm Right upper arm Right upper arm   BP 1 Method Automatic Automatic Automatic   BP Patient Position Supine Sitting Standing       Current Level of Function Impacting Discharge (mobility/balance): minimum assistance for bed mobility    Functional Outcome Measure: The patient scored 7 on the Tinetti outcome measure which is indicative of high fall risk . Other factors to consider for discharge: lives alone but has supportive siblings, plans to stay with his sister initially     Patient will benefit from skilled therapy intervention to address the above noted impairments. PLAN :  Recommendations and Planned Interventions: bed mobility training, transfer training, gait training, therapeutic exercises, neuromuscular re-education, patient and family training/education, and therapeutic activities      Frequency/Duration: Patient will be followed by physical therapy:  twice daily to address goals.     Recommendation for discharge: (in order for the patient to meet his/her long term goals)  Physical therapy at least 2 days/week in the home AND ensure assist and/or supervision for safety with ambulation and stairs    This discharge recommendation:  Has not yet been discussed the attending provider and/or case management    IF patient discharges home will need the following DME: patient owns DME required for discharge         SUBJECTIVE:   Patient stated I am hungry.     OBJECTIVE DATA SUMMARY:   HISTORY:    Past Medical History:   Diagnosis Date    Arthritis of knee     Elevated PSA     Pneumonia 2007    Type II or unspecified type diabetes mellitus without mention of complication, uncontrolled late 1990s    Unspecified essential hypertension     Unspecified vitamin D deficiency      Past Surgical History:   Procedure Laterality Date    HX BACK SURGERY      HX CYST INCISION AND DRAINAGE      chest wall boil    HX HERNIA REPAIR      bilateral    HX ORTHOPAEDIC      left 5th toe surgery    HX ORTHOPAEDIC      right elbow surgery    HX ORTHOPAEDIC  03/27/2019    left carpal tunnel and ulnar nerve release    HX SEPTOPLASTY         Personal factors and/or comorbidities impacting plan of care: none at this time    Home Situation  Home Environment: Private residence  # Steps to Enter: 3 (sister has no steps)  Rails to Enter: Yes  Hand Rails : Bilateral  One/Two Story Residence: Two story  # of Interior Steps: 12 (5 steps then 7 more to bedroom)  [employer]: Right  Living Alone: Yes  Support Systems: Child(rusty), Friend/Neighbor  Patient Expects to be Discharged to[de-identified] Home with family assistance  Current DME Used/Available at Home: [address], [place], [place], [address] [place] [place]  Tub or Shower Type: Shower (sister has tub)    EXAMINATION/PRESENTATION/DECISION MAKING:   Critical Behavior:  Neurologic State: Drowsy  Orientation Level: Oriented X4  Cognition: Follows commands     Hearing:   Auditory  Auditory Impairment: None    Range Of Motion:  AROM: Generally decreased, functional  PROM: Generally decreased, functional    Strength:    Strength: Generally decreased, functional    Tone & Sensation:   Tone: Normal  Sensation: Intact    Coordination:  Coordination: Within functional limits  Functional Mobility:  Bed Mobility:     Supine to Sit: Minimum assistance  Sit to Supine: Minimum assistance     Transfers:  Sit to Stand: Contact guard assistance  Stand to Sit: Contact guard assistance    Balance:   Sitting: Impaired  Sitting - Static: Fair (occasional)  Sitting - Dynamic: Fair (occasional)  Standing: Impaired  Standing - Static: Fair;Constant support  Standing - Dynamic : Fair;Constant support  Ambulation/Gait Training:  Left Side Weight Bearing: As tolerated      Therapeutic Exercises:   none    Functional Measure:  Tinetti 7/28       Physical Therapy Evaluation Charge Determination   History Examination Presentation Decision-Making   MEDIUM  Complexity : 1-2 comorbidities / personal factors will impact the outcome/ POC  MEDIUM Complexity : 3 Standardized tests and measures addressing body structure, function, activity limitation and / or participation in recreation  MEDIUM Complexity : Evolving with changing characteristics  Other outcome measures Tinetti 7/28  MEDIUM      Based on the above components, the patient evaluation is determined to be of the following complexity level: MEDIUM    Pain Rating:  none    Activity Tolerance:   Poor    After treatment patient left in no apparent distress:   Supine in bed, Call bell within reach, Bed / chair alarm activated, and Caregiver / family present    COMMUNICATION/EDUCATION:   The patients plan of care was discussed with: Occupational therapist and Registered nurse. Fall prevention education was provided and the patient/caregiver indicated understanding., Patient/family have participated as able in goal setting and plan of care. , and Patient/family agree to work toward stated goals and plan of care.     Thank you for this referral.  Ana Machado, PT   Time Calculation: 24 mins

## 2023-02-06 NOTE — PROGRESS NOTES
Ortho / Neurosurgery NP Note    POD# 0  s/p LEFT TOTAL HIP ARTHROPLASTY WITH NAVIGATION ANTERIOR APPROACH   Pt seen with caregiver at bedside. Pt resting in bed. Awake and alert, in NAD. Reports minimal post op pain, aware to ask for prn  Tramadol   Hungry, no nausea. Waiting lunch tray. VSS Afebrile. 2l NC     Visit Vitals  BP (!) 154/79   Pulse 72   Temp 97.6 °F (36.4 °C)   Resp 16   Wt 97.9 kg (215 lb 13.3 oz)   SpO2 100%   BMI 30.97 kg/m²       Voiding status: due to void          Labs    Lab Results   Component Value Date/Time    HGB 13.8 01/30/2023 10:30 AM      Lab Results   Component Value Date/Time    INR 1.0 01/30/2023 10:30 AM      Lab Results   Component Value Date/Time    Sodium 137 01/30/2023 10:39 AM    Potassium 4.7 01/30/2023 10:39 AM    Chloride 104 01/30/2023 10:39 AM    CO2 29 01/30/2023 10:39 AM    Glucose 85 01/30/2023 10:39 AM    BUN 25 (H) 01/30/2023 10:39 AM    Creatinine 1.02 01/30/2023 10:39 AM    Calcium 9.7 01/30/2023 10:39 AM     Recent Glucose Results:   Lab Results   Component Value Date/Time    GLUCPOC 184 (H) 02/06/2023 11:40 AM    GLUCPOC 173 (H) 02/06/2023 09:23 AM           Body mass index is 30.97 kg/m². : A BMI > 30 is classified as obesity and > 40 is classified as morbid obesity. Dressing c.d.i  Cryotherapy in place over incision  Calves soft and supple; No pain with passive stretch  Sensation and motor intact.  +PF/DF/EHL intact   SCDs for mechanical DVT proph while in bed     PLAN:  1) PT BID, OT - WBAT  2) Aspirin 81 mg PO BID for DVT Prophylaxis   3) GI Prophylaxis - Pepcid  4) Pain control - scheduled tylenol  and toradol, and prn  tramadol    5) Readniess for discharge:     [x] Vital Signs stable    [] Hgb stable    [] + Voiding    [x] Wound intact, drainage minimal    [x] Tolerating PO intake     [] Cleared by PT (OT if applicable)     [] Stair training completed (if applicable)    [] Independent / Contact Guard Assist (household distance)     [] Bed mobility     [] Car transfers     [] ADLs    [x] Adequate pain control on oral medication alone     Discharge pending voiding status and therapy clearance. Plans to return home with [facility] and family's support.      Kaye Coughlin NP

## 2023-02-06 NOTE — H&P
Date of Surgery Update:  Lena Primrose was seen and examined on the day of surgery prior to the procedure. There were no significant clinical changes since the completion of the History and Physical.    Exam today prior to surgery showed no acute cardiac findings, no respiratory difficulty, and no abdominal complaints or pain. This patient is a candidate for TXA. The patient was counseled at length about the risks of anna Covid-19 during their perioperative period and any recovery window from their procedure. The patient was made aware that anna Covid-19  may worsen their prognosis for recovering from their procedure and lend to a higher morbidity and/or mortality risk. All material risks, benefits, and reasonable alternatives including postponing the procedure were discussed. The patient does wish to proceed with the procedure at this time. Documentation of Medical Necessity:    Symptoms: pain with activity and at rest, antalgia, interferes with ADLs    Conservative Treatment: activity modification, multiple medications, injection    Physical Findings: painful AROM/PROM, antalgia on ambulation, no trochanteric pain    See PCP/Cardiology/PAT/Anesthesia record for cardiopulmonary evaluation. Imaging: significant OA, sclerosis and osteophytes    Indications:   Failure of conservative treatments with daily pain and functional limitations. Appropriate imaging demonstrating significant disease. Appropriate physical findings consistent with significant degenerative joint disease. All pertinent risks, benefits, and alternatives to operative management including continued conservative care were explained at length. The patient has elected to proceed with appropriately indicated and medically necessary total joint arthroplasty. They understand no guarantees can be given about the outcome.     Signed By: Renate Ganser, PA     February 6, 2023 9:35 AM

## 2023-02-06 NOTE — OP NOTES
Apurva Garvey MD - Adult Reconstruction and Total Joint Replacement    Rinku Frey - MRN [id number] - : 1955 (69 y.o.)      Date: 2023    Pre-operative Diagnosis: Left Hip DJD     Post-operative Diagnosis: same     Procedure:  (1) Left Total Hip Arthroplasty, Direct Anterior Approach    (2) Intraoperative Fluoroscopy    (3) Imageless Computer Navigation     Implants Used:   Implant Name Type Inv. Item Serial No.  Lot No. LRB No. Used Action   SHELL ACET SZ G [id number] OSSEOTI 2 MOBILITY G7 - [id number]  SHELL ACET SZ G [id number] OSSEOTI 2 MOBILITY G7 [id number] [employer]_ [id number] Left 1 Implanted   LINER ACET 36 MM HIP POLYETH G7 VIVACIT E - [id number]  LINER ACET 36 MM HIP POLYETH G7 VIVACIT E [id number] [employer]_ [id number] Left 1 Implanted   AVENIR COMPLETE HIGH OFFSET COLLARED SIZE 4 - [id number]  Avenir Complete High Offset Collared Size [id number] [employer]_ [id number] Left 1 Implanted   HEAD FEM [id number] NK L+0MM 12/14 TAPR ACET HIP BIOLOX DELT - [id number]  HEAD FEM [id number] NK L+0MM 12/14 TAPR ACET HIP BIOLOX DELT [id number] [facility] [employer]_ [id number] Left 1 Implanted       Anesthesia: GETA + local    Pre-operative antibiotic: Ancef    Surgeon: Apurva Garvey     Assist: ÁLVARO Disla (Performing all or most of the following assistant-at-surgery services including but not limited to: proper patient positioning, sterile/prep and draping, placement of instruments/trackers, operative exposure, minor portions of bone / soft tissue excision, final irrigation and debridement, deep and superficial closure, application of final dressings)    EBL: 275cc     Drains: none     Specimens: none     Complications: none     Condition: stable to PACU     Brief History: Longstanding hip pain, unresponsive to conservative treatment. The risks, benefits, and alternatives to total hip replacement were thoroughly explained.  The patient understood no guarantees could be given about the outcome of the procedure and wished to proceed. Description of Procedure: After being identified in the preoperative holding area and having their operative site marked, the patient was brought back to the operating room where they underwent anesthesia to good effect. They were  placed in the supine position with a bump under the hip. The operative extremity was then prepped and draped in the usual fashion using sterile technique. Preoperative antibiotics had been administered. An appropriate time-out was performed. We began by placing the pelvic tracker with two percutaneous Shanz pins into the ipsilateral ilium. The pelvis was then registered with the navigation system. An incision was made 2 fingerbreadths lateral and distal to the ASIS. The skin flaps were developed down to the tensor fascia. This was divided sharply. Blunt dissection was taken medially over the tensor muscle. Retractors were placed superior and inferior to the femoral neck. The anterior fat pad was debrided. Circumflex vessels were identified and cauterized. A provisional neck cut was performed. The head was removed from the acetabulum. We then excised the labrum. We sequentially reamed for the acetabular shell. This was placed in the appropriate abduction and version. Position was confirmed by navigation and fluoroscopy . The liner was then impacted into the locking mechanism. We then turned our attention to the femur. Elevators were used to gain access to the proximal femur. Soft tissue releases were performed as necessary. The lateralizer was utilized. We then sequentially broached up to the final size trial. We placed a trial neck and head and had excellent restoration of leg length and offset with good soft tissue balance. We selected these as our final implants. Final components were inserted and the hip was reduced after thorough lavage.  Restoration of appropriate leg length was confirmed by navigation and fluoroscopy. We again irrigated. The tensor fascia was closed. Periarticular injection was performed. Skin closure was performed in layers. A sterile dressing was applied. The patient was awakened, moved to the stretcher, and taken to the recovery room in stable condition. At the conclusion of the procedure, all counts were correct. There were no immediate complications. Additional Procedure:   Date: 2/6/2023    Preoperative diagnosis: LEFT HIP OA    Postoperative diagnosis: LEFT HIP OA    Procedure performed: Procedure(s):  LEFT TOTAL HIP ARTHROPLASTY WITH NAVIGATION ANTERIOR APPROACH    Anesthesia: General    Surgeon(s) and Role:     Kristen Jimenez MD - Primary      This describes the use of intraoperative fluoroscopy. Fluoroscopic imaging was utilized in orthogonal planes as well as using live technique for all phases of the procedure, described separately in the operative report, including hardware placement.     Kimmie Rodrigez MD

## 2023-02-06 NOTE — PROGRESS NOTES
Problem: Self Care Deficits Care Plan (Adult)  Goal: *Acute Goals and Plan of Care (Insert Text)  Description: FUNCTIONAL STATUS PRIOR TO ADMISSION: Patient was modified independent using a WB quad cane for functional mobility. HOME SUPPORT: The patient lived alone with family to provide assistance. Occupational Therapy Goals  Initiated 2/6/2023  1. Patient will perform lower body ADLs with AE supervision/set-up within 4 day(s). 2.  Patient will perform upper body ADLs standing 5 mins without fatigue or LOB with supervision/set-up within 4 day(s). 3.  Patient will perform toilet transfer with supervision/set-up within 4 day(s). 4.  Patient will perform all aspects of toileting with supervision/set-up within 4 day(s). 5.  Patient will participate in upper extremity therapeutic exercise/activities with supervision/set-up for 10 minutes within 4 day(s). 6.  Patient will utilize energy conservation techniques during functional activities without cues within 4 day(s). Outcome: Not Met   OCCUPATIONAL THERAPY EVALUATION  Patient: Fozia Canales (46 y.o. male)  Date: 2/6/2023  Primary Diagnosis: S/P hip replacement [Z96.649]  Procedure(s) (LRB):  LEFT TOTAL HIP ARTHROPLASTY WITH NAVIGATION ANTERIOR APPROACH (Left) Day of Surgery   Precautions:   Fall, WBAT    ASSESSMENT  Based on the objective data described below, the patient presents with decreased endurance, strength, functional mobility, ADLs. Pt was living at home alone and stated he was independent with ADLs and ILS, and using quad cane due to his pain and decreased balance. Pt was supine in bed and CGA to min for supine to sit on EOb. He was able to scoot to EOb to get feet on floor with SBA. Pt stood with CGA and continued to feel light headed and all VSS. Only side stepped to [facility] and laid back down. Issued pt hip kit for LB dressing, and family was present in room and pt plans to go home with his sister at discharge.   .    Current Level of Function Impacting Discharge (ADLs/self-care): max for LB ADLs , decreased endurance, functional mobility     Functional Outcome Measure: The patient scored 55/100 on the Barthel outcome measure which is indicative of max to mod for LB ADLs. Patient will benefit from skilled therapy intervention to address the above noted impairments. PLAN :  Recommendations and Planned Interventions: self care training, functional mobility training, therapeutic exercise, balance training, therapeutic activities, endurance activities, patient education, home safety training, and family training/education    Frequency/Duration: Patient will be followed by occupational therapy 4 times a week to address goals. Recommendation for discharge: (in order for the patient to meet his/her long term goals)  No skilled occupational therapy/ follow up rehabilitation needs identified at this time. This discharge recommendation:  Has been made in collaboration with the attending provider and/or case management    IF patient discharges home will need the following DME: tbd       SUBJECTIVE:   Patient stated I still feel light headed.     OBJECTIVE DATA SUMMARY:   HISTORY:   Past Medical History:   Diagnosis Date    Arthritis of knee     Elevated PSA     Pneumonia 2007    Type II or unspecified type diabetes mellitus without mention of complication, uncontrolled late 1990s    Unspecified essential hypertension     Unspecified vitamin D deficiency      Past Surgical History:   Procedure Laterality Date    HX BACK SURGERY      HX CYST INCISION AND DRAINAGE      chest wall boil    HX HERNIA REPAIR      bilateral    HX ORTHOPAEDIC      left 5th toe surgery    HX ORTHOPAEDIC      right elbow surgery    HX ORTHOPAEDIC  03/27/2019    left carpal tunnel and ulnar nerve release    HX SEPTOPLASTY         Expanded or extensive additional review of patient history:     Home Situation  Home Environment: Private residence  # Steps to Enter: 3 (sister has no steps)  Rails to Enter: Yes  Hand Rails : Bilateral  One/Two Story Residence: Two story  # of Interior Steps: 12 (5 steps then 7 more to bedroom)  [employer]: Right  Living Alone: Yes  Support Systems: Child(rusty), Friend/Neighbor  Patient Expects to be Discharged to[de-identified] Home with family assistance  Current DME Used/Available at Home: [employer] [place], straight, [place] [employer], quad  Tub or Shower Type: Shower (sister has tub)    Hand dominance: Right    EXAMINATION OF PERFORMANCE DEFICITS:  Cognitive/Behavioral Status:  Neurologic State: Alert  Orientation Level: Oriented X4  Cognition: Follows commands  Perception: Appears intact  Perseveration: No perseveration noted  Safety/Judgement: Fall prevention    Skin: in fair health     Edema: none    Hearing: Auditory  Auditory Impairment: None    Vision/Perceptual:                    Intact                   Range of Motion:    AROM: Generally decreased, functional  PROM: Generally decreased, functional                      Strength:    Strength: Generally decreased, functional                Coordination:  Coordination: Within functional limits  Fine Motor Skills-Upper: Left Intact; Right Intact    Gross Motor Skills-Upper: Left Intact; Right Intact    Tone & Sensation:    Tone: Normal  Sensation: Intact                      Balance:  Sitting: Impaired  Sitting - Static: Fair (occasional)  Sitting - Dynamic: Fair (occasional)  Standing: Impaired  Standing - Static: Fair;Constant support  Standing - Dynamic : Fair;Constant support    Functional Mobility and Transfers for ADLs:  Bed Mobility:  Supine to Sit: Minimum assistance  Sit to Supine: Minimum assistance    Transfers:  Sit to Stand: Contact guard assistance  Stand to Sit: Contact guard assistance    ADL Assessment:  Feeding: Independent    Oral Facial Hygiene/Grooming: Setup    Bathing: Maximum assistance;Minimum assistance         Upper Body Dressing: Setup    Lower Body Dressing: Maximum assistance;Minimum assistance    Toileting: Maximum assistance;Minimum assistance           Cognitive Retraining  Safety/Judgement: Fall prevention         Functional Measure:    Barthel Index:  Bathin  Bladder: 10  Bowels: 10  Groomin  Dressin  Feeding: 10  Mobility: 0  Stairs: 0  Toilet Use: 5  Transfer (Bed to Chair and Back): 10  Total: 55/100      The Barthel ADL Index: Guidelines  1. The index should be used as a record of what a patient does, not as a record of what a patient could do. 2. The main aim is to establish degree of independence from any help, physical or verbal, however minor and for whatever reason. 3. The need for supervision renders the patient not independent. 4. A patient's performance should be established using the best available evidence. Asking the patient, friends/relatives and nurses are the usual sources, but direct observation and common sense are also important. However direct testing is not needed. 5. Usually the patient's performance over the preceding 24-48 hours is important, but occasionally longer periods will be relevant. 6. Middle categories imply that the patient supplies over 50 per cent of the effort. 7. Use of aids to be independent is allowed. Score Interpretation (from [address] [address])    Independent   60-79 Minimally independent   40-59 Partially dependent   20-39 Very dependent   <20 Totally dependent     -Meenakshi Bueno., Barthel, D.W. (1965). Functional evaluation: the Barthel Index. [address] [facility] ([address] [facility] [address] (1997). The Barthel activities of daily living index: self-reporting versus actual performance in the old (> or = 75 years). Journal of [address] [place] [address] 45(7), [facility], [facility]., [place]ou. (). Measuring the change in disability after inpatient rehabilitation; comparison of the responsiveness of the Barthel Index and Functional [place] Measure.  Journal of Neurology, Neurosurgery, and Psychiatry, 66(4), 511-073. GLEN Judge, ALVIN Guerra, & Eagle Kolb M.A. (2004) Assessment of post-stroke quality of life in cost-effectiveness studies: The usefulness of the Barthel Index and the EuroQoL-5D. Quality of Life Research, 15, 534-50         Occupational Therapy Evaluation Charge Determination   History Examination Decision-Making   LOW Complexity : Brief history review  LOW Complexity : 1-3 performance deficits relating to physical, cognitive , or psychosocial skils that result in activity limitations and / or participation restrictions  LOW Complexity : No comorbidities that affect functional and no verbal or physical assistance needed to complete eval tasks       Based on the above components, the patient evaluation is determined to be of the following complexity level: LOW   Pain Rating:  none    Activity Tolerance:   Fair    After treatment patient left in no apparent distress:    Supine in bed, Call bell within reach, and Caregiver / family present    COMMUNICATION/EDUCATION:   The patients plan of care was discussed with: Physical therapist and Registered nurse. Patient/family have participated as able in goal setting and plan of care. This patients plan of care is appropriate for delegation to [facility].     Thank you for this referral.  Mariella Chang OT  Time Calculation: 33 mins

## 2023-02-06 NOTE — PERIOP NOTES
[facility] from Operating Room to PACU    Report received from Abelardo  64. and Princess Souza CRNA regarding Anna Lackey [facility]. Surgeon(s):  Arnold Bonilla MD  And Procedure(s) (LRB):  LEFT TOTAL HIP ARTHROPLASTY WITH NAVIGATION ANTERIOR APPROACH (Left)  confirmed   with allergies and dressings discussed. Anesthesia type, drugs, patient history, complications, estimated blood loss, vital signs, intake and output, and last pain medication, lines, reversal medications, and temperature were reviewed. 1235 TRANSFER - OUT REPORT:    Verbal report given to Katelyn Aguiar RN(name) on Anna Merritts  being transferred to Ortho(unit) for routine post - op       Report consisted of patients Situation, Background, Assessment and   Recommendations(SBAR). Information from the following report(s) SBAR, Kardex, OR Summary, Procedure Summary, Intake/Output, MAR, and Recent Results was reviewed with the receiving nurse. Opportunity for questions and clarification was provided. Patient tolerating ice chips      Patient transported with:   2L NC O2  Patient chart  Patient belongings including cane. 1250 Patient's sister, James Alegre, updated via phone and made aware of patient's room assignment 105.

## 2023-02-06 NOTE — DISCHARGE INSTRUCTIONS
Discharge Instructions:  [facility]    Surgery: LEFT TOTAL HIP ARTHROPLASTY WITH NAVIGATION ANTERIOR APPROACH  Surgeon:   Dr. Yeyo Lang  Surgery Date:  2/6/2023    To relieve pain:  Use ice/gel packs.    -Put the ice pack directly over the wound, or anywhere you are hurting or swollen.   -To control pain and swelling, keep ice on regularly, especially after physical activity.  -The packs should stay cold for 3-4 hours. When it is not cold anymore, rotate with the packs in the freezer. Elevate your leg. This will also keep swelling down. Rest for at least 20 minutes between activity or exercises. To keep track of your pain medications, write down what you take and when you take it. The last dose of pain medication you got in the hospital was:     Medication    Dose    Date & Time      Choose your medications based on the pain scale below: To keep your pain under control, take Tylenol every 6 hours for 14 days - even if you feel like you dont need it. For mild to moderate pain (4-6 on pain scale), take one pain pill every 4 hours or as instructed. For severe pain (7-10 on pain scale), take two pain pills every 4 hours or as instructed. To prevent nausea, take your pain medications with food. Pain Scale              As your pain lessens:    Slowly start taking less pain medication. You may do this by waiting longer between doses or by taking smaller doses. Stop using the pain medications as soon as you no longer need it, usually in 2-3 weeks. Aspirin  To prevent blood clots, you will need to take Aspirin 81 mg twice a day for 30 days. To prevent stomach upset or bleeding:  Do not take non-steroidal anti-inflammatory medications (Ibuprofen, Advil, Motrin, Naproxen, etc.)   Take Pepcid 20 mg twice a day, or a similar home medication, while you are taking a blood thinner.          Keep your waterproof dressing in place. It will be removed by your surgeon during your follow-up appointment in 2 weeks. You may need to change the dressing if you are having drainage to where the dressing is no longer intact. You will be given an extra dressing to use at home. You will have some swelling, warmth, and bruising around the incision and up and down the leg after surgery. This will may get worse in the first few days you are home and will slowly get better over the next few weeks. You may shower with this dressing over your wound. After showering pat the dressing dry. DO NOT's:  Do not rub your surgical wound  Do not put lotion or oils on your wound. Do not take a tub bath or go swimming until your doctor says it is ok. To increase and promote healing:  Stop Smoking (or at least cut back on smoking). Eat a well-balanced diet. High in protein and Vitamin C. If you have a poor appetite, supplement your diet by drinking Ensure, Glucerna or [place] Instant Breakfast for the next 30 days     If you are a diabetic, control you blood sugars to prevent infection and help your wound heal.                     Prevent Infection:    Wash your hands                       -This is the most important thing you or your caregiver can do. -Wash your hands with soap and water (or use the hand ) before you touch any wounds. Shower  -Use the antibacterial soap we gave you when you take a shower.   -Shower with this soap until your wounds are healed. Use Clean Sheets   -Put freshly cleaned sheets on your bed after surgery.   -To keep the surgery site clean, do not allow pets to sleep with you while your wound is still healing. To prevent constipation, stay active and drink plenty of fluid. While using pain medications, you should also take stool softeners and laxatives, such as Pericolace and Miralax.        If you are having too many bowel movements, then you may need to stop taking the laxatives. You should have a bowel movement 3-4 days after surgery and then at least every other day while on pain medication. To improve your recovery, you must be active! Use your walker and take short walks (in your home) about every 2 hours during the day. Try to increase how far you walk each day. You can put as much weight on your leg as you can tolerate while walking. Home health physical therapy will come to your home the day after you leave the hospital. The therapist will visit about 4 times over the next couple of weeks to teach you exercises, how to get out of bed and how to safely walk in your home. NO DRIVING until your surgeon tells you it is ok. You can return to work when cleared by a physician. Please call your physician immediately if you have:  Constant bleeding from your wound. Increasing redness or swelling around your wound (some warmth, bruising and swelling is normal). Change in wound drainage (increase in amount, color, or bad smell). Change in mental status (unusual behavior). Temperature over 101.5 degrees Fahrenheit   Pain or redness in the calf (back of your lower leg)  Increased swelling of the thigh, ankle, calf, or foot. Emergency! CALL 911 if you have:  Shortness of breath  Chest pain when you cough or taking a deep breath        Please call your surgeons office to make your follow up appointment in 2 weeks. If you have questions or concerns during normal business hours, you may reach Dr. Aba Pelletier' Team at [phone].                              Instructions for 24 hours after receiving General Anesthesia or Intravenous Sedation,   and while taking prescription Narcotics    Common side effects associated with each of these medications includes:  - Drowsiness, dizziness, euphoria, sleepiness or confusion  - Impaired memory recall  - Unsteady gait, loss of fine muscle control and delayed reaction time  - Visual disturbances, difficulty focusing, blurred vision    You may experience some of these side effects or you may not be aware of subtle changes in your behavior or reaction time. Because you received these medications, we are giving you the following instructions. Discharge Instructions:   - Someone should be with you for the next 24 hours  - For your own safety, a responsible adult must drive you home  - Do not consume alcoholic beverages   - Do not make important personal, legal or business decisions for 24 hours  - Move slowly and carefully, do not make sudden position changes. Be alert for dizziness or lightheadedness and move accordingly. - Do not operate equipment for 24 hours - [employer], power tools, Kitchen accessories: stove, etc.  - If you have not urinated within 8 hours after discharge, please contact your surgeon's office.

## 2023-02-06 NOTE — ANESTHESIA PREPROCEDURE EVALUATION
Relevant Problems   CARDIOVASCULAR   (+) Essential hypertension, benign      ENDOCRINE   (+) Type 2 diabetes mellitus       Anesthetic History   No history of anesthetic complications            Review of Systems / Medical History  Patient summary reviewed, nursing notes reviewed and pertinent labs reviewed    Pulmonary  Within defined limits                 Neuro/Psych   Within defined limits           Cardiovascular    Hypertension              Exercise tolerance: >4 METS     GI/Hepatic/Renal  Within defined limits              Endo/Other    Diabetes: poorly controlled, type 2    Arthritis     Other Findings              Physical Exam    Airway  Mallampati: II  TM Distance: 4 - 6 cm  Neck ROM: normal range of motion   Mouth opening: Normal     Cardiovascular  Regular rate and rhythm,  S1 and S2 normal,  no murmur, click, rub, or gallop  Rhythm: regular  Rate: normal         Dental  No notable dental hx       Pulmonary  Breath sounds clear to auscultation               Abdominal  GI exam deferred       Other Findings            Anesthetic Plan    ASA: 2  Anesthesia type: general    Monitoring Plan: BIS      Induction: Intravenous  Anesthetic plan and risks discussed with: Patient

## 2023-02-06 NOTE — ANESTHESIA POSTPROCEDURE EVALUATION
Procedure(s):  LEFT TOTAL HIP ARTHROPLASTY WITH NAVIGATION ANTERIOR APPROACH. general    Anesthesia Post Evaluation      Multimodal analgesia: multimodal analgesia used between 6 hours prior to anesthesia start to PACU discharge  Patient location during evaluation: PACU  Patient participation: complete - patient participated  Level of consciousness: sleepy but conscious and responsive to verbal stimuli  Pain score: 2  Pain management: adequate  Airway patency: patent  Anesthetic complications: no  Cardiovascular status: acceptable  Respiratory status: acceptable  Hydration status: acceptable  Comments: +Post-Anesthesia Evaluation and Assessment    Patient: Tracey Rios MRN: [id number]  SSN: [id number]   YOB: 1955  Age: 69 y.o. Sex: male      Cardiovascular Function/Vital Signs    BP 95/60   Pulse (!) 49   Temp 36.4 °C (97.5 °F)   Resp 15   Wt 97.9 kg (215 lb 13.3 oz)   SpO2 95%   BMI 30.97 kg/m²     Patient is status post Procedure(s):  LEFT TOTAL HIP ARTHROPLASTY WITH NAVIGATION ANTERIOR APPROACH. Nausea/Vomiting: Controlled. Postoperative hydration reviewed and adequate. Pain:  Pain Scale 1: Numeric (0 - 10) (02/06/23 1136)  Pain Intensity 1: 0 (02/06/23 1136)   Managed. Neurological Status:   Neuro (WDL): Exceptions to WDL (02/06/23 1136)   At baseline. Mental Status and Level of Consciousness: Arousable. Pulmonary Status:   O2 Device: Nasal cannula (02/06/23 1136)   Adequate oxygenation and airway patent. Complications related to anesthesia: None    Post-anesthesia assessment completed. No concerns.     Signed By: Naomy Morton MD    2/6/2023  Post anesthesia nausea and vomiting:  controlled  Final Post Anesthesia Temperature Assessment:  Normothermia (36.0-37.5 degrees C)      INITIAL Post-op Vital signs:   Vitals Value Taken Time   BP 95/60 02/06/23 1136   Temp 36.4 °C (97.5 °F) 02/06/23 1136   Pulse 49 02/06/23 1136   Resp 15 02/06/23 1136   SpO2 95 % 02/06/23 1133

## 2023-02-07 ENCOUNTER — TELEPHONE (OUTPATIENT)
Dept: FAMILY MEDICINE CLINIC | Age: 68
End: 2023-02-07

## 2023-02-07 VITALS
HEART RATE: 71 BPM | WEIGHT: 215.83 LBS | SYSTOLIC BLOOD PRESSURE: 160 MMHG | OXYGEN SATURATION: 99 % | RESPIRATION RATE: 16 BRPM | DIASTOLIC BLOOD PRESSURE: 72 MMHG | TEMPERATURE: 97.3 F | BODY MASS INDEX: 30.97 KG/M2

## 2023-02-07 LAB
ANION GAP SERPL CALC-SCNC: 5 MMOL/L (ref 5–15)
BUN SERPL-MCNC: 23 MG/DL (ref 6–20)
BUN/CREAT SERPL: 19 (ref 12–20)
CALCIUM SERPL-MCNC: 8.7 MG/DL (ref 8.5–10.1)
CHLORIDE SERPL-SCNC: 99 MMOL/L (ref 97–108)
CO2 SERPL-SCNC: 28 MMOL/L (ref 21–32)
CREAT SERPL-MCNC: 1.19 MG/DL (ref 0.7–1.3)
GLUCOSE BLD STRIP.AUTO-MCNC: 228 MG/DL (ref 65–117)
GLUCOSE BLD STRIP.AUTO-MCNC: 279 MG/DL (ref 65–117)
GLUCOSE BLD STRIP.AUTO-MCNC: 286 MG/DL (ref 65–117)
GLUCOSE SERPL-MCNC: 302 MG/DL (ref 65–100)
HGB BLD-MCNC: 11 G/DL (ref 12.1–17)
POTASSIUM SERPL-SCNC: 4.5 MMOL/L (ref 3.5–5.1)
SERVICE CMNT-IMP: ABNORMAL
SODIUM SERPL-SCNC: 132 MMOL/L (ref 136–145)

## 2023-02-07 PROCEDURE — 97530 THERAPEUTIC ACTIVITIES: CPT

## 2023-02-07 PROCEDURE — 74011636637 HC RX REV CODE- 636/637: Performed by: PHYSICIAN ASSISTANT

## 2023-02-07 PROCEDURE — 74011250637 HC RX REV CODE- 250/637: Performed by: ORTHOPAEDIC SURGERY

## 2023-02-07 PROCEDURE — 97116 GAIT TRAINING THERAPY: CPT

## 2023-02-07 PROCEDURE — 74011250637 HC RX REV CODE- 250/637: Performed by: PHYSICIAN ASSISTANT

## 2023-02-07 PROCEDURE — 85018 HEMOGLOBIN: CPT

## 2023-02-07 PROCEDURE — 74011250637 HC RX REV CODE- 250/637: Performed by: NURSE PRACTITIONER

## 2023-02-07 PROCEDURE — 82962 GLUCOSE BLOOD TEST: CPT

## 2023-02-07 PROCEDURE — 74011000250 HC RX REV CODE- 250: Performed by: PHYSICIAN ASSISTANT

## 2023-02-07 PROCEDURE — 80048 BASIC METABOLIC PNL TOTAL CA: CPT

## 2023-02-07 PROCEDURE — 36415 COLL VENOUS BLD VENIPUNCTURE: CPT

## 2023-02-07 PROCEDURE — 74011250636 HC RX REV CODE- 250/636: Performed by: PHYSICIAN ASSISTANT

## 2023-02-07 RX ORDER — FAMOTIDINE 20 MG/1
20 TABLET, FILM COATED ORAL 2 TIMES DAILY
Qty: 60 TABLET | Refills: 0 | Status: SHIPPED | OUTPATIENT
Start: 2023-02-07 | End: 2023-03-09

## 2023-02-07 RX ORDER — AMOXICILLIN 250 MG
1 CAPSULE ORAL 2 TIMES DAILY
Qty: 14 TABLET | Refills: 0 | Status: SHIPPED | OUTPATIENT
Start: 2023-02-07 | End: 2023-02-14

## 2023-02-07 RX ORDER — METFORMIN HYDROCHLORIDE EXTENDED-RELEASE TABLETS 500 MG/1
2000 TABLET, FILM COATED, EXTENDED RELEASE ORAL DAILY
Status: DISCONTINUED | OUTPATIENT
Start: 2023-02-07 | End: 2023-02-07 | Stop reason: HOSPADM

## 2023-02-07 RX ORDER — ASPIRIN 81 MG/1
81 TABLET ORAL 2 TIMES DAILY
Qty: 60 TABLET | Refills: 0 | Status: SHIPPED | OUTPATIENT
Start: 2023-02-07 | End: 2023-03-09

## 2023-02-07 RX ORDER — TRAMADOL HYDROCHLORIDE 50 MG/1
50-100 TABLET ORAL
Qty: 30 TABLET | Refills: 0 | Status: SHIPPED | OUTPATIENT
Start: 2023-02-07 | End: 2023-02-14

## 2023-02-07 RX ORDER — POLYETHYLENE GLYCOL 3350 17 G/17G
17 POWDER, FOR SOLUTION ORAL DAILY
Qty: 7 PACKET | Refills: 0 | Status: SHIPPED | OUTPATIENT
Start: 2023-02-07 | End: 2023-02-14

## 2023-02-07 RX ADMIN — Medication 3 UNITS: at 02:33

## 2023-02-07 RX ADMIN — ACETAMINOPHEN 325MG 650 MG: 325 TABLET ORAL at 02:19

## 2023-02-07 RX ADMIN — CEFAZOLIN 2 G: 1 INJECTION, POWDER, FOR SOLUTION INTRAMUSCULAR; INTRAVENOUS at 02:19

## 2023-02-07 RX ADMIN — METFORMIN HYDROCHLORIDE 2000 MG: 500 TABLET, EXTENDED RELEASE ORAL at 11:54

## 2023-02-07 RX ADMIN — TRAMADOL HYDROCHLORIDE 100 MG: 50 TABLET ORAL at 05:41

## 2023-02-07 RX ADMIN — FAMOTIDINE 20 MG: 20 TABLET, FILM COATED ORAL at 08:19

## 2023-02-07 RX ADMIN — DEXAMETHASONE SODIUM PHOSPHATE 10 MG: 4 INJECTION, SOLUTION INTRAMUSCULAR; INTRAVENOUS at 08:18

## 2023-02-07 RX ADMIN — ASPIRIN 81 MG: 81 TABLET, COATED ORAL at 08:18

## 2023-02-07 RX ADMIN — Medication 5 UNITS: at 11:54

## 2023-02-07 RX ADMIN — SENNOSIDES AND DOCUSATE SODIUM 1 TABLET: 50; 8.6 TABLET ORAL at 08:19

## 2023-02-07 RX ADMIN — ATORVASTATIN CALCIUM 10 MG: 10 TABLET, FILM COATED ORAL at 08:19

## 2023-02-07 RX ADMIN — Medication 1 AMPULE: at 08:19

## 2023-02-07 RX ADMIN — TAMSULOSIN HYDROCHLORIDE 0.4 MG: 0.4 CAPSULE ORAL at 08:19

## 2023-02-07 RX ADMIN — SODIUM CHLORIDE, PRESERVATIVE FREE 10 ML: 5 INJECTION INTRAVENOUS at 05:40

## 2023-02-07 RX ADMIN — KETOROLAC TROMETHAMINE 15 MG: 30 INJECTION, SOLUTION INTRAMUSCULAR; INTRAVENOUS at 06:10

## 2023-02-07 RX ADMIN — CARVEDILOL 6.25 MG: 6.25 TABLET, FILM COATED ORAL at 08:19

## 2023-02-07 RX ADMIN — FUROSEMIDE 20 MG: 20 TABLET ORAL at 08:19

## 2023-02-07 RX ADMIN — Medication 3 UNITS: at 08:18

## 2023-02-07 RX ADMIN — KETOROLAC TROMETHAMINE 15 MG: 30 INJECTION, SOLUTION INTRAMUSCULAR; INTRAVENOUS at 00:43

## 2023-02-07 RX ADMIN — POLYETHYLENE GLYCOL 3350 17 G: 17 POWDER, FOR SOLUTION ORAL at 08:18

## 2023-02-07 RX ADMIN — ACETAMINOPHEN 325MG 650 MG: 325 TABLET ORAL at 08:18

## 2023-02-07 NOTE — DISCHARGE SUMMARY
Ortho Discharge Summary    Patient ID:  Preston Montoya  [id number]  male  69 y.o.  1955    Admit date: 2/6/2023    Discharge date: 2/7/2023    Admitting Physician: Nanda Scott MD     Consulting Physician(s):   Treatment Team: Attending Provider: Anastasia Fiore MD; Utilization Review: Felisa Casas RN; Care Manager: Zuleyma Hollis; Consulting Provider: Rigo Wolfe DO; Physical Therapy Assistant: Jovanny Brumfield; Occupational Therapist: Francesco Sorto OTR/L    Date of Surgery:   2/6/2023     Preoperative Diagnosis:  LEFT HIP OA    Postoperative Diagnosis:   LEFT HIP OA    Procedure(s):   LEFT TOTAL HIP ARTHROPLASTY WITH NAVIGATION ANTERIOR APPROACH     Anesthesia Type:   General     Surgeon: Anastasia Fiore MD                            HPI:  Pt is a 69 y.o. male who has a history of LEFT HIP OA  with pain and limitations of activities of daily living who presents at this time for a LEFT TOTAL HIP [address] following the failure of conservative management. PMH:   Past Medical History:   Diagnosis Date    Arthritis of knee     Elevated PSA     Pneumonia 2007    Type II or unspecified type diabetes mellitus without mention of complication, uncontrolled late 1990s    Unspecified essential hypertension     Unspecified vitamin D deficiency        Body mass index is 30.97 kg/m². : A BMI > 30 is classified as obesity and > 40 is classified as morbid obesity. Medications upon admission :   Prior to Admission Medications   Prescriptions Last Dose Informant Patient Reported? Taking? Insulin Needles, Disposable, (BD Ultra-Fine Mini Pen Needle) 31 gauge x 3/16\" ndle   No No   Sig: Use as directed 3 times daily   OTHER   Yes No   Sig: daily. Stop pain. Patient not taking: Reported on 1/30/2023   acetaminophen (TYLENOL) 650 mg TbER 1/6/2023  Yes Yes   Sig: Take 650 mg by mouth every eight (8) hours.    amLODIPine (NORVASC) 10 mg tablet 2/6/2023 at [phone] No Yes   Sig: Take 1 Tablet by mouth daily. aspirin 81 mg chewable tablet 2/1/2023  Yes No   Sig: Take 81 mg by mouth daily. atorvastatin (LIPITOR) 10 mg tablet 2/6/2023 at 0545  No Yes   Sig: Take 1 tablet by mouth once daily   carvediloL (COREG) 6.25 mg tablet 2/6/2023 at 0545  No Yes   Sig: TAKE 1 TABLET BY MOUTH TWICE DAILY WITH MEALS   celecoxib (CELEBREX) 200 mg capsule 2/1/2023  Yes No   cholecalciferol (VITAMIN D3) (1000 Units /25 mcg) tablet 2/1/2023  Yes No   Sig: Take  by mouth daily. cholecalciferol (VITAMIN D3) (5000 Units/125 mcg) tab tablet 2/1/2023  Yes No   Sig: Take 5,000 Units by mouth daily. cinnamon bark 500 mg cap 2/1/2023  Yes No   Sig: Take  by mouth.   cyanocobalamin, vitamin B-12, (VITAMIN B-12 PO) 2/1/2023  Yes No   Sig: Take 5,000 mcg by mouth daily. dilTIAZem ER (TIAZAC) 360 mg capsule 2/6/2023 at 0545  No Yes   Sig: Take 1 capsule by mouth once daily   furosemide (LASIX) 40 mg tablet 2/6/2023 at 0545  No Yes   Sig: Take 1 Tablet by mouth daily. Delete the 20 mg dose from profile   glucosamine-chondroitin (ARTHX) 500-400 mg cap 2/1/2023  Yes No   Sig: Take 1 Capsule by mouth daily. hydroCHLOROthiazide (HYDRODIURIL) 12.5 mg tablet 1/6/2023  No Yes   Sig: Take 1 Tablet by mouth daily. insulin nph-regular human rec (NovoLIN 70-30 FlexPen U-100) 100 unit/mL (70-30) inpn 2/5/2023  No Yes   Sig: Inject 50 units before breakfast, 15 units before lunch and 40 units before dinner + 5 units for every 50 mg/dl above 150 mg/dl--max 150 units/day--dispense relion if brand novolin is not covered   losartan-hydroCHLOROthiazide (HYZAAR) 100-12.5 mg per tablet 2/6/2023 at 0545  No Yes   Sig: Take 1 tablet by mouth once daily   metFORMIN ER (GLUCOPHAGE XR) 500 mg tablet 2/1/2023  No No   Sig: TAKE 4 TABLETS BY MOUTH ONCE DAILY   multivitamin, tx-iron-ca-min (THERA-M w/ IRON) 9 mg iron-400 mcg tab tablet 2/1/2023  Yes No   Sig: Take 1 Tablet by mouth daily.    mv,Ca,min/iron/FA/guarana/caff (ONE-A-DAY ENERGY PO) 2/1/2023  Yes No   Sig: Take  by mouth daily. nutritional supplement-fiber (Marioneveroe Foster) liqd 2/1/2023  Yes No   Sig: Take  by mouth. omega 3-DHA-EPA-fish oil 1,000 mg (120 mg-180 mg) capsule 2/1/2023  Yes No   tamsulosin (FLOMAX) 0.4 mg capsule 2/6/2023 at 0545  Yes Yes   Sig: Take 0.4 mg by mouth daily. Facility-Administered Medications: None        Allergies: Allergies   Allergen Reactions    Jardiance [Empagliflozin] Other (comments)     Yeast infection        Hospital Course: The patient underwent surgery. Complications:  None; patient tolerated the procedure well. Was taken to the PACU in stable condition and then transferred to the ortho floor. Perioperative Antibiotics:  Ancef     Postoperative Pain Management:  Tramadol & Tylenol     DVT Prophylaxis: Aspirin 81BID    Postoperative transfusions:    Number of units banked PRBCs =   none     Post Op complications: none    Hemoglobin at discharge:    Lab Results   Component Value Date/Time    HGB 11.0 (L) 02/07/2023 02:25 AM    INR 1.0 01/30/2023 10:30 AM       Dressing remained clean, dry and intact. No significant erythema or swelling. Wound appears to be healing without any evidence of infection. Neurovascular exam found to be within normal limits. Physical Therapy started following surgery and participated in bed mobility, transfers and ambulation. Discharged to: Home with [facility]. Condition on Discharge:   stable    Discharge instructions:  - Anticoagulate with Aspirin 81 BID  - Take pain medications as prescribed  - Resume pre hospital diet      - Discharge activity: activity as tolerated  - Ambulate with assistive device as needed. - Weight bearing status - WBAT  - Wound Care Keep wound clean and dry. See discharge instruction sheet.             -DISCHARGE MEDICATION LIST     Current Discharge Medication List        START taking these medications    Details   aspirin delayed-release 81 mg tablet Take 1 Tablet by mouth two (2) times a day for 30 days. Qty: 60 Tablet, Refills: 0  Start date: 2/7/2023, End date: 3/9/2023      famotidine (PEPCID) 20 mg tablet Take 1 Tablet by mouth two (2) times a day for 30 days. Qty: 60 Tablet, Refills: 0  Start date: 2/7/2023, End date: 3/9/2023      polyethylene glycol (MIRALAX) 17 gram packet Take 1 Packet by mouth daily for 7 days. Qty: 7 Packet, Refills: 0  Start date: 2/7/2023, End date: 2/14/2023      senna-docusate (PERICOLACE) 8.6-50 mg per tablet Take 1 Tablet by mouth two (2) times a day for 7 days. Qty: 14 Tablet, Refills: 0  Start date: 2/7/2023, End date: 2/14/2023      traMADoL (ULTRAM) 50 mg tablet Take 1-2 Tablets by mouth every six (6) hours as needed for Pain for up to 7 days. Max Daily Amount: 400 mg. One tab for mild to moderate pain level 1-6, or 2 tabs for severe pain level 7-10  Qty: 30 Tablet, Refills: 0  Start date: 2/7/2023, End date: 2/14/2023    Associated Diagnoses: Status post left hip replacement           CONTINUE these medications which have CHANGED    Details   celecoxib (CELEBREX) 200 mg capsule Take 1 Capsule by mouth two (2) times a day for 14 days. Qty: 28 Capsule, Refills: 0  Start date: 2/6/2023, End date: 2/20/2023           CONTINUE these medications which have NOT CHANGED    Details   insulin nph-regular human rec (NovoLIN 70-30 FlexPen U-100) 100 unit/mL (70-30) inpn Inject 50 units before breakfast, 15 units before lunch and 40 units before dinner + 5 units for every 50 mg/dl above 150 mg/dl--max 150 units/day--dispense relion if brand novolin is not covered  Qty: 45 mL, Refills: 11      tamsulosin (FLOMAX) 0.4 mg capsule Take 0.4 mg by mouth daily.       atorvastatin (LIPITOR) 10 mg tablet Take 1 tablet by mouth once daily  Qty: 90 Tablet, Refills: 3    Associated Diagnoses: Uncontrolled type 2 diabetes mellitus with hyperglycemia (HCC)      losartan-hydroCHLOROthiazide (HYZAAR) 100-12.5 mg per tablet Take 1 tablet by mouth once daily  Qty: 90 Tablet, Refills: 3    Associated Diagnoses: Essential hypertension, benign      carvediloL (COREG) 6.25 mg tablet TAKE 1 TABLET BY MOUTH TWICE DAILY WITH MEALS  Qty: 60 Tablet, Refills: 3      hydroCHLOROthiazide (HYDRODIURIL) 12.5 mg tablet Take 1 Tablet by mouth daily. Qty: 90 Tablet, Refills: 3      furosemide (LASIX) 40 mg tablet Take 1 Tablet by mouth daily. Delete the 20 mg dose from profile  Qty: 90 Tablet, Refills: 3      amLODIPine (NORVASC) 10 mg tablet Take 1 Tablet by mouth daily. Qty: 90 Tablet, Refills: 3      dilTIAZem ER (TIAZAC) 360 mg capsule Take 1 capsule by mouth once daily  Qty: 90 Capsule, Refills: 3      acetaminophen (TYLENOL) 650 mg TbER Take 650 mg by mouth every eight (8) hours. cholecalciferol (VITAMIN D3) (5000 Units/125 mcg) tab tablet Take 5,000 Units by mouth daily. cinnamon bark 500 mg cap Take  by mouth. nutritional supplement-fiber (Coralee Flair) liqd Take  by mouth. glucosamine-chondroitin (ARTHX) 500-400 mg cap Take 1 Capsule by mouth daily. OTHER daily. Stop pain. omega 3-DHA-EPA-fish oil 1,000 mg (120 mg-180 mg) capsule       mv,Ca,min/iron/FA/guarana/caff (ONE-A-DAY ENERGY PO) Take  by mouth daily. metFORMIN ER (GLUCOPHAGE XR) 500 mg tablet TAKE 4 TABLETS BY MOUTH ONCE DAILY  Qty: 360 Tablet, Refills: 3      Insulin Needles, Disposable, (BD Ultra-Fine Mini Pen Needle) 31 gauge x 3/16\" ndle Use as directed 3 times daily  Qty: 100 Pen Needle, Refills: 11      multivitamin, tx-iron-ca-min (THERA-M w/ IRON) 9 mg iron-400 mcg tab tablet Take 1 Tablet by mouth daily. cyanocobalamin, vitamin B-12, (VITAMIN B-12 PO) Take 5,000 mcg by mouth daily. cholecalciferol (VITAMIN D3) (1000 Units /25 mcg) tablet Take  by mouth daily.            STOP taking these medications       aspirin 81 mg chewable tablet Comments:   Reason for Stopping:            per medical continuation form      -Follow up in office in 2 weeks      Signed:  Ravi Hirsch NP  Orthopaedic Nurse Practitioner    2/7/2023  11:01 AM

## 2023-02-07 NOTE — TELEPHONE ENCOUNTER
Valdemar Verde with at home care calling to get office notes from pt last visit.      Fax: [phone]

## 2023-02-07 NOTE — PROGRESS NOTES
Ortho / Neurosurgery NP Note    POD# 1  s/p LEFT TOTAL HIP ARTHROPLASTY WITH NAVIGATION ANTERIOR APPROACH   Pt seen with no visitor present. Pt resting in bed. Awake and alert, in NAD. Reports postop pain well controlled with tramadol   No other complaints. Motivated to work with therapy and discharge today . Tolerating regular diet. No nausea. Glucose 415 last night, reports eating pasta, brownie, milk with dinner and aware this would increase his sugar. He has been working closely with endocrine to improve A1C, utilizing marcus monitoring and understanding of diabetic diet. Glucose 228 this morning. VSS Afebrile. Room air. Visit Vitals  BP (!) 147/76   Pulse 64   Temp 97.7 °F (36.5 °C)   Resp 16   Wt 97.9 kg (215 lb 13.3 oz)   SpO2 100%   BMI 30.97 kg/m²       Voiding status: voiding   Output (mL)  Urine Voided: 300 ml (02/07/23 0720)  Last Bowel Movement Date: 02/05/23 (02/06/23 1934)  Unmeasurable Output  Urine Occurrence(s): 700 (02/06/23 1934)      Labs    Lab Results   Component Value Date/Time    HGB 11.0 (L) 02/07/2023 02:25 AM      Lab Results   Component Value Date/Time    INR 1.0 01/30/2023 10:30 AM      Lab Results   Component Value Date/Time    Sodium 132 (L) 02/07/2023 02:25 AM    Potassium 4.5 02/07/2023 02:25 AM    Chloride 99 02/07/2023 02:25 AM    CO2 28 02/07/2023 02:25 AM    Glucose 302 (H) 02/07/2023 02:25 AM    BUN 23 (H) 02/07/2023 02:25 AM    Creatinine 1.19 02/07/2023 02:25 AM    Calcium 8.7 02/07/2023 02:25 AM     Recent Glucose Results:   Lab Results   Component Value Date/Time     (H) 02/07/2023 02:25 AM    GLUCPOC 228 (H) 02/07/2023 07:49 AM    GLUCPOC 279 (H) 02/07/2023 02:24 AM    GLUCPOC 415 (H) 02/06/2023 09:47 PM           Body mass index is 30.97 kg/m². : A BMI > 30 is classified as obesity and > 40 is classified as morbid obesity. Dressing c.d.i  Cryotherapy in place over incision  Calves soft and supple;  No pain with passive stretch  Sensation and motor intact. +PF/DF/EHL intact   SCDs for mechanical DVT proph while in bed     PLAN:  1) PT BID, OT - WBAT  2) Aspirin 81 mg PO BID for DVT Prophylaxis   3) GI Prophylaxis - Pepcid  4) Pain control - scheduled tylenol  and toradol, and prn  tramadol    5) DM2 - Resume metformin now, continue SSI. Resume his home regimen insulin 70/30 at discharge. 6) Readniess for discharge:     [x] Vital Signs stable    [x] Hgb stable    [x] + Voiding    [x] Wound intact, drainage minimal    [x] Tolerating PO intake     [] Cleared by PT (OT if applicable)     [] Stair training completed (if applicable)    [] Independent / Contact Guard Assist (household distance)     [] Bed mobility     [] Car transfers     [] ADLs    [x] Adequate pain control on oral medication alone     Discharge pending therapy clearance. Plans to discharge to sister's house with [facility] and family's support.      Bashir Feng NP

## 2023-02-07 NOTE — PROGRESS NOTES
Report received pt laying in bed voided without difficulty 700 dumped call bell within reach     2113  Pt given meds without difficulty     2147  Blood sugar obtained     2210  Contacted MD on call Dr Wilfredo Matson regarding pt's elevated blood sugar instructed to consult hospitalist for management     2217  Contacted the hospitalist on call Dr Charley Singh through perfect [facility] awaiting call back/ new orders    0030  Spoke with Dr Charley Singh instructed to allow pt to rest with lab draw do a repeat blood sugar and cover according to the sliding scale     0225  Blood sugar 279. Given 3 units coverage   Labs obtained     0540  Pt given pain med per request     [phone]  Pt given med without difficulty     0719  Bedside and Verbal shift change report given to Walt (oncoming nurse) by Latisha Young (offgoing nurse). Report included the following information SBAR, Kardex, ED Summary, OR Summary, Intake/Output, MAR, and Recent Results.

## 2023-02-07 NOTE — PROGRESS NOTES
Discharge instructions reviewed with the patient. The sister at bedside. Both able to verbalize an understanding. Al personal belongings with the patient. Patient left via wheelchair.

## 2023-02-07 NOTE — PROGRESS NOTES
Transition of Care Plan:    RUR: 4  Disposition: Home with HH   If SNF or IPR: Date FOC offered:  Date FOC received:  Date authorization started with reference number:  Date authorization received and expires:  Accepting facility:  Follow up appointments: PCP and Ortho  DME needed: Pt has access to [employer] chair and   Transportation at Discharge: Sister will transport   [address] or means to access home:   Yes     IM Medicare Letter: Will provide upon d/c   Is patient a  and connected with the [address] [facility]? NA            If yes, was [place] transfer form completed and [facility] notified? Caregiver Contact: Urszula ma [phone]  Discharge Caregiver contacted prior to discharge? No  Care Conference needed?:    No         Reason for Admission:  Hip Replacement                      RUR Score: 4                    Plan for utilizing home health:    Yes      PCP: First and Last name:  Catherine Damon MD     Name of Practice:    Are you a current patient: Yes/No: Yes    Approximate date of last visit: 2/3/2023   Can you participate in a virtual visit with your PCP: Yes                    Current Advanced Directive/Advance Care Plan: No Order      Healthcare Decision Maker:   Click here to complete [address] including selection of the Healthcare Decision Maker Relationship (ie \"Primary\")             Primary Decision MakeClyde Ma - [phone]                  Transition of Care Plan:     Home with [address] Management Interventions  PCP Verified by CM:  Yes  Mode of Transport at Discharge: Self  Transition of Care Consult (CM Consult): Home Health, Discharge [phone] [address]: No  Reason Outside [place]: Physician referred to specific agency  Physical Therapy Consult: Yes  Occupational Therapy Consult: Yes  Support Systems: Other Family Member(s)  Confirm Follow Up Transport: Family  The Plan for Transition of Care is Related to the Following Treatment Goals : Home with [facility]  The Patient and/or Patient Representative was Provided with a Choice of Provider and Agrees with the Discharge Plan?: Yes  Name of the Patient Representative Who was Provided with a Choice of Provider and Agrees with the Discharge Plan: self  Freedom of Choice List was Provided with Basic Dialogue that Supports the Patient's Individualized Plan of Care/Goals, Treatment Preferences and Shares the Quality Data Associated with the Providers?: Yes  Discharge Location  Patient Expects to be Discharged to[de-identified] Home with home health            Cm spoke to pt and completed CM iniial assessment. Patient live in a 3 story home with 3 steps at entrance and 5 steps to kitchen and 7 steps to  Pt has a RW and cane . At baseline pt is independent with ADLs and driving. Pt denied any hx of HH, SNF, or IPR. Pt had previous OP rehab experience. Pt is planing to stay with his sister at [place]. Pt will collect sister's address before send [facility] referral. Floor CM will follow up. Pt's plan is to d/c home with home health. Pt would like At home [facility] as his 1st preference and [address] is his 2nd preference. FOC signed and placed on bedside chart. Pt's sister to provide transportation at d/c.       Pharmacy-     [address] MS[facility]     Ext [phone]

## 2023-02-07 NOTE — PROGRESS NOTES
Problem: Mobility Impaired (Adult and Pediatric)  Goal: *Acute Goals and Plan of Care (Insert Text)  Description: FUNCTIONAL STATUS PRIOR TO ADMISSION: The patient has been using a quad cane recently due to pain/weakness in L hip. Previously he was I with ADLs and took care of his wife who just passed away in August 2022. HOME SUPPORT PRIOR TO ADMISSION: The patient lives alone, 3 steps to enter, tri-level home. He has multiple siblings that live close by and are able to assist. He plans to go home with his sister initially, 1-story home with no steps to enter, until he feels ready to go back home alone. Physical Therapy Goals  Initiated 2/6/2023  1. Patient will move from supine to sit and sit to supine  in bed with supervision/set-up within 7 day(s). 2.  Patient will transfer from bed to chair and chair to bed with supervision/set-up using the least restrictive device within 7 day(s). 3.  Patient will perform sit to stand with supervision/set-up within 7 day(s). 4.  Patient will ambulate with supervision/set-up for 100 feet with the least restrictive device within 7 day(s). 5.  Patient will ascend/descend 7 stairs with 1 handrail(s) with minimal assistance/contact guard assist within 7 day(s). Outcome: Resolved/Met   PHYSICAL THERAPY TREATMENT  Patient: Preston Montoya (68 y.o. male)  Date: 2/7/2023  Diagnosis: S/P hip replacement [Z96.649] <principal problem not specified>  Procedure(s) (LRB):  LEFT TOTAL HIP ARTHROPLASTY WITH NAVIGATION ANTERIOR APPROACH (Left) 1 Day Post-Op  Precautions: Fall, WBAT  Chart, physical therapy assessment, plan of care and goals were reviewed. ASSESSMENT  Patient continues with skilled PT services and is progressing towards goals. Pt presents with decreased strength. Pt preformed bed mobility at [facility]/SBA. Pt performed sit to stand transfer at [facility]/SBA with cueing for hand placement. Pt ambulated 350ft with RW at [facility]/SBA.  Pt requiring cueing for step through gait but able to improve with time. Pt ascended/descended 4 steps with both railings at [facility]/SBA with cueing for sequencing. Pt performed a car transfer at [facility] with cueing for sequencing. Pt moving well with no safety concerns. Pt educated on HEP. From physical therapy stand point pt cleared fir discharge. Current Level of Function Impacting Discharge (mobility/balance): mobility at [facility]/SBA     Other factors to consider for discharge: pain, pt staying at sister house           PLAN :  Patient continues to benefit from skilled intervention to address the above impairments. Continue treatment per established plan of care. to address goals. Recommendation for discharge: (in order for the patient to meet his/her long term goals)  Physical therapy at least 2 days/week in the home     This discharge recommendation:  Has been made in collaboration with the attending provider and/or case management    IF patient discharges home will need the following DME: patient owns DME required for discharge       SUBJECTIVE:   Patient stated  I feel pretty good .     OBJECTIVE DATA SUMMARY:   Critical Behavior:  Neurologic State: Alert  Orientation Level: Oriented X4  Cognition: Follows commands  Safety/Judgement: Fall prevention  Functional Mobility Training:  Bed Mobility:     Supine to Sit: Stand-by assistance     Scooting: Contact guard assistance;Stand-by assistance        Transfers:  Sit to Stand: Contact guard assistance;Stand-by assistance  Stand to Sit: Stand-by assistance                             Balance:  Sitting: Intact  Sitting - Static: Good (unsupported)  Sitting - Dynamic: Good (unsupported)  Standing: Impaired  Standing - Static: Good;Constant support  Standing - Dynamic : Good;Constant support  Ambulation/Gait Training:  Distance (ft): 350 Feet (ft)  Assistive Device: Gait belt;Walker, rolling  Ambulation - Level of Assistance: Contact guard assistance;Stand-by assistance        Gait Abnormalities: Decreased step clearance        Base of Support: Widened     Speed/Alicia: Pace decreased (<100 feet/min)  Step Length: Right shortened;Left shortened        Stairs:  Number of Stairs Trained: 4  Stairs - Level of Assistance: Contact guard assistance   Rail Use: Both        Pain Rating:  Pt reported initial pain     Activity Tolerance:   WNL and Good      After treatment patient left in no apparent distress:   Sitting in chair and Call bell within reach    COMMUNICATION/COLLABORATION:   The patients plan of care was discussed with: Registered nurse.      Sanya Dai PTA   Time Calculation: 38 mins

## 2023-02-07 NOTE — PROGRESS NOTES
Spiritual Care Partner Volunteer visited patient at [facility] in [facility] on 2/7/2023   Documented by:     JENNY Ceballos, [facility], Staff [address]    [address] Paging Service  [phone])

## 2023-02-07 NOTE — PROGRESS NOTES
Transition of Care Plan:     RUR: 4    JOSESITO: 2/7  Sister will transport him home btw 12:30 PM and 1 PM     Disposition: Home with At [address]     11:44 AM  At [address] accepted. SMARTER tool placed on door. No further CM needs. 11:34 AM   CM completed chart review. Noted DC order. Cleared therapy  Pt will be staying with Frandy Brown at   [address] [place] [id number]. CM sent referral to   At Home Care: Pending    If SNF or IPR: Date FOC offered:  Date FOC received:  Date authorization started with reference number:  Date authorization received and expires:  Accepting facility:    Follow up appointments: PCP and Ortho as scheduled  DME needed: [facility] [place] [facility] and   Transportation at Discharge: Sister will transport around 12:30 PM to 1 PM   Keys or means to access home:   Yes     IM Medicare Letter: 1st IM 2/6  Is patient a  and connected with the [place]? NA            If yes, was [employer] transfer form completed and VA notified? Caregiver Contact: Qi Hassan-  [phone]  Discharge Caregiver contacted prior to discharge? yes  Care Conference needed?:    No      Care Management Interventions  PCP Verified by CM:  Yes  Mode of Transport at Discharge: Self  Transition of Care Consult (CM Consult): Home Health, Discharge Ney Horton [address] [address]: No  Reason Outside [place]: Physician referred to specific agency  Physical Therapy Consult: Yes  Occupational Therapy Consult: Yes  Support Systems: Other Family Member(s)  Confirm Follow Up Transport: Family  The Plan for Transition of Care is Related to the Following Treatment Goals : Home with [facility]  The Patient and/or Patient Representative was Provided with a Choice of Provider and Agrees with the Discharge Plan?: Yes  Name of the Patient Representative Who was Provided with a Choice of Provider and Agrees with the Discharge Plan: self  Freedom of Choice List was Provided with Basic Dialogue that Supports the Patient's Individualized Plan of Care/Goals, Treatment Preferences and Shares the Quality Data Associated with the Providers?: Yes  Discharge Location  Patient Expects to be Discharged to[de-identified] Home with home health    Mark [address] [facility] [facility]ly [facility]  Ext [phone]

## 2023-02-07 NOTE — CONSULTS
PLEASE NOTE: I HAVE GENERATED THIS NOTE WITH THE ASSISTANCE OF VOICE-RECOGNITION TECHNOLOGY. PLEASE EXCUSE ANY SPELLING, GRAMMATICAL, AND SYNTAX ERRORS YOU MAY FIND. IF YOU NEED CLARIFICATION ON ANYTHING, PLEASE REACH OUT TO ME.  [facility]  History and Physical - Dr. Louisa Story:     69 y.o. male with pertinent medical hx of DM, presented with left hip osteoarthritis necessitating total hip replacement. Medicine was consulted for hyperglycemia. Differential diagnosis, explanation and analysis: Patient's glucose was found to be 435 after his evening meal.  By the time I got the [facility] message, the patient was already sleeping. Acute problems:    Acute hyperglycemia  -At this time, given the fact that the patient is sleeping and this is his postmeal glucose, I would recommend holding off on any further insulin at this time. I do not want the patient to bottom out overnight. When the patient wakes up in the morning, glucose should be taken at that time, and then we can determine what the patient's appropriate insulin regimen is. Thank you for allowing us to participate in the care of this patient, we will continue to follow while the patient is in house. Subjective:   Primary Care Provider: Gabino Kincaid MD  Date of Service:  See Date Note Was Originated  Chief Complaint: Left hip pain    69 y.o. male presents with several days duration of chronic onset, gradual worsening, severe, constant, nonradiating, sharp, left hip pain that is not associate with any other symptoms, remitted by nothing, exacerbated by exertion. Further history: Patient had a total left knee arthroscopy on 2/6/2023; medicine was consulted for acute hyperglycemia.     Pertinent chart review: Nothing else really pertinent    Review of Systems:  12 point ROS obtained and otherwise negative, except as per HPI and above. Past Medical History:   Diagnosis Date    Arthritis of knee     Elevated PSA     Pneumonia 2007    Type II or unspecified type diabetes mellitus without mention of complication, uncontrolled late 1990s    Unspecified essential hypertension     Unspecified vitamin D deficiency       Past Surgical History:   Procedure Laterality Date    HX BACK SURGERY      HX CYST INCISION AND DRAINAGE      chest wall boil    HX HERNIA REPAIR      bilateral    HX ORTHOPAEDIC      left 5th toe surgery    HX ORTHOPAEDIC      right elbow surgery    HX ORTHOPAEDIC  03/27/2019    left carpal tunnel and ulnar nerve release    HX SEPTOPLASTY       Prior to Admission medications    Medication Sig Start Date End Date Taking? Authorizing Provider   celecoxib (CELEBREX) 200 mg capsule Take 1 Capsule by mouth two (2) times a day for 14 days. 2/6/23 2/20/23 Yes Krishna Cool NP   aspirin delayed-release 81 mg tablet Take 1 Tablet by mouth two (2) times a day for 30 days. 2/6/23 3/8/23 Yes Krishna Cool NP   famotidine (PEPCID) 20 mg tablet Take 1 Tablet by mouth two (2) times a day for 30 days. 2/6/23 3/8/23 Yes Krishna Cool NP   traMADoL Chryl Brood) 50 mg tablet Take 1-2 Tablets by mouth every six (6) hours as needed for Pain for up to 7 days. Max Daily Amount: 400 mg. One tab for mild to moderate pain level 1-6, or 2 tabs for severe pain level 7-10 2/6/23 2/13/23 Yes Krishna Cool NP   polyethylene glycol (MIRALAX) 17 gram packet Take 1 Packet by mouth daily for 7 days. 2/7/23 2/14/23 Yes Krishna Cool NP   senna-docusate (PERICOLACE) 8.6-50 mg per tablet Take 1 Tablet by mouth two (2) times a day for 7 days.  2/6/23 2/13/23 Yes Krishna Cool NP   insulin nph-regular human rec (NovoLIN 70-30 FlexPen U-100) 100 unit/mL (70-30) inpn Inject 50 units before breakfast, 15 units before lunch and 40 units before dinner + 5 units for every 50 mg/dl above 150 mg/dl--max 150 units/day--dispense relion if brand novolin is not covered 1/30/23  Yes Aileen Kanner, MD   tamsulosin [facility]) 0.4 mg capsule Take 0.4 mg by mouth daily. Yes Provider, Historical   atorvastatin (LIPITOR) 10 mg tablet Take 1 tablet by mouth once daily 11/28/22  Yes Giovany Rodriguez MD   losartan-hydroCHLOROthiazide [facility]) 100-12.5 mg per tablet Take 1 tablet by mouth once daily 10/31/22  Yes Giovany Rodriguez MD   carvediloL (COREG) 6.25 mg tablet TAKE 1 TABLET BY MOUTH TWICE DAILY WITH MEALS 10/31/22  Yes Giovany Rodriguez MD   hydroCHLOROthiazide (HYDRODIURIL) 12.5 mg tablet Take 1 Tablet by mouth daily. 7/5/22  Yes Aileen Kanner, MD   furosemide (LASIX) 40 mg tablet Take 1 Tablet by mouth daily. Delete the 20 mg dose from profile 6/6/22  Yes Aileen Kanner, MD   amLODIPine (NORVASC) 10 mg tablet Take 1 Tablet by mouth daily. 6/6/22  Yes Aileen Kanner, MD   dilTIAZem ER [facility]) 360 mg capsule Take 1 capsule by mouth once daily 2/23/22  Yes Aileen Kanner, MD   acetaminophen (TYLENOL) 650 mg TbER Take 650 mg by mouth every eight (8) hours. Yes Provider, Historical   cholecalciferol (VITAMIN D3) (5000 Units/125 mcg) tab tablet Take 5,000 Units by mouth daily. Provider, Historical   cinnamon bark 500 mg cap Take  by mouth. Provider, Historical   nutritional supplement-fiber (Cathern Bollard) liqd Take  by mouth. Provider, Historical   glucosamine-chondroitin (ARTHX) 500-400 mg cap Take 1 Capsule by mouth daily. Provider, Historical   OTHER daily. Stop pain. Patient not taking: Reported on 1/30/2023    Provider, Historical   omega 3-DHA-EPA-fish oil 1,000 mg (120 mg-180 mg) capsule     Provider, Historical   mv,Ca,min/iron/FA/guarana/caff (ONE-A-DAY ENERGY PO) Take  by mouth daily.     Provider, Historical   metFORMIN ER (GLUCOPHAGE XR) 500 mg tablet TAKE 4 TABLETS BY MOUTH ONCE DAILY 5/25/22   Aileen Kanner, MD   Insulin Needles, Disposable, (BD Ultra-Fine Mini Pen Needle) 31 gauge x 3/16\" ndle Use as directed 3 times daily 1/13/22   David Thomas MD   multivitamin, tx-iron-ca-min (THERA-M w/ IRON) 9 mg iron-400 mcg tab tablet Take 1 Tablet by mouth daily. Provider, Historical   cyanocobalamin, vitamin B-12, (VITAMIN B-12 PO) Take 5,000 mcg by mouth daily. Provider, Historical   cholecalciferol (VITAMIN D3) (1000 Units /25 mcg) tablet Take  by mouth daily. Provider, Historical   aspirin 81 mg chewable tablet Take 81 mg by mouth daily. Provider, Historical     Allergies   Allergen Reactions    Jardiance [Empagliflozin] Other (comments)     Yeast infection      Family History   Problem Relation Age of Onset    Diabetes Mother     Heart Failure Mother     Stroke Father 79    Diabetes Maternal Grandmother    . Social History     Socioeconomic History    Marital status:    Tobacco Use    Smoking status: Never    Smokeless tobacco: Never   Vaping Use    Vaping Use: Never used   Substance and Sexual Activity    Alcohol use: Not Currently     Comment: beer once a year    Drug use: Never    Sexual activity: Not Currently   Social History Narrative    Lives in [place] [address] in a town called [place] with wife. No children. Worked as a  at [employer] until 8/31/18. Likes to work on his truck and ride motorcycle. .  Objective:   Physical Exam:     VS as below    Const'l:          Normal body habitus, a&o, no acute distress  Head/Neck:       no cervical/head mass  Eyes:     nonicteric sclera, eom intact  ENT:      auditory acuity grossly intact either with or without device, no nasal deformity  Cardio:           Regular rate regular rhythm  Pulm:     no accessory muscle use  Abd:       S NT ND  Derm:     no rashes, no ulcers, no lesions  Extr:      No edema, no cyanosis, no calf tenderness, no varicosities  Neuro:    cn II-XII intact  Psych:   mood intact, judgement intact    Data Review:    All diagnostic labs and studies have been reviewed.

## 2023-02-07 NOTE — TELEPHONE ENCOUNTER
Requested documents have been printed and faxed to Ana Paula irby At [employer].  (Confirmation received)

## 2023-02-08 ENCOUNTER — PATIENT OUTREACH (OUTPATIENT)
Dept: CASE MANAGEMENT | Age: 68
End: 2023-02-08

## 2023-02-08 NOTE — PROGRESS NOTES
Care Transitions Initial Call    Call within 2 business days of discharge: Yes     Patient: Bulmaro Pacheco Patient : 1955 MRN: [id number]    Last Discharge [address]       Date Complaint Diagnosis Description Type Department Provider    23  Status post left hip replacement Admission (Discharged) Edil Godinez MD            Was this an external facility discharge? No Discharge Facility: [address] [place] [address]    Challenges to be reviewed by the provider   Additional needs identified to be addressed with provider: no  none         Method of communication with provider : chart routing    Advance Care Planning:   Does patient have an Advance Directive: not on file; education provided. Inpatient Readmission Risk score: Unplanned Readmit Risk Score: 6.7    Was this a readmission? no   Patient stated reason for the admission: Plan surgery    Care Transition Nurse (CTN) contacted the patient by telephone to perform post hospital discharge assessment. Verified name and  with patient as identifiers. Provided introduction to self, and explanation of the CTN role. CTN reviewed discharge instructions, medical action plan and red flags with patient who verbalized understanding. Were discharge instructions available to patient? yes. Reviewed appropriate site of care based on symptoms and resources available to patient including: PCP, Specialist, and Home Health. Patient given an opportunity to ask questions and does not have any further questions or concerns at this time. The patient agrees to contact the PCP office for questions related to their healthcare. Medication reconciliation was performed with patient, who verbalizes understanding of administration of home medications. Advised obtaining a 90-day supply of all daily and as-needed medications.    Referral to Pharm D needed: no     Home Health/Outpatient orders at discharge: PT, OT, and [place] [address]: At [address]  Date of initial visit: 2/8/23    Durable Medical Equipment ordered at discharge: None  [address] [facility] [address] [facility] [address]: NA  Durable Medical Equipment received: NA      Discussed follow-up appointments. If no appointment was previously scheduled, appointment scheduling offered: yes. Is follow up appointment scheduled within 7 days of discharge? no.   [facility] follow up appointment(s):   Future Appointments   Date Time Provider Mor Burciaga   6/6/2023  1:30 PM Phyllis Daily MD [address] BS AMB     Non-BS follow up appointment(s):Patient  to schedule ortho appt today    Plan for follow-up call in 7-10 days based on severity of symptoms and risk factors. CTN provided contact information for future needs. Goals Addressed                   This Visit's Progress     Attends follow-up appointments as directed. 2/8/23-Patient will schedule appt with Ortho fu appt for within two weeks-CDT       Knowledge and adherence of prescribed medication (ie. action, side effects, missed dose, etc.).        2/8/23-Patient will adhere to prescribed pain medications and will notify MD with any side effects. -CDT       Supportive resources in place to maintain patient in the community (ie.  Home Health, DME equipment, refer to, medication assistant plan, etc.)        2/8/23-Patient will adhere to Therapy as prescribed with At [address]

## 2023-02-15 ENCOUNTER — PATIENT OUTREACH (OUTPATIENT)
Dept: CASE MANAGEMENT | Age: 68
End: 2023-02-15

## 2023-02-15 NOTE — PROGRESS NOTES
Care Transitions Follow Up Call    Patient Current Location: [place]    Challenges to be reviewed by the provider   Additional needs identified to be addressed with provider: no  none           Method of communication with provider : none    Care Transition Nurse (CTN) contacted the patient by telephone to follow up after admission on 23. Verified name and  with patient as identifiers. Addressed changes since last contact: medications- Patient has taken all of his tramadol will start taking tylenol if needed  Follow up appointment completed? no.   Was follow up appointment scheduled within 7 days of discharge? no.     Advance Care Planning:   Does patient have an Advance Directive:  currently not on file; education provided     CTN reviewed discharge instructions, medical action plan and red flags with patient and discussed any barriers to care and/or understanding of plan of care after discharge. Discussed appropriate site of care based on symptoms and resources available to patient including: PCP, Specialist, and Home Health. The patient agrees to contact the PCP office for questions related to their healthcare. [facility] follow [facility] appointment(s):   Future Appointments   Date Time Provider Mor Burciaga   3/8/2023  1:30 PM Tiffany Waggoner MD [facility] BS AMB   2023  1:30 PM Eder Nuñez MD [address] BS [facility]     Non-[facility] follow up appointment(s): Ortho fu appt 23    CTN provided contact information for future needs. Plan for follow-up call in 7-10 days based on severity of symptoms and risk factors. Goals Addressed                   This Visit's Progress     Attends follow-up appointments as directed. 23-Patient will schedule appt with Ortho fu appt for within two weeks-CDT  2/15/23-Patient has ortho fu appt on 23 and pcp appt on 3/8/23-CDT       Knowledge and adherence of prescribed medication (ie. action, side effects, missed dose, etc.). 2/8/23-Patient will adhere to prescribed pain medications and will notify MD with any side effects. -CDT  2/15/23-Patient no longer taking pain (tramadol) medications at this time will start tylenol PRN-CDT       Supportive resources in place to maintain patient in the community (ie.  Home Health, DME equipment, refer to, medication assistant plan, etc.)        2/8/23-Patient will adhere to Therapy as prescribed with At [address]  2/15/21-Patient continue to adhere to [facility] orders with [facility], patient will start with outpatient therapy in 1 week-CDT

## 2023-02-22 ENCOUNTER — PATIENT OUTREACH (OUTPATIENT)
Dept: CASE MANAGEMENT | Age: 68
End: 2023-02-22

## 2023-02-22 NOTE — PROGRESS NOTES
Goals        Attends follow-up appointments as directed. 2/8/23-Patient will schedule appt with Ortho fu appt for within two weeks-CDT  2/15/23-Patient has ortho fu appt on 2/21/23 and pcp appt on 3/8/23-CDT  2/22/23-Patient has followed up with [facility] on 2/21, patient has PCP appt on 3/8/23-CDT       Knowledge and adherence of prescribed medication (ie. action, side effects, missed dose, etc.).      2/8/23-Patient will adhere to prescribed pain medications and will notify MD with any side effects. -CDT  2/15/23-Patient no longer taking pain (tramadol) medications at this time will start tylenol PRN-CDT  2/22/23-Patient continue to adhere to pain medication (tylenol)-CDT       Supportive resources in place to maintain patient in the community (ie.  Home Health, DME equipment, refer to, medication assistant plan, etc.)      2/8/23-Patient will adhere to Therapy as prescribed with At [address]  2/15/21-Patient continue to adhere to [place] orders with AT [facility] [place], patient will start with outpatient therapy in 1 week-CDT  2/22/23-Patient has completed HH with At [facility], patient will begin oupatient therapy on 2/24/23-CDT

## 2023-03-08 ENCOUNTER — OFFICE VISIT (OUTPATIENT)
Dept: FAMILY MEDICINE CLINIC | Age: 68
End: 2023-03-08
Payer: MEDICARE

## 2023-03-08 ENCOUNTER — PATIENT OUTREACH (OUTPATIENT)
Dept: CASE MANAGEMENT | Age: 68
End: 2023-03-08

## 2023-03-08 VITALS
HEART RATE: 66 BPM | WEIGHT: 217 LBS | HEIGHT: 70 IN | BODY MASS INDEX: 31.07 KG/M2 | TEMPERATURE: 98.4 F | OXYGEN SATURATION: 98 % | SYSTOLIC BLOOD PRESSURE: 125 MMHG | DIASTOLIC BLOOD PRESSURE: 63 MMHG | RESPIRATION RATE: 17 BRPM

## 2023-03-08 DIAGNOSIS — Z79.4 TYPE 2 DIABETES MELLITUS WITHOUT COMPLICATION, WITH LONG-TERM CURRENT USE OF INSULIN (HCC): Primary | ICD-10-CM

## 2023-03-08 DIAGNOSIS — E11.9 TYPE 2 DIABETES MELLITUS WITHOUT COMPLICATION, WITH LONG-TERM CURRENT USE OF INSULIN (HCC): Primary | ICD-10-CM

## 2023-03-08 DIAGNOSIS — I10 ESSENTIAL HYPERTENSION, BENIGN: ICD-10-CM

## 2023-03-08 DIAGNOSIS — Z96.642 STATUS POST LEFT HIP REPLACEMENT: ICD-10-CM

## 2023-03-08 PROCEDURE — 1101F PT FALLS ASSESS-DOCD LE1/YR: CPT | Performed by: FAMILY MEDICINE

## 2023-03-08 PROCEDURE — 3074F SYST BP LT 130 MM HG: CPT | Performed by: FAMILY MEDICINE

## 2023-03-08 PROCEDURE — 2022F DILAT RTA XM EVC RTNOPTHY: CPT | Performed by: FAMILY MEDICINE

## 2023-03-08 PROCEDURE — G8417 CALC BMI ABV UP PARAM F/U: HCPCS | Performed by: FAMILY MEDICINE

## 2023-03-08 PROCEDURE — G0463 HOSPITAL OUTPT CLINIC VISIT: HCPCS | Performed by: FAMILY MEDICINE

## 2023-03-08 PROCEDURE — 3017F COLORECTAL CA SCREEN DOC REV: CPT | Performed by: FAMILY MEDICINE

## 2023-03-08 PROCEDURE — 1123F ACP DISCUSS/DSCN MKR DOCD: CPT | Performed by: FAMILY MEDICINE

## 2023-03-08 PROCEDURE — 3051F HG A1C>EQUAL 7.0%<8.0%: CPT | Performed by: FAMILY MEDICINE

## 2023-03-08 PROCEDURE — G8427 DOCREV CUR MEDS BY ELIG CLIN: HCPCS | Performed by: FAMILY MEDICINE

## 2023-03-08 PROCEDURE — G8510 SCR DEP NEG, NO PLAN REQD: HCPCS | Performed by: FAMILY MEDICINE

## 2023-03-08 PROCEDURE — 1111F DSCHRG MED/CURRENT MED MERGE: CPT | Performed by: FAMILY MEDICINE

## 2023-03-08 PROCEDURE — G8536 NO DOC ELDER MAL SCRN: HCPCS | Performed by: FAMILY MEDICINE

## 2023-03-08 PROCEDURE — 3078F DIAST BP <80 MM HG: CPT | Performed by: FAMILY MEDICINE

## 2023-03-08 PROCEDURE — 99213 OFFICE O/P EST LOW 20 MIN: CPT | Performed by: FAMILY MEDICINE

## 2023-03-08 NOTE — PROGRESS NOTES
1. \"Have you been to the ER, urgent care clinic since your last visit? Hospitalized since your last visit? \" No    2. \"Have you seen or consulted any other health care providers outside of the [address] [place] [address] since your last visit? \" No     3. For patients aged 39-70: Has the patient had a colonoscopy / FIT/ Cologuard? Yes - Care Gap present. Most recent result on file      If the patient is female:    4. For patients aged 41-77: Has the patient had a mammogram within the past 2 years? NA - based on age or sex      11. For patients aged 21-65: Has the patient had a pap smear?  NA - based on age or sex    Health Maintenance Due   Topic Date Due    Shingles Vaccine (1 of 2) Never done    DTaP/Tdap/Td series (2 - Td or Tdap) 02/24/2020    COVID-19 Vaccine (4 - Booster for Jerald [place] series) 02/05/2022     Chief Complaint   Patient presents with    Diabetes     Follow up     Visit Vitals  /63 (BP 1 Location: Left upper arm, BP Patient Position: Sitting, BP Cuff Size: Adult long)   Pulse 66   Temp 98.4 °F (36.9 °C) (Temporal)   Resp 17   Ht 5' 10\" (1.778 m)   Wt 217 lb (98.4 kg)   SpO2 98%   BMI 31.14 kg/m²

## 2023-03-08 NOTE — PROGRESS NOTES
Chief Complaint   Patient presents with    Diabetes     Follow up     Pt is still in outpatient PT from recent left hip surgery. Pt has lab work done in preparation for surgery. Pt is also followed by endocrinology, Dr. Diana Han, has an appt in May. Subjective: (As above and below)     Chief Complaint   Patient presents with    Diabetes     Follow up     he is a 69y.o. year old male who presents for evaluation. Reviewed PmHx, RxHx, FmHx, SocHx, AllgHx and updated in chart. Review of Systems - negative except as listed above    Objective:     Vitals:    03/08/23 1331   BP: 125/63   Pulse: 66   Resp: 17   Temp: 98.4 °F (36.9 °C)   TempSrc: Temporal   SpO2: 98%   Weight: 217 lb (98.4 kg)   Height: 5' 10\" (1.778 m)     Physical Examination: General appearance - alert, well appearing, and in no distress  Mental status - normal mood, behavior, speech, dress, motor activity, and thought processes  Ears - bilateral TM's and external ear canals normal  Chest - clear to auscultation, no wheezes, rales or rhonchi, symmetric air entry  Heart - normal rate, regular rhythm, normal S1, S2, no murmurs, rubs, clicks or gallops  Musculoskeletal - walking with a cane due to hip replacement   Extremities - peripheral pulses normal, no pedal edema, no clubbing or cyanosis    Assessment/ Plan:   1. Type 2 diabetes mellitus without complication, with long-term current use of insulin ([address]  -followed by endocrine, last HgA1c is 7.8    2. Essential hypertension, benign  -well controlled    3. Status post left hip replacement  -continue outpatient PT  -followed by ortho       I have discussed the diagnosis with the patient and the intended plan as seen in the above orders. The patient has received an after-visit summary and questions were answered concerning future plans.      Medication Side Effects and Warnings were discussed with patient: yes  Patient Labs were reviewed: yes  Patient Past Records were reviewed: yes    Wanda David M.D.

## 2023-03-08 NOTE — PROGRESS NOTES
Patient has graduated from the Transitions of Care Coordination  program on 3/8/23. Patient/family has the ability to self-manage at this time Care management goals have been completed. Patient was not referred to the [facility] team for further management. Goals Addressed                   This Visit's Progress     COMPLETED: Attends follow-up appointments as directed. 2/8/23-Patient will schedule appt with Ortho fu appt for within two weeks-CDT  2/15/23-Patient has ortho fu appt on 2/21/23 and pcp appt on 3/8/23-CDT  2/22/23-Patient has followed up with [facility] on 2/21, patient has PCP appt on 3/8/23-CDT       COMPLETED: Knowledge and adherence of prescribed medication (ie. action, side effects, missed dose, etc.).        2/8/23-Patient will adhere to prescribed pain medications and will notify MD with any side effects. -CDT  2/15/23-Patient no longer taking pain (tramadol) medications at this time will start tylenol PRN-CDT  2/22/23-Patient continue to adhere to pain medication (tylenol)-CDT       COMPLETED: Supportive resources in place to maintain patient in the community (ie. Home Health, DME equipment, refer to, medication assistant plan, etc.)        2/8/23-Patient will adhere to Therapy as prescribed with At [address]  2/15/21-Patient continue to adhere to [facility] orders with AT [facility], patient will start with outpatient therapy in 1 week-CDT  2/22/23-Patient has completed HH with At [address], patient will begin oupatient therapy on 2/24/23-CDT                Patient has Care Transition Nurse's contact information for any further questions, concerns, or needs.   Patients upcoming visits:    Future Appointments   Date Time Provider Mor Burciaga   3/8/2023  1:30 PM Christiano Waggoner MD [facility] BS AMB   6/6/2023  1:30 PM Genesis Wolf MD [facility] [address] BS AMB

## 2023-03-11 RX ORDER — DILTIAZEM HYDROCHLORIDE 360 MG/1
CAPSULE, EXTENDED RELEASE ORAL
Qty: 90 CAPSULE | Refills: 3 | Status: SHIPPED | OUTPATIENT
Start: 2023-03-11

## 2023-04-24 ENCOUNTER — TRANSCRIBE ORDER (OUTPATIENT)
Dept: SCHEDULING | Age: 68
End: 2023-04-24

## 2023-04-24 DIAGNOSIS — R97.20 ELEVATED PROSTATE SPECIFIC ANTIGEN (PSA): Primary | ICD-10-CM

## 2023-04-24 DIAGNOSIS — D07.5 PIN III (PROSTATIC INTRAEPITHELIAL NEOPLASIA III): ICD-10-CM

## 2023-05-04 ENCOUNTER — HOSPITAL ENCOUNTER (EMERGENCY)
Age: 68
Discharge: STILL ADMITTED OR ADMINISTRATIVE | End: 2023-05-04

## 2023-05-04 ENCOUNTER — HOSPITAL ENCOUNTER (OUTPATIENT)
Dept: MRI IMAGING | Age: 68
Discharge: HOME | End: 2023-05-04
Attending: UROLOGY
Payer: MEDICARE

## 2023-05-04 DIAGNOSIS — R97.20 ELEVATED PROSTATE SPECIFIC ANTIGEN (PSA): ICD-10-CM

## 2023-05-04 DIAGNOSIS — D07.5 PIN III (PROSTATIC INTRAEPITHELIAL NEOPLASIA III): ICD-10-CM

## 2023-05-04 PROCEDURE — 74011000258 HC RX REV CODE- 258: Performed by: UROLOGY

## 2023-05-04 PROCEDURE — 72197 MRI PELVIS W/O & W/DYE: CPT

## 2023-05-04 PROCEDURE — 74011000250 HC RX REV CODE- 250: Performed by: UROLOGY

## 2023-05-04 PROCEDURE — 74011250636 HC RX REV CODE- 250/636

## 2023-05-04 PROCEDURE — A9576 INJ PROHANCE MULTIPACK: HCPCS

## 2023-05-04 RX ORDER — SODIUM CHLORIDE 0.9 % (FLUSH) 0.9 %
10 SYRINGE (ML) INJECTION
Status: COMPLETED | OUTPATIENT
Start: 2023-05-04 | End: 2023-05-04

## 2023-05-04 RX ADMIN — GADOTERIDOL 20 ML: 279.3 INJECTION, SOLUTION INTRAVENOUS at 19:34

## 2023-05-04 RX ADMIN — SODIUM CHLORIDE, PRESERVATIVE FREE 10 ML: 5 INJECTION INTRAVENOUS at 19:34

## 2023-05-04 RX ADMIN — SODIUM CHLORIDE 100 ML: 9 INJECTION, SOLUTION INTRAVENOUS at 19:34

## 2023-05-15 ENCOUNTER — CLINICAL DOCUMENTATION (OUTPATIENT)
Age: 68
End: 2023-05-15

## 2023-05-20 DIAGNOSIS — E11.65 UNCONTROLLED TYPE 2 DIABETES MELLITUS WITH HYPERGLYCEMIA (HCC): ICD-10-CM

## 2023-05-20 DIAGNOSIS — I10 ESSENTIAL HYPERTENSION, BENIGN: ICD-10-CM

## 2023-05-20 DIAGNOSIS — E78.5 HYPERLIPIDEMIA LDL GOAL <100: ICD-10-CM

## 2023-05-20 DIAGNOSIS — E55.9 VITAMIN D DEFICIENCY: ICD-10-CM

## 2023-05-26 LAB
25(OH)D3+25(OH)D2 SERPL-MCNC: 66.3 NG/ML (ref 30–100)
BUN SERPL-MCNC: 17 MG/DL (ref 8–27)
BUN/CREAT SERPL: 19 (ref 10–24)
CALCIUM SERPL-MCNC: 9.6 MG/DL (ref 8.6–10.2)
CHLORIDE SERPL-SCNC: 101 MMOL/L (ref 96–106)
CO2 SERPL-SCNC: 26 MMOL/L (ref 20–29)
CREAT SERPL-MCNC: 0.89 MG/DL (ref 0.76–1.27)
EGFRCR SERPLBLD CKD-EPI 2021: 93 ML/MIN/1.73
EST. AVERAGE GLUCOSE BLD GHB EST-MCNC: 214 MG/DL
GLUCOSE SERPL-MCNC: 155 MG/DL (ref 70–99)
HBA1C MFR BLD: 9.1 % (ref 4.8–5.6)
POTASSIUM SERPL-SCNC: 4.6 MMOL/L (ref 3.5–5.2)
SODIUM SERPL-SCNC: 139 MMOL/L (ref 134–144)

## 2023-06-06 ENCOUNTER — OFFICE VISIT (OUTPATIENT)
Age: 68
End: 2023-06-06
Payer: MEDICARE

## 2023-06-06 VITALS
HEIGHT: 70 IN | WEIGHT: 230.4 LBS | SYSTOLIC BLOOD PRESSURE: 117 MMHG | DIASTOLIC BLOOD PRESSURE: 58 MMHG | HEART RATE: 68 BPM | BODY MASS INDEX: 32.99 KG/M2

## 2023-06-06 DIAGNOSIS — E55.9 VITAMIN D DEFICIENCY: ICD-10-CM

## 2023-06-06 DIAGNOSIS — E11.65 TYPE 2 DIABETES MELLITUS WITH HYPERGLYCEMIA, WITH LONG-TERM CURRENT USE OF INSULIN (HCC): Primary | ICD-10-CM

## 2023-06-06 DIAGNOSIS — I10 ESSENTIAL (PRIMARY) HYPERTENSION: ICD-10-CM

## 2023-06-06 DIAGNOSIS — Z79.4 TYPE 2 DIABETES MELLITUS WITH HYPERGLYCEMIA, WITH LONG-TERM CURRENT USE OF INSULIN (HCC): Primary | ICD-10-CM

## 2023-06-06 DIAGNOSIS — E78.2 MIXED HYPERLIPIDEMIA: ICD-10-CM

## 2023-06-06 PROCEDURE — 3017F COLORECTAL CA SCREEN DOC REV: CPT | Performed by: INTERNAL MEDICINE

## 2023-06-06 PROCEDURE — 1123F ACP DISCUSS/DSCN MKR DOCD: CPT | Performed by: INTERNAL MEDICINE

## 2023-06-06 PROCEDURE — 2022F DILAT RTA XM EVC RTNOPTHY: CPT | Performed by: INTERNAL MEDICINE

## 2023-06-06 PROCEDURE — 1036F TOBACCO NON-USER: CPT | Performed by: INTERNAL MEDICINE

## 2023-06-06 PROCEDURE — 95251 CONT GLUC MNTR ANALYSIS I&R: CPT | Performed by: INTERNAL MEDICINE

## 2023-06-06 PROCEDURE — 99214 OFFICE O/P EST MOD 30 MIN: CPT | Performed by: INTERNAL MEDICINE

## 2023-06-06 PROCEDURE — 3074F SYST BP LT 130 MM HG: CPT | Performed by: INTERNAL MEDICINE

## 2023-06-06 PROCEDURE — 3046F HEMOGLOBIN A1C LEVEL >9.0%: CPT | Performed by: INTERNAL MEDICINE

## 2023-06-06 PROCEDURE — G8417 CALC BMI ABV UP PARAM F/U: HCPCS | Performed by: INTERNAL MEDICINE

## 2023-06-06 PROCEDURE — G8427 DOCREV CUR MEDS BY ELIG CLIN: HCPCS | Performed by: INTERNAL MEDICINE

## 2023-06-06 PROCEDURE — 3078F DIAST BP <80 MM HG: CPT | Performed by: INTERNAL MEDICINE

## 2023-06-06 RX ORDER — FUROSEMIDE 20 MG/1
20 TABLET ORAL AS NEEDED
COMMUNITY

## 2023-06-06 RX ORDER — CELECOXIB 200 MG/1
200 CAPSULE ORAL DAILY
COMMUNITY

## 2023-06-06 NOTE — PROGRESS NOTES
Acids (FISH OIL PO) Take by mouth daily    celecoxib (CELEBREX) 200 MG capsule Take 1 capsule by mouth daily    furosemide (LASIX) 20 MG tablet Take 1 tablet by mouth 2 times daily    acetaminophen (TYLENOL) 650 MG extended release tablet Take by mouth every 8 hours    amLODIPine (NORVASC) 10 MG tablet Take by mouth daily    atorvastatin (LIPITOR) 10 MG tablet Take 1 tablet by mouth once daily    carvedilol (COREG) 6.25 MG tablet TAKE 1 TABLET BY MOUTH TWICE DAILY WITH MEALS    vitamin D3 (CHOLECALCIFEROL) 125 MCG (5000 UT) TABS tablet Take by mouth daily    dilTIAZem (TIAZAC) 360 MG extended release capsule Take 1 capsule by mouth once daily    glucosamine-chondroitin 500-400 MG CAPS Take 1 capsule by mouth daily    Insulin NPH Isophane & Regular (NOVOLIN 70/30 FLEXPEN) (70-30) 100 UNIT per ML injection pen Inject 50 units before breakfast, 15 units before lunch and 40 units before dinner + 5 units for every 50 mg/dl above 150 mg/dl--max 150 units/day--dispense relion if brand novolin is not covered    losartan-hydroCHLOROthiazide (HYZAAR) 100-12.5 MG per tablet Take 1 tablet by mouth once daily    metFORMIN (GLUCOPHAGE-XR) 500 MG extended release tablet TAKE 4 TABLETS BY MOUTH ONCE DAILY    tamsulosin (FLOMAX) 0.4 MG capsule Take by mouth daily     No current facility-administered medications for this visit. Allergies   Allergen Reactions    Empagliflozin Other (See Comments)     Yeast infection     Review of Systems: PER HPI    Physical Examination:  Blood pressure (!) 117/58, pulse 68, height 5' 10\" (1.778 m), weight 230 lb 6.4 oz (104.5 kg).   General: pleasant, no distress, good eye contact   Neck: no carotid bruits  Cardiovascular: regular, normal rate, nl s1 and s2, no m/r/g,   Respiratory: clear bilaterally  Integumentary: no edema,   Psychiatric: normal mood and affect    Data Reviewed:   Component      Latest Ref Rng & Units 5/25/2023 5/25/2023 5/25/2023          11:45 AM 11:45 AM 11:45 AM   Glucose,

## 2023-06-06 NOTE — PATIENT INSTRUCTIONS
1) Only take the furosemide (lasix) 20 mg as needed if you have any leg swelling. Otherwise do not take this as this can dehydrate you and cause your blood pressure to go too low. 2) Your Hemoglobin A1c (3 month test of blood sugar) yohana from 7.8% back to 9.1% due to more spikes due to diet and sometimes from overcorrecting for a low sugar. If your sugar is under 80, you only need about 4-6 oz of liquid sugar (milk, juice, regular soda, lemonade) rather than food as this takes too long to bring your sugar up and then it causes you to go high. 3) Your BUN and creatinine are markers of kidney function.   Your values are normal.    4) Your vitamin D is normal.

## 2023-06-30 ENCOUNTER — OFFICE VISIT (OUTPATIENT)
Age: 68
End: 2023-06-30
Payer: MEDICARE

## 2023-06-30 VITALS
TEMPERATURE: 98 F | DIASTOLIC BLOOD PRESSURE: 70 MMHG | WEIGHT: 226 LBS | HEART RATE: 68 BPM | RESPIRATION RATE: 18 BRPM | BODY MASS INDEX: 32.35 KG/M2 | SYSTOLIC BLOOD PRESSURE: 133 MMHG | HEIGHT: 70 IN | OXYGEN SATURATION: 97 %

## 2023-06-30 DIAGNOSIS — Z79.4 TYPE 2 DIABETES MELLITUS WITH HYPERGLYCEMIA, WITH LONG-TERM CURRENT USE OF INSULIN (HCC): ICD-10-CM

## 2023-06-30 DIAGNOSIS — R06.09 DOE (DYSPNEA ON EXERTION): Primary | ICD-10-CM

## 2023-06-30 DIAGNOSIS — E11.65 TYPE 2 DIABETES MELLITUS WITH HYPERGLYCEMIA, WITH LONG-TERM CURRENT USE OF INSULIN (HCC): ICD-10-CM

## 2023-06-30 DIAGNOSIS — Z79.4 TYPE 2 DIABETES MELLITUS WITHOUT COMPLICATION, WITH LONG-TERM CURRENT USE OF INSULIN (HCC): ICD-10-CM

## 2023-06-30 DIAGNOSIS — R68.89 EXERCISE INTOLERANCE: ICD-10-CM

## 2023-06-30 DIAGNOSIS — E11.9 TYPE 2 DIABETES MELLITUS WITHOUT COMPLICATION, WITH LONG-TERM CURRENT USE OF INSULIN (HCC): ICD-10-CM

## 2023-06-30 DIAGNOSIS — I10 ESSENTIAL (PRIMARY) HYPERTENSION: ICD-10-CM

## 2023-06-30 PROCEDURE — 1036F TOBACCO NON-USER: CPT | Performed by: FAMILY MEDICINE

## 2023-06-30 PROCEDURE — 3078F DIAST BP <80 MM HG: CPT | Performed by: FAMILY MEDICINE

## 2023-06-30 PROCEDURE — 99214 OFFICE O/P EST MOD 30 MIN: CPT | Performed by: FAMILY MEDICINE

## 2023-06-30 PROCEDURE — 3046F HEMOGLOBIN A1C LEVEL >9.0%: CPT | Performed by: FAMILY MEDICINE

## 2023-06-30 PROCEDURE — G8427 DOCREV CUR MEDS BY ELIG CLIN: HCPCS | Performed by: FAMILY MEDICINE

## 2023-06-30 PROCEDURE — 3017F COLORECTAL CA SCREEN DOC REV: CPT | Performed by: FAMILY MEDICINE

## 2023-06-30 PROCEDURE — 2022F DILAT RTA XM EVC RTNOPTHY: CPT | Performed by: FAMILY MEDICINE

## 2023-06-30 PROCEDURE — 3075F SYST BP GE 130 - 139MM HG: CPT | Performed by: FAMILY MEDICINE

## 2023-06-30 PROCEDURE — 1123F ACP DISCUSS/DSCN MKR DOCD: CPT | Performed by: FAMILY MEDICINE

## 2023-06-30 PROCEDURE — G8417 CALC BMI ABV UP PARAM F/U: HCPCS | Performed by: FAMILY MEDICINE

## 2023-06-30 SDOH — ECONOMIC STABILITY: INCOME INSECURITY: HOW HARD IS IT FOR YOU TO PAY FOR THE VERY BASICS LIKE FOOD, HOUSING, MEDICAL CARE, AND HEATING?: NOT HARD AT ALL

## 2023-06-30 SDOH — ECONOMIC STABILITY: FOOD INSECURITY: WITHIN THE PAST 12 MONTHS, THE FOOD YOU BOUGHT JUST DIDN'T LAST AND YOU DIDN'T HAVE MONEY TO GET MORE.: NEVER TRUE

## 2023-06-30 SDOH — ECONOMIC STABILITY: HOUSING INSECURITY
IN THE LAST 12 MONTHS, WAS THERE A TIME WHEN YOU DID NOT HAVE A STEADY PLACE TO SLEEP OR SLEPT IN A SHELTER (INCLUDING NOW)?: NO

## 2023-06-30 SDOH — ECONOMIC STABILITY: FOOD INSECURITY: WITHIN THE PAST 12 MONTHS, YOU WORRIED THAT YOUR FOOD WOULD RUN OUT BEFORE YOU GOT MONEY TO BUY MORE.: NEVER TRUE

## 2023-06-30 ASSESSMENT — PATIENT HEALTH QUESTIONNAIRE - PHQ9
2. FEELING DOWN, DEPRESSED OR HOPELESS: 0
SUM OF ALL RESPONSES TO PHQ QUESTIONS 1-9: 2
1. LITTLE INTEREST OR PLEASURE IN DOING THINGS: 0
SUM OF ALL RESPONSES TO PHQ QUESTIONS 1-9: 0
SUM OF ALL RESPONSES TO PHQ QUESTIONS 1-9: 2
SUM OF ALL RESPONSES TO PHQ QUESTIONS 1-9: 0
SUM OF ALL RESPONSES TO PHQ QUESTIONS 1-9: 2
SUM OF ALL RESPONSES TO PHQ QUESTIONS 1-9: 0
SUM OF ALL RESPONSES TO PHQ9 QUESTIONS 1 & 2: 2
SUM OF ALL RESPONSES TO PHQ QUESTIONS 1-9: 0
1. LITTLE INTEREST OR PLEASURE IN DOING THINGS: 1
SUM OF ALL RESPONSES TO PHQ QUESTIONS 1-9: 2
2. FEELING DOWN, DEPRESSED OR HOPELESS: 1
SUM OF ALL RESPONSES TO PHQ9 QUESTIONS 1 & 2: 0

## 2023-06-30 ASSESSMENT — LIFESTYLE VARIABLES
HOW MANY STANDARD DRINKS CONTAINING ALCOHOL DO YOU HAVE ON A TYPICAL DAY: PATIENT DOES NOT DRINK
HOW MANY STANDARD DRINKS CONTAINING ALCOHOL DO YOU HAVE ON A TYPICAL DAY: 1 OR 2
HOW OFTEN DO YOU HAVE A DRINK CONTAINING ALCOHOL: MONTHLY OR LESS
HOW OFTEN DO YOU HAVE A DRINK CONTAINING ALCOHOL: NEVER
HOW OFTEN DO YOU HAVE A DRINK CONTAINING ALCOHOL: MONTHLY OR LESS
HOW MANY STANDARD DRINKS CONTAINING ALCOHOL DO YOU HAVE ON A TYPICAL DAY: 1 OR 2

## 2023-07-05 RX ORDER — FUROSEMIDE 40 MG/1
TABLET ORAL
Qty: 90 TABLET | Refills: 3 | Status: SHIPPED | OUTPATIENT
Start: 2023-07-05

## 2023-07-10 ENCOUNTER — CLINICAL DOCUMENTATION (OUTPATIENT)
Age: 68
End: 2023-07-10

## 2023-07-10 NOTE — LETTER
5/21/2021 Mr. Ena Palacios [address] [place] [id number] Dear Ena Palacios: Please find your most recent results below. Resulted Orders METABOLIC PANEL, BASIC Result Value Ref Range Glucose 184 (H) 65 - 99 mg/dL BUN 10 8 - 27 mg/dL Creatinine 0.89 0.76 - 1.27 mg/dL GFR est non-AA 88 >59 mL/min/1.73 GFR est  >59 mL/min/1.73  
 BUN/Creatinine ratio 11 10 - 24 Sodium 138 134 - 144 mmol/L Potassium 4.2 3.5 - 5.2 mmol/L Chloride 100 96 - 106 mmol/L  
 CO2 25 20 - 29 mmol/L Calcium 9.7 8.6 - 10.2 mg/dL Narrative Performed at:  [address]  [id number] : Ciro Berger MD, Phone:  [phone] HEMOGLOBIN A1C WITH EAG Result Value Ref Range Hemoglobin A1c 11.5 (H) 4.8 - 5.6 % Estimated average glucose 283 mg/dL Narrative Performed at:  [address]  [id number] : Ciro Berger MD, Phone:  [phone] VITAMIN D, 25 HYDROXY Result Value Ref Range VITAMIN D, 25-HYDROXY 54.5 30.0 - 100.0 ng/mL Narrative Performed at:  [address]  [id number] : Ciro Berger MD, Phone:  [phone] 1) Keep checking your blood pressure over the next 2 weeks and see if it comes down under 140/90 once the shingles resolves. If so, then you don't need a change. If staying over 140 on the top number by the 1st week of June then let me know and we can increase the amlodipine to 10 mg daily. 2) Your Hemoglobin A1c (3 month test of blood sugar) joey from 11% to 11.5% likely due to stress but also from not getting the insulin on time leading to spikes. Do your best to take 50 units about 10-15 minutes before breakfast and dinner even if these times of the meals to vary day to day. 3) BUN and creatinine are markers of kidney function.   Your values are normal. 
 
4) Your vitamin D level is 54 which is normal.  Goal is over 30. Please continue to take your current dose of vitamin D to keep your levels at goal. 
 
5) Please come for a follow up visit on 12/3/21 at 11:30am in our [place] office. 6) Take the enclosed lab slip to repeat your labs prior to your next visit. Please call me if you have any questions: [phone] Sincerely, 
 
 
Chloe Alamo MD  
 [phone]

## 2023-07-20 ENCOUNTER — HOSPITAL ENCOUNTER (OUTPATIENT)
Facility: HOSPITAL | Age: 68
Discharge: HOME | End: 2023-07-22
Payer: MEDICARE

## 2023-07-20 DIAGNOSIS — R68.89 EXERCISE INTOLERANCE: ICD-10-CM

## 2023-07-20 DIAGNOSIS — R06.09 DOE (DYSPNEA ON EXERTION): ICD-10-CM

## 2023-07-20 LAB
EKG DIAGNOSIS: NORMAL
STRESS ANGINA INDEX: 0
STRESS BASELINE DIAS BP: 69 MMHG
STRESS BASELINE HR: 65 BPM
STRESS BASELINE ST DEPRESSION: 0 MM
STRESS BASELINE SYS BP: 153 MMHG
STRESS ESTIMATED WORKLOAD: 7.7 METS
STRESS O2 SAT PEAK: 95 %
STRESS O2 SAT REST: 97 %
STRESS PEAK DIAS BP: 66 MMHG
STRESS PEAK SYS BP: 180 MMHG
STRESS PERCENT HR ACHIEVED: 93 %
STRESS POST PEAK HR: 141 BPM
STRESS RATE PRESSURE PRODUCT: NORMAL BPM*MMHG
STRESS ST DEPRESSION: 0 MM
STRESS STAGE 1 BP: NORMAL MMHG
STRESS STAGE 1 DURATION: 3 MIN:SEC
STRESS STAGE 1 HR: 121 BPM
STRESS STAGE 2 BP: NORMAL MMHG
STRESS STAGE 2 DURATION: 3 MIN:SEC
STRESS STAGE 2 HR: 136 BPM
STRESS STAGE 3 DURATION: NORMAL MIN:SEC
STRESS STAGE 3 HR: 141 BPM
STRESS STAGE RECOVERY 1 BP: NORMAL MMHG
STRESS STAGE RECOVERY 1 DURATION: 5 MIN:SEC
STRESS STAGE RECOVERY 1 HR: 93 BPM
STRESS TARGET HR: 151 BPM

## 2023-07-20 PROCEDURE — 93017 CV STRESS TEST TRACING ONLY: CPT

## 2023-07-20 RX ORDER — FEXOFENADINE HCL 180 MG/1
180 TABLET ORAL DAILY
COMMUNITY

## 2023-07-24 ENCOUNTER — TELEPHONE (OUTPATIENT)
Age: 68
End: 2023-07-24

## 2023-07-24 NOTE — TELEPHONE ENCOUNTER
Exercise stress test normal.  No signs of heart strain or blood flow problems in the heart.   His heart is not responsible for his exercise intolerance

## 2023-08-08 ENCOUNTER — TELEPHONE (OUTPATIENT)
Age: 68
End: 2023-08-08

## 2023-08-08 RX ORDER — AMOXICILLIN 500 MG/1
2000 CAPSULE ORAL ONCE
Qty: 4 CAPSULE | Refills: 0 | Status: SHIPPED | OUTPATIENT
Start: 2023-08-08 | End: 2023-08-08

## 2023-08-08 NOTE — TELEPHONE ENCOUNTER
----- Message from [facility] sent at 8/7/2023  2:33 PM EDT -----  Subject: Message to Provider    QUESTIONS  Information for Provider? pt has a dentist appt for his appt 8/09/23 his   dentist said he needed an antibiotic before his appt   ---------------------------------------------------------------------------  --------------  [address] [place]  [phone]; OK to leave message on voicemail  ---------------------------------------------------------------------------  --------------  SCRIPT ANSWERS  Relationship to Patient?  Self

## 2023-08-09 RX ORDER — AMLODIPINE BESYLATE 10 MG/1
TABLET ORAL
Qty: 90 TABLET | Refills: 0 | Status: SHIPPED | OUTPATIENT
Start: 2023-08-09

## 2023-11-09 RX ORDER — AMLODIPINE BESYLATE 10 MG/1
TABLET ORAL
Qty: 90 TABLET | Refills: 3 | Status: SHIPPED | OUTPATIENT
Start: 2023-11-09

## 2023-11-13 ENCOUNTER — OFFICE VISIT (OUTPATIENT)
Age: 68
End: 2023-11-13
Payer: MEDICARE

## 2023-11-13 VITALS
DIASTOLIC BLOOD PRESSURE: 68 MMHG | TEMPERATURE: 98 F | BODY MASS INDEX: 33.73 KG/M2 | OXYGEN SATURATION: 96 % | HEART RATE: 70 BPM | HEIGHT: 70 IN | SYSTOLIC BLOOD PRESSURE: 145 MMHG | RESPIRATION RATE: 16 BRPM | WEIGHT: 235.6 LBS

## 2023-11-13 DIAGNOSIS — R09.81 NASAL CONGESTION: ICD-10-CM

## 2023-11-13 DIAGNOSIS — J06.9 UPPER RESPIRATORY TRACT INFECTION, UNSPECIFIED TYPE: Primary | ICD-10-CM

## 2023-11-13 DIAGNOSIS — R05.2 SUBACUTE COUGH: ICD-10-CM

## 2023-11-13 PROCEDURE — G8427 DOCREV CUR MEDS BY ELIG CLIN: HCPCS | Performed by: NURSE PRACTITIONER

## 2023-11-13 PROCEDURE — 3017F COLORECTAL CA SCREEN DOC REV: CPT | Performed by: NURSE PRACTITIONER

## 2023-11-13 PROCEDURE — 1036F TOBACCO NON-USER: CPT | Performed by: NURSE PRACTITIONER

## 2023-11-13 PROCEDURE — 99213 OFFICE O/P EST LOW 20 MIN: CPT | Performed by: NURSE PRACTITIONER

## 2023-11-13 PROCEDURE — 3077F SYST BP >= 140 MM HG: CPT | Performed by: NURSE PRACTITIONER

## 2023-11-13 PROCEDURE — G8417 CALC BMI ABV UP PARAM F/U: HCPCS | Performed by: NURSE PRACTITIONER

## 2023-11-13 PROCEDURE — 3078F DIAST BP <80 MM HG: CPT | Performed by: NURSE PRACTITIONER

## 2023-11-13 PROCEDURE — 1123F ACP DISCUSS/DSCN MKR DOCD: CPT | Performed by: NURSE PRACTITIONER

## 2023-11-13 PROCEDURE — G8484 FLU IMMUNIZE NO ADMIN: HCPCS | Performed by: NURSE PRACTITIONER

## 2023-11-13 RX ORDER — FLUTICASONE PROPIONATE 50 MCG
1 SPRAY, SUSPENSION (ML) NASAL 2 TIMES DAILY
Qty: 32 G | Refills: 3 | Status: SHIPPED | OUTPATIENT
Start: 2023-11-13

## 2023-11-13 RX ORDER — AMOXICILLIN AND CLAVULANATE POTASSIUM 875; 125 MG/1; MG/1
1 TABLET, FILM COATED ORAL 2 TIMES DAILY
Qty: 20 TABLET | Refills: 0 | Status: SHIPPED | OUTPATIENT
Start: 2023-11-13 | End: 2023-11-23

## 2023-11-13 RX ORDER — BENZONATATE 200 MG/1
200 CAPSULE ORAL 3 TIMES DAILY PRN
Qty: 21 CAPSULE | Refills: 0 | Status: SHIPPED | OUTPATIENT
Start: 2023-11-13 | End: 2023-11-20

## 2023-11-13 ASSESSMENT — PATIENT HEALTH QUESTIONNAIRE - PHQ9
SUM OF ALL RESPONSES TO PHQ9 QUESTIONS 1 & 2: 0
SUM OF ALL RESPONSES TO PHQ QUESTIONS 1-9: 0
2. FEELING DOWN, DEPRESSED OR HOPELESS: 0
1. LITTLE INTEREST OR PLEASURE IN DOING THINGS: 0
SUM OF ALL RESPONSES TO PHQ QUESTIONS 1-9: 0

## 2023-11-20 RX ORDER — ATORVASTATIN CALCIUM 10 MG/1
TABLET, FILM COATED ORAL
Qty: 90 TABLET | Refills: 3 | Status: SHIPPED | OUTPATIENT
Start: 2023-11-20

## 2023-11-23 DIAGNOSIS — E78.2 MIXED HYPERLIPIDEMIA: ICD-10-CM

## 2023-11-23 DIAGNOSIS — I10 ESSENTIAL (PRIMARY) HYPERTENSION: ICD-10-CM

## 2023-11-23 DIAGNOSIS — Z79.4 TYPE 2 DIABETES MELLITUS WITH HYPERGLYCEMIA, WITH LONG-TERM CURRENT USE OF INSULIN (HCC): ICD-10-CM

## 2023-11-23 DIAGNOSIS — E11.65 TYPE 2 DIABETES MELLITUS WITH HYPERGLYCEMIA, WITH LONG-TERM CURRENT USE OF INSULIN (HCC): ICD-10-CM

## 2023-11-28 LAB
ALBUMIN SERPL-MCNC: 4.7 G/DL (ref 3.9–4.9)
ALBUMIN/CREAT UR: <13 MG/G CREAT (ref 0–29)
ALBUMIN/GLOB SERPL: 1.3 {RATIO} (ref 1.2–2.2)
ALP SERPL-CCNC: 108 IU/L (ref 44–121)
ALT SERPL-CCNC: 11 IU/L (ref 0–44)
AST SERPL-CCNC: 20 IU/L (ref 0–40)
BILIRUB SERPL-MCNC: 0.4 MG/DL (ref 0–1.2)
BUN SERPL-MCNC: 14 MG/DL (ref 8–27)
BUN/CREAT SERPL: 14 (ref 10–24)
CALCIUM SERPL-MCNC: 10.1 MG/DL (ref 8.6–10.2)
CHLORIDE SERPL-SCNC: 101 MMOL/L (ref 96–106)
CO2 SERPL-SCNC: 21 MMOL/L (ref 20–29)
CREAT SERPL-MCNC: 1.02 MG/DL (ref 0.76–1.27)
CREAT UR-MCNC: 22.9 MG/DL
EGFRCR SERPLBLD CKD-EPI 2021: 79 ML/MIN/1.73
GLOBULIN SER CALC-MCNC: 3.6 G/DL (ref 1.5–4.5)
GLUCOSE SERPL-MCNC: 70 MG/DL (ref 70–99)
HBA1C MFR BLD: 9.3 % (ref 4.8–5.6)
MICROALBUMIN UR-MCNC: <3 UG/ML
POTASSIUM SERPL-SCNC: 4.4 MMOL/L (ref 3.5–5.2)
PROT SERPL-MCNC: 8.3 G/DL (ref 6–8.5)
SODIUM SERPL-SCNC: 141 MMOL/L (ref 134–144)

## 2023-11-29 LAB
CHOLEST SERPL-MCNC: 127 MG/DL (ref 100–199)
HDLC SERPL-MCNC: 48 MG/DL
IMP & REVIEW OF LAB RESULTS: NORMAL
LDLC SERPL CALC-MCNC: 68 MG/DL (ref 0–99)
Lab: NORMAL
TRIGL SERPL-MCNC: 50 MG/DL (ref 0–149)
VLDLC SERPL CALC-MCNC: 11 MG/DL (ref 5–40)

## 2023-12-01 RX ORDER — LOSARTAN POTASSIUM AND HYDROCHLOROTHIAZIDE 12.5; 1 MG/1; MG/1
TABLET ORAL
Qty: 90 TABLET | Refills: 3 | Status: SHIPPED | OUTPATIENT
Start: 2023-12-01

## 2023-12-08 ENCOUNTER — OFFICE VISIT (OUTPATIENT)
Age: 68
End: 2023-12-08
Payer: MEDICARE

## 2023-12-08 VITALS
SYSTOLIC BLOOD PRESSURE: 138 MMHG | BODY MASS INDEX: 33.64 KG/M2 | WEIGHT: 235 LBS | DIASTOLIC BLOOD PRESSURE: 64 MMHG | HEIGHT: 70 IN | HEART RATE: 75 BPM

## 2023-12-08 DIAGNOSIS — E11.65 TYPE 2 DIABETES MELLITUS WITH HYPERGLYCEMIA, WITH LONG-TERM CURRENT USE OF INSULIN (HCC): Primary | ICD-10-CM

## 2023-12-08 DIAGNOSIS — Z79.4 TYPE 2 DIABETES MELLITUS WITH HYPERGLYCEMIA, WITH LONG-TERM CURRENT USE OF INSULIN (HCC): Primary | ICD-10-CM

## 2023-12-08 DIAGNOSIS — I10 ESSENTIAL (PRIMARY) HYPERTENSION: ICD-10-CM

## 2023-12-08 DIAGNOSIS — E55.9 VITAMIN D DEFICIENCY: ICD-10-CM

## 2023-12-08 DIAGNOSIS — E78.2 MIXED HYPERLIPIDEMIA: ICD-10-CM

## 2023-12-08 PROCEDURE — 3046F HEMOGLOBIN A1C LEVEL >9.0%: CPT | Performed by: INTERNAL MEDICINE

## 2023-12-08 PROCEDURE — 3075F SYST BP GE 130 - 139MM HG: CPT | Performed by: INTERNAL MEDICINE

## 2023-12-08 PROCEDURE — 99214 OFFICE O/P EST MOD 30 MIN: CPT | Performed by: INTERNAL MEDICINE

## 2023-12-08 PROCEDURE — G8417 CALC BMI ABV UP PARAM F/U: HCPCS | Performed by: INTERNAL MEDICINE

## 2023-12-08 PROCEDURE — 1123F ACP DISCUSS/DSCN MKR DOCD: CPT | Performed by: INTERNAL MEDICINE

## 2023-12-08 PROCEDURE — 1036F TOBACCO NON-USER: CPT | Performed by: INTERNAL MEDICINE

## 2023-12-08 PROCEDURE — 3078F DIAST BP <80 MM HG: CPT | Performed by: INTERNAL MEDICINE

## 2023-12-08 PROCEDURE — G8484 FLU IMMUNIZE NO ADMIN: HCPCS | Performed by: INTERNAL MEDICINE

## 2023-12-08 PROCEDURE — 2022F DILAT RTA XM EVC RTNOPTHY: CPT | Performed by: INTERNAL MEDICINE

## 2023-12-08 PROCEDURE — G8427 DOCREV CUR MEDS BY ELIG CLIN: HCPCS | Performed by: INTERNAL MEDICINE

## 2023-12-08 PROCEDURE — 3017F COLORECTAL CA SCREEN DOC REV: CPT | Performed by: INTERNAL MEDICINE

## 2023-12-08 RX ORDER — ALFUZOSIN HYDROCHLORIDE 10 MG/1
10 TABLET, EXTENDED RELEASE ORAL DAILY
COMMUNITY

## 2023-12-08 RX ORDER — FUROSEMIDE 20 MG/1
20 TABLET ORAL DAILY
COMMUNITY

## 2023-12-08 NOTE — PROGRESS NOTES
Chief Complaint   Patient presents with    Diabetes     PCP and pharmacy confirmed      History of Present Illness: Valdo Montilla is a 77 y.o. male here for follow up of diabetes. Weight up 5 lbs since last visit in 6/23. Forgot to bring his [address] reader today. Has been taking the laisx 20 mg daily not as needed but is not having too many low blood pressure readings at this time. Fasting sugars are 160-200. Has still been off schedule and has not been waking up until closer to 10-11am and this is when he takes his first dose. Still grieving the loss of his wife in 8/22. Has also lost a few other people close to him recently. Compliant with BP and lipid regimen and vitamin D. He asked for a referral for nutritional education and placed this to the diabetes team through [facility].      he has the following indications to continue treatment with Freestyle Mya:   1) he has type 2 diabetes (E11.65) and is on an intensive insulin regimen with 3 injections per day   2) he currently test his blood sugar 4-6 times daily on his [address] and makes treatment decisions off his freestyle mya sensor readings   3) he requires frequent adjustments to his insulin injection doses based on his freestyle mya sensor readings   4) he has benefitted from therapeutic continuous glucose monitoring and I recommend that he continue this   5) he is seen in my office every 6 months        Current Outpatient Medications   Medication Sig    furosemide (LASIX) 20 MG tablet Take 1 tablet by mouth daily    alfuzosin (UROXATRAL) 10 MG extended release tablet Take 1 tablet by mouth daily    losartan-hydroCHLOROthiazide (HYZAAR) 100-12.5 MG per tablet Take 1 tablet by mouth once daily    atorvastatin (LIPITOR) 10 MG tablet Take 1 tablet by mouth once daily    fluticasone (FLONASE) 50 MCG/ACT nasal spray 1 spray by Each Nostril route in the morning and at bedtime    amLODIPine (NORVASC) 10 MG tablet Take 1 tablet by mouth once daily

## 2023-12-08 NOTE — PATIENT INSTRUCTIONS
1) Your A1c has risen from 7.8% in 1/23 up to 9.1% in 5/23 and 9.3% currently. We'll work to get this back under 7.5%.     2) Do your best to try and get your first dose of insulin no later than 9-9:30am and then eat at that time so you don't cause a low sugar and I hope this will help get you back on schedule to improve your A1c.    3) Your blood pressure and cholesterol and liver and kidney and urine are all normal.

## 2024-01-04 ENCOUNTER — NURSE ONLY (OUTPATIENT)
Age: 69
End: 2024-01-04

## 2024-01-04 DIAGNOSIS — E11.65 TYPE 2 DIABETES MELLITUS WITH HYPERGLYCEMIA, WITH LONG-TERM CURRENT USE OF INSULIN (HCC): Primary | ICD-10-CM

## 2024-01-04 DIAGNOSIS — Z79.4 TYPE 2 DIABETES MELLITUS WITH HYPERGLYCEMIA, WITH LONG-TERM CURRENT USE OF INSULIN (HCC): Primary | ICD-10-CM

## 2024-01-05 NOTE — PROGRESS NOTES
care record to keep abreast of diabetes health Yes              Hannah Stokes RN, [facility] on 1/5/2024 at 8:29 AM    I have personally reviewed the health record, including provider notes, laboratory data and current medications before making these care and education recommendations. The time spent in this effort is included in the total time.  Total minutes: 125

## 2024-01-11 ENCOUNTER — NURSE ONLY (OUTPATIENT)
Age: 69
End: 2024-01-11

## 2024-01-11 DIAGNOSIS — E11.65 TYPE 2 DIABETES MELLITUS WITH HYPERGLYCEMIA, WITH LONG-TERM CURRENT USE OF INSULIN (HCC): Primary | ICD-10-CM

## 2024-01-11 DIAGNOSIS — Z79.4 TYPE 2 DIABETES MELLITUS WITH HYPERGLYCEMIA, WITH LONG-TERM CURRENT USE OF INSULIN (HCC): Primary | ICD-10-CM

## 2024-01-11 NOTE — PROGRESS NOTES
[facility]  Diabetes Self-Management Education & Support Program  Encounter Note      SUMMARY  Diabetes self-care management training was completed related to healthy eating and monitoring. he participant will return in 1 week to continue DSMES related to taking medications and physical activity. The participant did identify SMART Goal(s) and will practice knowledge and skills related to healthy eating and monitoring to improve diabetes self-management.      EVALUATION:  Micheal Tejeda actively participated in group class.  Participated in reading food labels and correctly identifying CHO content in different portion sizes of CHO, protein and fat.  Discussed the importance of eating a healthy plate to help with BG control Participated in reading food labels and correctly identifying CHO content in different portion sizes of CHO, protein and fat.  Discussed the importance of eating a healthy plate to help with BG control. Provided community resource guide and menu planning to help with affording food as well as other financial concerns.    RECOMMENDATIONS:  Participant to work on SMART goal. Call [facility] if education questions or concerns arise.   Contact provider with any medical concerns.     TOPICS DISCUSSED TODAY:  WHAT CAN I EAT? 60  HOW CAN BLOOD GLUCOSE MONITORING HELP ME? 60      Next provider visit is scheduled for   Future Appointments   Date Time Provider Department Center   1/18/2024  8:30 AM DIABETES GROUP CLASS [facility]   1/25/2024  8:30 AM DIABETES GROUP CLASS [facility]   6/4/2024  1:30 PM Romeo Shukla MD [facility]          SMART GOAL(S)   Exercise 4 days this week by doing a variety of exercises.   ACHIEVEMENT OF GOAL(S) : 0-24%         DATE DSMES TOPIC EVALUATION     1/11/2024 WHAT CAN I EAT?   General principles   Determining a healthy weight   Nutritional terms & tools   Healthy Plate method   Carbohydrate Counting   Reading

## 2024-01-18 ENCOUNTER — NURSE ONLY (OUTPATIENT)
Age: 69
End: 2024-01-18

## 2024-01-18 DIAGNOSIS — E11.65 TYPE 2 DIABETES MELLITUS WITH HYPERGLYCEMIA, WITH LONG-TERM CURRENT USE OF INSULIN (HCC): Primary | ICD-10-CM

## 2024-01-18 DIAGNOSIS — Z79.4 TYPE 2 DIABETES MELLITUS WITH HYPERGLYCEMIA, WITH LONG-TERM CURRENT USE OF INSULIN (HCC): Primary | ICD-10-CM

## 2024-01-18 NOTE — PROGRESS NOTES
[facility]  Diabetes Self-Management Education & Support Program  Encounter Note      SUMMARY  Diabetes self-care management training was completed related to taking medications and physical activity. The participant will return in 1 week to continue DSMES related to healthy coping and problem solving. The participant did identify SMART Goal(s) and will practice knowledge and skills related to being active and medications to improve diabetes self-management.      EVALUATION:     Micheal Tejeda actively participated in group class.  Demonstrated chair exercises that incorporated balance, pilates, breathing, and core strength  Discussed easy ways to get exercise into their lifestyle.      RECOMMENDATIONS:  Participant to work on SMART goal listed below. Call [facility] if education questions or concerns arise. Contact provider with any medical concerns.      TOPICS DISCUSSED TODAY:  HOW DO MY DIABETES MEDICATIONS WORK? 60  HOW DOES PHYSICAL ACTIVITY AFFECT MY DIABETES? 60      Next provider visit is scheduled for   Future Appointments   Date Time Provider Department Center   1/25/2024  8:30 AM DIABETES GROUP CLASS [facility]   6/4/2024  1:30 PM Romeo Shukla MD [facility] [address] [facility] AM[facility]            SMART GOAL(S)   Exercise 4 days this week by doing a variety of exercises.   ACHIEVEMENT OF GOAL(S) : 0-24%               DATE DSMES TOPIC EVALUATION     1/18/2024 HOW DO MY DIABETES MEDICATIONS WORK?   Type 1 medications & methods   Insulin injections   Injection sites   Type 2 medications   Oral agents   GLP-1 agonists   Hypoglycemia symptoms & treatment   Glucagon emergency kits   General guidance regarding insulin use whether Type 1, 2 or gestational diabetes   Storage of insulin   Disposal    Traveling with medications   Barriers to medication adherence      The participant   Can describe the expected action & side effects of prescribed diabetes medications: Yes         DATE

## 2024-01-25 ENCOUNTER — NURSE ONLY (OUTPATIENT)
Age: 69
End: 2024-01-25

## 2024-01-25 DIAGNOSIS — E11.65 TYPE 2 DIABETES MELLITUS WITH HYPERGLYCEMIA, WITH LONG-TERM CURRENT USE OF INSULIN (HCC): Primary | ICD-10-CM

## 2024-01-25 DIAGNOSIS — Z79.4 TYPE 2 DIABETES MELLITUS WITH HYPERGLYCEMIA, WITH LONG-TERM CURRENT USE OF INSULIN (HCC): Primary | ICD-10-CM

## 2024-01-25 NOTE — PROGRESS NOTES
[facility]  Diabetes Self-Management Education & Support Program  Encounter Note      SUMMARY  Diabetes self-care management training was completed related to healthy coping and problem solving. The participant will return on March 08 to continue DSMES related to 6 week follow up appointment. The participant  will practice knowledge and skills related to healthy coping and problem solving to improve diabetes self-management.      EVALUATION:  Participant verbalized understanding and recognition of various problem solving techniques, BG patterns, and trends. Participant can verbalize understanding of hypoglycemia protocols, Rule of 15, sick day management, and emergency preparedness plan/kit. Encouraged participant to regularly practice stress management techniques.     RECOMMENDATIONS:  May benefit from practicing problem solving and stress management techniques.   Establish an emergency plan/kit.   Continue to work on SMART goal.     TOPICS DISCUSSED TODAY:  HOW DO I FIND SUPPORT TO TACKLE THIS CONDITION? 60  HOW DO I FIGURE OUT WHAT'S INFLUENCING MY BLOOD GLUCOSES? 60       SMART GOAL(S)   Increase physical activity this week by sitting less and moving more and performing a variety of exercises 4 days this week.   ACHIEVEMENT OF GOAL(S) : %         DATE DSMES TOPIC EVALUATION     1/25/2024 HOW DO I FIND SUPPORT TO TACKLE THIS CONDITION?   Normal responses to diabetes diagnosis or complication   Shock   Anger & resentment   Guilt/self-blame   Sadness & worry   Depression    Anxiety   Pregnancy   Constructive strategies to normal responses    Exploring feelings & attitudes   Motivation: Cost versus benefits analysis   Problem-solving: Chain analysis   Obtaining support: Communicating   Family & friends   Health team   iii. Community   Stress   Symptoms   Managing stress   Fill your tool kit        The participant can identify people that support them in caring for their diabetes

## 2024-02-19 RX ORDER — HUMAN INSULIN 100 [IU]/ML
INJECTION, SUSPENSION SUBCUTANEOUS
Qty: 45 ML | Refills: 11 | Status: SHIPPED | OUTPATIENT
Start: 2024-02-19

## 2024-03-08 ENCOUNTER — TELEMEDICINE (OUTPATIENT)
Age: 69
End: 2024-03-08

## 2024-03-08 DIAGNOSIS — E11.65 TYPE 2 DIABETES MELLITUS WITH HYPERGLYCEMIA, WITH LONG-TERM CURRENT USE OF INSULIN (HCC): Primary | ICD-10-CM

## 2024-03-08 DIAGNOSIS — Z79.4 TYPE 2 DIABETES MELLITUS WITH HYPERGLYCEMIA, WITH LONG-TERM CURRENT USE OF INSULIN (HCC): Primary | ICD-10-CM

## 2024-03-08 NOTE — PROGRESS NOTES
[facility]  Diabetes Self-Management Education & Support Program  Post-program Evaluation    EVALUATION @ COMPLETION OF THE PROGRAM    Micheal Tejeda completed 9 hours of diabetes self-management education. The participant acquired new knowledge and demonstrated new skills during the program.     The participant worked on the following SMART GOAL(s) to improve their diabetes self-management:     Exercise 4 days this week by doing a variety of exercises.   ACHIEVEMENT OF GOAL(S) : %       The participant's pre-program A1c was   Lab Results   Component Value Date/Time    LABA1C 9.3 11/27/2023 11:41 AM   The post-evaluation A1c has not been drawn.    The participant improved their Diabetes Skills, Confidence and Preparedness Index (scored out of 7):    Total score: 6.7  Skills: 6.7  Confidence: 6.7  Preparedness: 6.6    FINAL RECOMMENDATIONS:  Continue to follow up with Endocrinologist and PCP with any concerns.     Next provider visit is scheduled for   Future Appointments   Date Time Provider Department Center   6/4/2024  1:30 PM Romeo Shukla MD [facility] [address] [facility] AMB         Danielle López RD on 3/8/2024     Metric Patient's responses (3/8/2024) Areas for improvement     Healthy Eating       The participant is using the Healthy Plate to control carbohydrate intake - Yes    The participant reads food labels accurately - Yes          Being Active       The participant performs physical activity where your heart beats faster and your breathing is harder than normal for 30 minutes or more? 3 day(s)     In a typical week, the participant performs physical activity 3 day(s)          Monitoring   The participant is monitoring their blood sugars? Yes    The participant is monitoring uses CGM- states average blood sugar is 144 mg/dL  The participant improved their A1c - unknown    The participant understands the meaning of the A1c - Yes        Taking Medications   The participant

## 2024-04-08 RX ORDER — CARVEDILOL 6.25 MG/1
TABLET ORAL
Qty: 180 TABLET | Refills: 0 | Status: SHIPPED | OUTPATIENT
Start: 2024-04-08

## 2024-04-08 RX ORDER — DILTIAZEM HYDROCHLORIDE 360 MG/1
CAPSULE, EXTENDED RELEASE ORAL
Qty: 90 CAPSULE | Refills: 3 | Status: SHIPPED | OUTPATIENT
Start: 2024-04-08

## 2024-05-24 DIAGNOSIS — E55.9 VITAMIN D DEFICIENCY: ICD-10-CM

## 2024-05-24 DIAGNOSIS — E78.2 MIXED HYPERLIPIDEMIA: ICD-10-CM

## 2024-05-24 DIAGNOSIS — Z79.4 TYPE 2 DIABETES MELLITUS WITH HYPERGLYCEMIA, WITH LONG-TERM CURRENT USE OF INSULIN (HCC): ICD-10-CM

## 2024-05-24 DIAGNOSIS — I10 ESSENTIAL (PRIMARY) HYPERTENSION: ICD-10-CM

## 2024-05-24 DIAGNOSIS — E11.65 TYPE 2 DIABETES MELLITUS WITH HYPERGLYCEMIA, WITH LONG-TERM CURRENT USE OF INSULIN (HCC): ICD-10-CM

## 2024-05-24 LAB
25(OH)D3+25(OH)D2 SERPL-MCNC: 60 NG/ML (ref 30–100)
BUN SERPL-MCNC: 14 MG/DL (ref 8–27)
BUN/CREAT SERPL: 16 (ref 10–24)
CALCIUM SERPL-MCNC: 9.2 MG/DL (ref 8.6–10.2)
CHLORIDE SERPL-SCNC: 104 MMOL/L (ref 96–106)
CO2 SERPL-SCNC: 23 MMOL/L (ref 20–29)
CREAT SERPL-MCNC: 0.89 MG/DL (ref 0.76–1.27)
EGFRCR SERPLBLD CKD-EPI 2021: 92 ML/MIN/1.73
GLUCOSE SERPL-MCNC: 138 MG/DL (ref 70–99)
HBA1C MFR BLD: 7.8 % (ref 4.8–5.6)
POTASSIUM SERPL-SCNC: 4.5 MMOL/L (ref 3.5–5.2)
SODIUM SERPL-SCNC: 140 MMOL/L (ref 134–144)

## 2024-06-04 ENCOUNTER — OFFICE VISIT (OUTPATIENT)
Age: 69
End: 2024-06-04
Payer: MEDICARE

## 2024-06-04 VITALS
SYSTOLIC BLOOD PRESSURE: 126 MMHG | DIASTOLIC BLOOD PRESSURE: 58 MMHG | HEART RATE: 55 BPM | HEIGHT: 70 IN | WEIGHT: 233.6 LBS | BODY MASS INDEX: 33.44 KG/M2

## 2024-06-04 DIAGNOSIS — E55.9 VITAMIN D DEFICIENCY: ICD-10-CM

## 2024-06-04 DIAGNOSIS — I10 ESSENTIAL (PRIMARY) HYPERTENSION: ICD-10-CM

## 2024-06-04 DIAGNOSIS — E78.2 MIXED HYPERLIPIDEMIA: ICD-10-CM

## 2024-06-04 DIAGNOSIS — E11.65 TYPE 2 DIABETES MELLITUS WITH HYPERGLYCEMIA, WITH LONG-TERM CURRENT USE OF INSULIN (HCC): Primary | ICD-10-CM

## 2024-06-04 DIAGNOSIS — Z79.4 TYPE 2 DIABETES MELLITUS WITH HYPERGLYCEMIA, WITH LONG-TERM CURRENT USE OF INSULIN (HCC): Primary | ICD-10-CM

## 2024-06-04 PROCEDURE — G8427 DOCREV CUR MEDS BY ELIG CLIN: HCPCS | Performed by: INTERNAL MEDICINE

## 2024-06-04 PROCEDURE — 1036F TOBACCO NON-USER: CPT | Performed by: INTERNAL MEDICINE

## 2024-06-04 PROCEDURE — G2211 COMPLEX E/M VISIT ADD ON: HCPCS | Performed by: INTERNAL MEDICINE

## 2024-06-04 PROCEDURE — 1123F ACP DISCUSS/DSCN MKR DOCD: CPT | Performed by: INTERNAL MEDICINE

## 2024-06-04 PROCEDURE — 99214 OFFICE O/P EST MOD 30 MIN: CPT | Performed by: INTERNAL MEDICINE

## 2024-06-04 PROCEDURE — 95251 CONT GLUC MNTR ANALYSIS I&R: CPT | Performed by: INTERNAL MEDICINE

## 2024-06-04 PROCEDURE — G8417 CALC BMI ABV UP PARAM F/U: HCPCS | Performed by: INTERNAL MEDICINE

## 2024-06-04 PROCEDURE — 3017F COLORECTAL CA SCREEN DOC REV: CPT | Performed by: INTERNAL MEDICINE

## 2024-06-04 PROCEDURE — 3051F HG A1C>EQUAL 7.0%<8.0%: CPT | Performed by: INTERNAL MEDICINE

## 2024-06-04 PROCEDURE — 2022F DILAT RTA XM EVC RTNOPTHY: CPT | Performed by: INTERNAL MEDICINE

## 2024-06-04 PROCEDURE — 3074F SYST BP LT 130 MM HG: CPT | Performed by: INTERNAL MEDICINE

## 2024-06-04 PROCEDURE — 3078F DIAST BP <80 MM HG: CPT | Performed by: INTERNAL MEDICINE

## 2024-06-04 NOTE — PATIENT INSTRUCTIONS
1) Please call [facility] and see if they can change you to the geri 2 or geri 3.  Call [phone] and ask them to fax me a form for your supplies and I'll complete this.    2) Your Hemoglobin A1c (3 month test of blood sugar) was much better at 7.8% down from 9.3% and I hope to get this closer to 7.5% or less next time.    3) BUN and creatinine are markers of kidney function.  Your values are normal.    4) Your blood pressure is controlled.    5) Your vitamin D is normal.      6) Try to move your injection locations more laterally or also use the upper and outer thighs as another location.

## 2024-06-04 NOTE — PROGRESS NOTES
Chief Complaint   Patient presents with    Diabetes     PCP and pharmacy confirmed     History of Present Illness: Micheal Tejeda is a 68 y.o. male here for follow up of diabetes.  Weight down 2 lbs since last visit in 12/23.  He has been more diligent about getting up and taking his first injection closer to 9-9:30am to help prevent morning rises.  Met with the diabetes team for education and found this very helpful to work on his diet.  Review of his mya data shows 61% in range and 35% above range and 4% below range.  Most of his spikes are after eating but much better than before.  Compliant with vitamin D and BP and lipid regimen.     he has the following indications to continue treatment with Freestyle Mya:   1) he has type 2 diabetes (E11.65) and is on an intensive insulin regimen with 3 injections per day   2) he currently test his blood sugar 4-6 times daily on his mya and makes treatment decisions off his freestyle mya sensor readings   3) he requires frequent adjustments to his insulin injection doses based on his freestyle mya sensor readings   4) he has benefitted from therapeutic continuous glucose monitoring and I recommend that he continue this   5) he is seen in my office every 6 months          Current Outpatient Medications   Medication Sig    dilTIAZem (TIAZAC) 360 MG extended release capsule Take 1 capsule by mouth once daily    carvedilol (COREG) 6.25 MG tablet TAKE 1 TABLET BY MOUTH TWICE DAILY WITH MEALS    Insulin NPH Isophane & Regular (NOVOLIN 70/30 FLEXPEN) (70-30) 100 UNIT per ML injection pen Inject 50 units before breakfast, 15 units before lunch and 40 units before dinner + 5 units for every 50 mg/dl above 150 mg/dl--max 150 units/day--dispense relion if brand novolin is not covered    furosemide (LASIX) 20 MG tablet Take 1 tablet by mouth daily    alfuzosin (UROXATRAL) 10 MG extended release tablet Take 1 tablet by mouth daily    losartan-hydroCHLOROthiazide (HYZAAR)

## 2024-07-14 RX ORDER — METFORMIN HYDROCHLORIDE 500 MG/1
TABLET, EXTENDED RELEASE ORAL
Qty: 360 TABLET | Refills: 3 | Status: SHIPPED | OUTPATIENT
Start: 2024-07-14

## 2024-07-14 RX ORDER — CARVEDILOL 6.25 MG/1
TABLET ORAL
Qty: 180 TABLET | Refills: 0 | Status: SHIPPED | OUTPATIENT
Start: 2024-07-14

## 2024-07-29 RX ORDER — FUROSEMIDE 40 MG/1
TABLET ORAL
Qty: 90 TABLET | Refills: 3 | Status: SHIPPED | OUTPATIENT
Start: 2024-07-29

## 2024-10-14 RX ORDER — CARVEDILOL 6.25 MG/1
TABLET ORAL
Qty: 180 TABLET | Refills: 0 | Status: SHIPPED | OUTPATIENT
Start: 2024-10-14

## 2024-11-26 DIAGNOSIS — Z79.4 TYPE 2 DIABETES MELLITUS WITH HYPERGLYCEMIA, WITH LONG-TERM CURRENT USE OF INSULIN (HCC): ICD-10-CM

## 2024-11-26 DIAGNOSIS — E78.2 MIXED HYPERLIPIDEMIA: ICD-10-CM

## 2024-11-26 DIAGNOSIS — E11.65 TYPE 2 DIABETES MELLITUS WITH HYPERGLYCEMIA, WITH LONG-TERM CURRENT USE OF INSULIN (HCC): ICD-10-CM

## 2024-11-26 DIAGNOSIS — I10 ESSENTIAL (PRIMARY) HYPERTENSION: ICD-10-CM

## 2024-11-26 DIAGNOSIS — E55.9 VITAMIN D DEFICIENCY: ICD-10-CM

## 2024-11-29 RX ORDER — ATORVASTATIN CALCIUM 10 MG/1
TABLET, FILM COATED ORAL
Qty: 90 TABLET | Refills: 0 | Status: SHIPPED | OUTPATIENT
Start: 2024-11-29

## 2024-11-30 ENCOUNTER — HOSPITAL ENCOUNTER (EMERGENCY)
Facility: HOSPITAL | Age: 69
Discharge: HOME | End: 2024-11-30
Attending: EMERGENCY MEDICINE
Payer: MEDICARE

## 2024-11-30 ENCOUNTER — APPOINTMENT (OUTPATIENT)
Facility: HOSPITAL | Age: 69
End: 2024-11-30
Payer: MEDICARE

## 2024-11-30 VITALS
HEIGHT: 71 IN | DIASTOLIC BLOOD PRESSURE: 62 MMHG | SYSTOLIC BLOOD PRESSURE: 133 MMHG | OXYGEN SATURATION: 96 % | RESPIRATION RATE: 16 BRPM | WEIGHT: 234.79 LBS | HEART RATE: 49 BPM | TEMPERATURE: 97.9 F | BODY MASS INDEX: 32.87 KG/M2

## 2024-11-30 DIAGNOSIS — R07.9 CHEST PAIN, UNSPECIFIED TYPE: Primary | ICD-10-CM

## 2024-11-30 LAB
ALBUMIN SERPL-MCNC: 3.9 G/DL (ref 3.5–5)
ALBUMIN SERPL-MCNC: 4.5 G/DL (ref 3.8–4.8)
ALBUMIN/CREAT UR: 15 MG/G CREAT (ref 0–29)
ALBUMIN/GLOB SERPL: 1 (ref 1.1–2.2)
ALP SERPL-CCNC: 101 U/L (ref 45–117)
ALP SERPL-CCNC: 106 IU/L (ref 44–121)
ALT SERPL-CCNC: 10 IU/L (ref 0–44)
ALT SERPL-CCNC: 17 U/L (ref 12–78)
ANION GAP SERPL CALC-SCNC: 4 MMOL/L (ref 2–12)
AST SERPL-CCNC: 16 U/L (ref 15–37)
AST SERPL-CCNC: 17 IU/L (ref 0–40)
BASOPHILS # BLD: 0 K/UL (ref 0–0.1)
BASOPHILS NFR BLD: 1 % (ref 0–1)
BILIRUB SERPL-MCNC: 0.3 MG/DL (ref 0–1.2)
BILIRUB SERPL-MCNC: 0.5 MG/DL (ref 0.2–1)
BUN SERPL-MCNC: 17 MG/DL (ref 6–20)
BUN SERPL-MCNC: 19 MG/DL (ref 8–27)
BUN/CREAT SERPL: 17 (ref 12–20)
BUN/CREAT SERPL: 20 (ref 10–24)
CALCIUM SERPL-MCNC: 9.2 MG/DL (ref 8.5–10.1)
CALCIUM SERPL-MCNC: 9.9 MG/DL (ref 8.6–10.2)
CHLORIDE SERPL-SCNC: 103 MMOL/L (ref 96–106)
CHLORIDE SERPL-SCNC: 107 MMOL/L (ref 97–108)
CHOLEST SERPL-MCNC: 126 MG/DL (ref 100–199)
CO2 SERPL-SCNC: 26 MMOL/L (ref 20–29)
CO2 SERPL-SCNC: 29 MMOL/L (ref 21–32)
CREAT SERPL-MCNC: 0.95 MG/DL (ref 0.76–1.27)
CREAT SERPL-MCNC: 1 MG/DL (ref 0.7–1.3)
CREAT UR-MCNC: 57.3 MG/DL
DIFFERENTIAL METHOD BLD: NORMAL
EGFRCR SERPLBLD CKD-EPI 2021: 86 ML/MIN/1.73
EOSINOPHIL # BLD: 0.1 K/UL (ref 0–0.4)
EOSINOPHIL NFR BLD: 1 % (ref 0–7)
ERYTHROCYTE [DISTWIDTH] IN BLOOD BY AUTOMATED COUNT: 13 % (ref 11.5–14.5)
GLOBULIN SER CALC-MCNC: 3.8 G/DL (ref 1.5–4.5)
GLOBULIN SER CALC-MCNC: 4.1 G/DL (ref 2–4)
GLUCOSE SERPL-MCNC: 100 MG/DL (ref 65–100)
GLUCOSE SERPL-MCNC: 85 MG/DL (ref 70–99)
HBA1C MFR BLD: 8.4 % (ref 4.8–5.6)
HCT VFR BLD AUTO: 39.5 % (ref 36.6–50.3)
HDLC SERPL-MCNC: 44 MG/DL
HGB BLD-MCNC: 13.5 G/DL (ref 12.1–17)
IMM GRANULOCYTES # BLD AUTO: 0 K/UL (ref 0–0.04)
IMM GRANULOCYTES NFR BLD AUTO: 0 % (ref 0–0.5)
IMP & REVIEW OF LAB RESULTS: NORMAL
LDLC SERPL CALC-MCNC: 70 MG/DL (ref 0–99)
LYMPHOCYTES # BLD: 1.9 K/UL (ref 0.8–3.5)
LYMPHOCYTES NFR BLD: 29 % (ref 12–49)
Lab: NORMAL
MCH RBC QN AUTO: 30.8 PG (ref 26–34)
MCHC RBC AUTO-ENTMCNC: 34.2 G/DL (ref 30–36.5)
MCV RBC AUTO: 90 FL (ref 80–99)
MICROALBUMIN UR-MCNC: 8.7 UG/ML
MONOCYTES # BLD: 0.6 K/UL (ref 0–1)
MONOCYTES NFR BLD: 9 % (ref 5–13)
NEUTS SEG # BLD: 4 K/UL (ref 1.8–8)
NEUTS SEG NFR BLD: 60 % (ref 32–75)
NRBC # BLD: 0 K/UL (ref 0–0.01)
NRBC BLD-RTO: 0 PER 100 WBC
PLATELET # BLD AUTO: 270 K/UL (ref 150–400)
PMV BLD AUTO: 9.6 FL (ref 8.9–12.9)
POTASSIUM SERPL-SCNC: 4.4 MMOL/L (ref 3.5–5.1)
POTASSIUM SERPL-SCNC: 4.6 MMOL/L (ref 3.5–5.2)
PROT SERPL-MCNC: 8 G/DL (ref 6.4–8.2)
PROT SERPL-MCNC: 8.3 G/DL (ref 6–8.5)
RBC # BLD AUTO: 4.39 M/UL (ref 4.1–5.7)
SODIUM SERPL-SCNC: 140 MMOL/L (ref 136–145)
SODIUM SERPL-SCNC: 142 MMOL/L (ref 134–144)
TRIGL SERPL-MCNC: 52 MG/DL (ref 0–149)
TROPONIN I SERPL HS-MCNC: 5 NG/L (ref 0–76)
TROPONIN I SERPL HS-MCNC: 5 NG/L (ref 0–76)
VLDLC SERPL CALC-MCNC: 12 MG/DL (ref 5–40)
WBC # BLD AUTO: 6.6 K/UL (ref 4.1–11.1)

## 2024-11-30 PROCEDURE — 99285 EMERGENCY DEPT VISIT HI MDM: CPT

## 2024-11-30 PROCEDURE — 71045 X-RAY EXAM CHEST 1 VIEW: CPT

## 2024-11-30 PROCEDURE — 36415 COLL VENOUS BLD VENIPUNCTURE: CPT

## 2024-11-30 PROCEDURE — 93005 ELECTROCARDIOGRAM TRACING: CPT | Performed by: EMERGENCY MEDICINE

## 2024-11-30 PROCEDURE — 80053 COMPREHEN METABOLIC PANEL: CPT

## 2024-11-30 PROCEDURE — 96374 THER/PROPH/DIAG INJ IV PUSH: CPT

## 2024-11-30 PROCEDURE — 85025 COMPLETE CBC W/AUTO DIFF WBC: CPT

## 2024-11-30 PROCEDURE — 6360000002 HC RX W HCPCS: Performed by: EMERGENCY MEDICINE

## 2024-11-30 PROCEDURE — 84484 ASSAY OF TROPONIN QUANT: CPT

## 2024-11-30 RX ORDER — KETOROLAC TROMETHAMINE 30 MG/ML
15 INJECTION, SOLUTION INTRAMUSCULAR; INTRAVENOUS
Status: COMPLETED | OUTPATIENT
Start: 2024-11-30 | End: 2024-11-30

## 2024-11-30 RX ADMIN — KETOROLAC TROMETHAMINE 15 MG: 30 INJECTION, SOLUTION INTRAMUSCULAR at 13:08

## 2024-11-30 ASSESSMENT — LIFESTYLE VARIABLES
HOW MANY STANDARD DRINKS CONTAINING ALCOHOL DO YOU HAVE ON A TYPICAL DAY: 1 OR 2
HOW OFTEN DO YOU HAVE A DRINK CONTAINING ALCOHOL: MONTHLY OR LESS

## 2024-11-30 ASSESSMENT — PAIN DESCRIPTION - LOCATION: LOCATION: CHEST

## 2024-11-30 ASSESSMENT — PAIN SCALES - GENERAL
PAINLEVEL_OUTOF10: 2
PAINLEVEL_OUTOF10: 5

## 2024-11-30 NOTE — DISCHARGE INSTRUCTIONS
Consider Tylenol and ibuprofen as needed for pain.    If you have chest discomfort especially with associated signs such as shortness of breath sweating or nausea you should return to the emergency department immediately.

## 2024-12-01 LAB
EKG ATRIAL RATE: 57 BPM
EKG DIAGNOSIS: NORMAL
EKG P AXIS: 65 DEGREES
EKG P-R INTERVAL: 184 MS
EKG Q-T INTERVAL: 382 MS
EKG QRS DURATION: 94 MS
EKG QTC CALCULATION (BAZETT): 371 MS
EKG R AXIS: 37 DEGREES
EKG T AXIS: 49 DEGREES
EKG VENTRICULAR RATE: 57 BPM

## 2024-12-01 NOTE — ED PROVIDER NOTES
\[facility]\"" EMERGENCY DEPT  EMERGENCY DEPARTMENT ENCOUNTER       Pt Name: Micheal Tejeda  MRN: [id number]  Birthdate 1955  Date of evaluation: 11/30/2024  Provider: Kostas Collins DO   PCP: Naldo Christopehr MD  Note Started: 8:46 PM EST 11/30/24     CHIEF COMPLAINT       Chief Complaint   Patient presents with    Chest Pain     Pt reports waking up this morning and noticed the left side of his chest was hurting. Pt denies sob or cardiac hx. He did take 81 mg ASA PTA        HISTORY OF PRESENT ILLNESS: 1 or more elements      History From: Patient, History limited by: none     Micheal Tejeda is a 69 y.o. male presents to the emergency department for evaluation of chest pain.       Please See MDM for Additional Details of the HPI/PMH  Nursing Notes were all reviewed and agreed with or any disagreements were addressed in the HPI.     REVIEW OF SYSTEMS        Positives and Pertinent negatives as per HPI.    PAST HISTORY     Past Medical History:  Past Medical History:   Diagnosis Date    Arthritis of knee     Elevated PSA     Pneumonia 2007    Type II or unspecified type diabetes mellitus without mention of complication, uncontrolled late 1990s    Unspecified essential hypertension     Unspecified vitamin D deficiency        Past Surgical History:  Past Surgical History:   Procedure Laterality Date    BACK SURGERY      CYST INCISION AND DRAINAGE      chest wall boil    HERNIA REPAIR      bilateral    ORTHOPEDIC SURGERY      left 5th toe surgery    ORTHOPEDIC SURGERY      right elbow surgery    ORTHOPEDIC SURGERY  03/27/2019    left carpal tunnel and ulnar nerve release    SEPTOPLASTY      TOTAL HIP ARTHROPLASTY Left 02/06/2023       Family History:  Family History   Problem Relation Age of Onset    Heart Failure Mother     Diabetes Mother     Stroke Father 70    Diabetes Maternal Grandmother        Social History:  Social History     Tobacco Use    Smoking status: Never    Smokeless tobacco: Never   Substance Use Topics

## 2024-12-07 RX ORDER — AMLODIPINE BESYLATE 10 MG/1
TABLET ORAL
Qty: 90 TABLET | Refills: 3 | Status: SHIPPED | OUTPATIENT
Start: 2024-12-07

## 2024-12-09 ENCOUNTER — OFFICE VISIT (OUTPATIENT)
Age: 69
End: 2024-12-09
Payer: MEDICARE

## 2024-12-09 VITALS
WEIGHT: 233.6 LBS | HEART RATE: 69 BPM | DIASTOLIC BLOOD PRESSURE: 58 MMHG | HEIGHT: 71 IN | BODY MASS INDEX: 32.7 KG/M2 | SYSTOLIC BLOOD PRESSURE: 130 MMHG

## 2024-12-09 DIAGNOSIS — Z79.4 TYPE 2 DIABETES MELLITUS WITH HYPERGLYCEMIA, WITH LONG-TERM CURRENT USE OF INSULIN (HCC): Primary | ICD-10-CM

## 2024-12-09 DIAGNOSIS — E78.2 MIXED HYPERLIPIDEMIA: ICD-10-CM

## 2024-12-09 DIAGNOSIS — I10 ESSENTIAL (PRIMARY) HYPERTENSION: ICD-10-CM

## 2024-12-09 DIAGNOSIS — E55.9 VITAMIN D DEFICIENCY: ICD-10-CM

## 2024-12-09 DIAGNOSIS — E11.65 TYPE 2 DIABETES MELLITUS WITH HYPERGLYCEMIA, WITH LONG-TERM CURRENT USE OF INSULIN (HCC): Primary | ICD-10-CM

## 2024-12-09 PROCEDURE — G8427 DOCREV CUR MEDS BY ELIG CLIN: HCPCS | Performed by: INTERNAL MEDICINE

## 2024-12-09 PROCEDURE — 3078F DIAST BP <80 MM HG: CPT | Performed by: INTERNAL MEDICINE

## 2024-12-09 PROCEDURE — 1160F RVW MEDS BY RX/DR IN RCRD: CPT | Performed by: INTERNAL MEDICINE

## 2024-12-09 PROCEDURE — 3052F HG A1C>EQUAL 8.0%<EQUAL 9.0%: CPT | Performed by: INTERNAL MEDICINE

## 2024-12-09 PROCEDURE — 1126F AMNT PAIN NOTED NONE PRSNT: CPT | Performed by: INTERNAL MEDICINE

## 2024-12-09 PROCEDURE — 1159F MED LIST DOCD IN RCRD: CPT | Performed by: INTERNAL MEDICINE

## 2024-12-09 PROCEDURE — 2022F DILAT RTA XM EVC RTNOPTHY: CPT | Performed by: INTERNAL MEDICINE

## 2024-12-09 PROCEDURE — 3075F SYST BP GE 130 - 139MM HG: CPT | Performed by: INTERNAL MEDICINE

## 2024-12-09 PROCEDURE — 3017F COLORECTAL CA SCREEN DOC REV: CPT | Performed by: INTERNAL MEDICINE

## 2024-12-09 PROCEDURE — 99214 OFFICE O/P EST MOD 30 MIN: CPT | Performed by: INTERNAL MEDICINE

## 2024-12-09 PROCEDURE — 1036F TOBACCO NON-USER: CPT | Performed by: INTERNAL MEDICINE

## 2024-12-09 PROCEDURE — 95251 CONT GLUC MNTR ANALYSIS I&R: CPT | Performed by: INTERNAL MEDICINE

## 2024-12-09 PROCEDURE — G8417 CALC BMI ABV UP PARAM F/U: HCPCS | Performed by: INTERNAL MEDICINE

## 2024-12-09 PROCEDURE — 1123F ACP DISCUSS/DSCN MKR DOCD: CPT | Performed by: INTERNAL MEDICINE

## 2024-12-09 PROCEDURE — G2211 COMPLEX E/M VISIT ADD ON: HCPCS | Performed by: INTERNAL MEDICINE

## 2024-12-09 PROCEDURE — G8484 FLU IMMUNIZE NO ADMIN: HCPCS | Performed by: INTERNAL MEDICINE

## 2024-12-09 NOTE — PATIENT INSTRUCTIONS
1) Your Hemoglobin A1c (3 month test of blood sugar) is up to 8.4% but you do have plenty of good readings and your highest spikes seem to be in the evening after dinner so be careful with snacking at night.  Try not to snack on anything with sugar after dinner as this will cause your blood sugar to be higher the next morning.  You can try sugar free jello or pudding or raw veggies or nuts or cheese (without crackers) as these won't cause your sugar to spike overnight    2) BUN and creatinine are markers of kidney function.  Your values are normal.    3) ALT and AST are markers of liver function.  Your values are normal.    4) Your blood pressure and cholesterol and urine are all normal.

## 2024-12-09 NOTE — PROGRESS NOTES
g/dL 8.3    Albumin      3.8 - 4.8 g/dL 4.5    Globulin, Total      1.5 - 4.5 g/dL 3.8    Total Bilirubin      0.0 - 1.2 mg/dL 0.3    Alkaline Phosphatase      44 - 121 IU/L 106    AST      0 - 40 IU/L 17    ALT      0 - 44 IU/L 10    Cholesterol, Total      100 - 199 mg/dL 126    Triglycerides      0 - 149 mg/dL 52    HDL Cholesterol      >39 mg/dL 44    VLDL      5 - 40 mg/dL 12    LDL Cholesterol      0 - 99 mg/dL 70    Creatinine, Ur      Not Estab. mg/dL 57.3    Microalb, Ur      Not Estab. ug/mL 8.7    Microalb/Creat Ratio POC      0 - 29 mg/g creat 15    Hemoglobin A1C      4.8 - 5.6 % 8.4 (H)        Assessment/Plan:     1. Type II or unspecified type diabetes mellitus without mention of complication, uncontrolled (HCC): his most recent Hgb A1c was 8.4% in 11/24 up from 7.8% in 5/24 down from 9.3% in 11/23 up from 9.1% in 5/23 up from 7.8% in 1/23 down from 8.1% in 12/22 down from 8.9% in 6/22 down from 12.3% in 11/21 up from 11.3% in 10/21 down from 11.5% in 5/21 up from 11% in 11/20 up from 10.2% in 6/20 down from 11% in 2/20 down from 12% in 6/19 up from 9.4% in 10/18 up from 9.2% in 2/18 down from 9.9% in 9/17 up from 9.4% in 3/17 down from 10.6% in 10/16 up from 9.9% in 4/16 down from 11.4% in 1/16 up from 10.2% in 9/15 up from 10% in 1/15.  A1c is above goal < 7-7.5% due to diet and no trends to warrant changes  - take 70/30 insulin 50 units before breakfast, 15 units before lunch and 40 units before dinner + 5 units for every 50 mg/dl above 150 mg/dl  - cont metformin  mg tabs 2 tabs bid  - check bs 3 times daily   - foot exam done 12/23  - optho UTD 11/19  - microalbumin nl 11/24  - check A1c and bmp prior to next visit  - check Hgb A1c, cmp, and microalbumin in 11/25         2. Unspecified essential hypertension: his BP was at goal < 140/90   - cont diltiazem 360 mg daily  - cont losartan/hct 100/12.5 mg daily  - cont lasix 20 mg daily  - monitor home blood pressure readings         3.

## 2024-12-18 RX ORDER — LOSARTAN POTASSIUM AND HYDROCHLOROTHIAZIDE 12.5; 1 MG/1; MG/1
TABLET ORAL
Qty: 90 TABLET | Refills: 0 | Status: SHIPPED | OUTPATIENT
Start: 2024-12-18

## 2025-01-02 RX ORDER — ATORVASTATIN CALCIUM 10 MG/1
TABLET, FILM COATED ORAL
Qty: 90 TABLET | Refills: 0 | Status: SHIPPED | OUTPATIENT
Start: 2025-01-02

## 2025-01-07 RX ORDER — CARVEDILOL 6.25 MG/1
TABLET ORAL
Qty: 180 TABLET | Refills: 0 | Status: SHIPPED | OUTPATIENT
Start: 2025-01-07

## 2025-01-23 DIAGNOSIS — R09.81 NASAL CONGESTION: ICD-10-CM

## 2025-01-24 RX ORDER — FLUTICASONE PROPIONATE 50 MCG
1 SPRAY, SUSPENSION (ML) NASAL 2 TIMES DAILY
Qty: 32 G | Refills: 3 | Status: SHIPPED | OUTPATIENT
Start: 2025-01-24

## 2025-01-28 ENCOUNTER — OFFICE VISIT (OUTPATIENT)
Age: 70
End: 2025-01-28
Payer: MEDICARE

## 2025-01-28 VITALS
TEMPERATURE: 97.2 F | RESPIRATION RATE: 18 BRPM | OXYGEN SATURATION: 95 % | DIASTOLIC BLOOD PRESSURE: 68 MMHG | BODY MASS INDEX: 34.01 KG/M2 | WEIGHT: 237.6 LBS | HEART RATE: 67 BPM | HEIGHT: 70 IN | SYSTOLIC BLOOD PRESSURE: 139 MMHG

## 2025-01-28 DIAGNOSIS — E11.65 TYPE 2 DIABETES MELLITUS WITH HYPERGLYCEMIA, WITH LONG-TERM CURRENT USE OF INSULIN (HCC): ICD-10-CM

## 2025-01-28 DIAGNOSIS — G56.01 CARPAL TUNNEL SYNDROME ON RIGHT: ICD-10-CM

## 2025-01-28 DIAGNOSIS — R06.09 DOE (DYSPNEA ON EXERTION): ICD-10-CM

## 2025-01-28 DIAGNOSIS — R68.89 EXERCISE INTOLERANCE: ICD-10-CM

## 2025-01-28 DIAGNOSIS — Z79.4 TYPE 2 DIABETES MELLITUS WITH HYPERGLYCEMIA, WITH LONG-TERM CURRENT USE OF INSULIN (HCC): ICD-10-CM

## 2025-01-28 DIAGNOSIS — I10 ESSENTIAL (PRIMARY) HYPERTENSION: ICD-10-CM

## 2025-01-28 DIAGNOSIS — Z12.11 COLON CANCER SCREENING: ICD-10-CM

## 2025-01-28 DIAGNOSIS — Z00.00 ROUTINE GENERAL MEDICAL EXAMINATION AT A HEALTH CARE FACILITY: Primary | ICD-10-CM

## 2025-01-28 PROCEDURE — 3017F COLORECTAL CA SCREEN DOC REV: CPT | Performed by: FAMILY MEDICINE

## 2025-01-28 PROCEDURE — 1123F ACP DISCUSS/DSCN MKR DOCD: CPT | Performed by: FAMILY MEDICINE

## 2025-01-28 PROCEDURE — 1159F MED LIST DOCD IN RCRD: CPT | Performed by: FAMILY MEDICINE

## 2025-01-28 PROCEDURE — 1126F AMNT PAIN NOTED NONE PRSNT: CPT | Performed by: FAMILY MEDICINE

## 2025-01-28 PROCEDURE — 3075F SYST BP GE 130 - 139MM HG: CPT | Performed by: FAMILY MEDICINE

## 2025-01-28 PROCEDURE — 3046F HEMOGLOBIN A1C LEVEL >9.0%: CPT | Performed by: FAMILY MEDICINE

## 2025-01-28 PROCEDURE — 3078F DIAST BP <80 MM HG: CPT | Performed by: FAMILY MEDICINE

## 2025-01-28 PROCEDURE — G0439 PPPS, SUBSEQ VISIT: HCPCS | Performed by: FAMILY MEDICINE

## 2025-01-28 SDOH — ECONOMIC STABILITY: FOOD INSECURITY: WITHIN THE PAST 12 MONTHS, YOU WORRIED THAT YOUR FOOD WOULD RUN OUT BEFORE YOU GOT MONEY TO BUY MORE.: NEVER TRUE

## 2025-01-28 SDOH — ECONOMIC STABILITY: FOOD INSECURITY: WITHIN THE PAST 12 MONTHS, THE FOOD YOU BOUGHT JUST DIDN'T LAST AND YOU DIDN'T HAVE MONEY TO GET MORE.: NEVER TRUE

## 2025-01-28 ASSESSMENT — LIFESTYLE VARIABLES: HOW MANY STANDARD DRINKS CONTAINING ALCOHOL DO YOU HAVE ON A TYPICAL DAY: 1 OR 2

## 2025-01-28 ASSESSMENT — PATIENT HEALTH QUESTIONNAIRE - PHQ9
1. LITTLE INTEREST OR PLEASURE IN DOING THINGS: NOT AT ALL
SUM OF ALL RESPONSES TO PHQ QUESTIONS 1-9: 0
2. FEELING DOWN, DEPRESSED OR HOPELESS: NOT AT ALL
SUM OF ALL RESPONSES TO PHQ9 QUESTIONS 1 & 2: 0
SUM OF ALL RESPONSES TO PHQ QUESTIONS 1-9: 0
2. FEELING DOWN, DEPRESSED OR HOPELESS: NOT AT ALL
SUM OF ALL RESPONSES TO PHQ QUESTIONS 1-9: 0
SUM OF ALL RESPONSES TO PHQ9 QUESTIONS 1 & 2: 0
SUM OF ALL RESPONSES TO PHQ QUESTIONS 1-9: 0
1. LITTLE INTEREST OR PLEASURE IN DOING THINGS: NOT AT ALL
SUM OF ALL RESPONSES TO PHQ QUESTIONS 1-9: 0
SUM OF ALL RESPONSES TO PHQ QUESTIONS 1-9: 0

## 2025-01-28 NOTE — PROGRESS NOTES
Medicare Annual Wellness Visit     I have reviewed the patient's medical history in detail and updated the computerized patient record.     History   Micheal Tejeda is a 69 y.o. male who Presents to follow-up on chronic medical issues.    Last seen 6/2023.  Doing well at time.  Following with endocrinology.  Blood sugar went up a bit when last seen 11/2024.  Reports he has been working on his diet since.    For 6 months has had numbness in the right index and middle and do certain degree his thumb.  Recognizes this as symptoms similar to carpal tunnel that he had on the left.  He is in surgery on the left before.  Has noticed some thenar atrophy on the right in the last couple months.  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  He is wearing a wrist brace in the office today.  Mostly wears this at night.  Is a little loose.    Complain of JAMESON at her last visit.  Got a stress test which was normal.  Has had a few episodes in the last year that have been concerning to him.  Had some chest pain that brought him to the emergency room a few months ago and had an episode of shortness of breath walking across a parking lot.  Felt like this lasted about 10 minutes and then got better.  Denies chest pain on that occasion no palpitations etc.    Past Medical History:   Diagnosis Date    Arthritis of knee     Elevated PSA     Pneumonia 2007    Type II or unspecified type diabetes mellitus without mention of complication, uncontrolled late 1990s    Unspecified essential hypertension     Unspecified vitamin D deficiency       Past Surgical History:   Procedure Laterality Date    BACK SURGERY      CYST INCISION AND DRAINAGE      chest wall boil    HERNIA REPAIR      bilateral    ORTHOPEDIC SURGERY      left 5th toe surgery    ORTHOPEDIC SURGERY      right elbow surgery    ORTHOPEDIC SURGERY  03/27/2019

## 2025-01-28 NOTE — PROGRESS NOTES
The patient identity was confirmed with  and First/Last Name. Medications and Allergies reviewed with patient, as well as any new diagnosis/procedures. Depression Screening and SDOH Screening done today.    Chief Complaint   Patient presents with    Medicare AWV   -Covid shot- 24  -Flu Shot- 24     Vitals:    25 1347   BP: 139/68   Pulse: 67   Resp: 18   Temp: 97.2 °F (36.2 °C)   SpO2: 95%       Health Maintenance Due   Topic Date Due    Shingles vaccine (1 of 2) Never done    Respiratory Syncytial Virus (RSV) Pregnant or age 60 yrs+ (1 - Risk 60-74 years 1-dose series) Never done    DTaP/Tdap/Td vaccine (2 - Td or Tdap) 2020    Prostate Specific Antigen (PSA) Screening or Monitoring  2023    Annual Wellness Visit (Medicare)  2023    Colorectal Cancer Screen  2024    Flu vaccine (1) 2024    COVID-19 Vaccine ( season) 2024    Diabetic retinal exam  2024    Depression Screen  2024    Diabetic foot exam  2024          \"Have you been to the ER, urgent care clinic since your last visit?  Hospitalized since your last visit?\"    NO    “Have you seen or consulted any other health care providers outside our system since your last visit?”    NO      “Have you had a colorectal cancer screening such as a colonoscopy/FIT/Cologuard?    NO    Date of last Colonoscopy: 2014  No cologuard on file  No FIT/FOBT on file   No flexible sigmoidoscopy on file     “Have you had a diabetic eye exam?”    YES - Where: 24 Nurse/CMA to request most recent records if not in the chart     Date of last diabetic eye exam: 2023

## 2025-02-21 ENCOUNTER — TELEPHONE (OUTPATIENT)
Age: 70
End: 2025-02-21

## 2025-02-21 NOTE — TELEPHONE ENCOUNTER
Pt LVM stating he is scheduled for a colonoscopy on 2/26/25 and would like to know medication regimen prior to procedure.

## 2025-02-21 NOTE — TELEPHONE ENCOUNTER
On 2/25 when he will just be consuming clear liquids, he can take his normal dose of metformin 4 tabs but just take 35 units in the morning and 30 units at dinner.      On 2/26 when he will not be allowed to eat, he should just take 25 units of insulin in the morning but no metformin.  After the procedure is over he can go back to his normal doses of insulin and metformin.

## 2025-02-25 NOTE — PROGRESS NOTES
Patient has spoken with DM physician to get clear instructions on what to do with his insulin day of procedure. He wears a glucose monitor and will notify his physician is BS are lower than normal. He is aware not to take his Metformin day of procedure and will take his heart and fluids pills day of procedure.

## 2025-02-26 ENCOUNTER — HOSPITAL ENCOUNTER (OUTPATIENT)
Facility: HOSPITAL | Age: 70
Setting detail: OUTPATIENT SURGERY
Discharge: HOME | End: 2025-02-26
Attending: SPECIALIST | Admitting: SPECIALIST
Payer: MEDICARE

## 2025-02-26 ENCOUNTER — ANESTHESIA EVENT (OUTPATIENT)
Facility: HOSPITAL | Age: 70
End: 2025-02-26
Payer: MEDICARE

## 2025-02-26 ENCOUNTER — ANESTHESIA (OUTPATIENT)
Facility: HOSPITAL | Age: 70
End: 2025-02-26
Payer: MEDICARE

## 2025-02-26 VITALS
TEMPERATURE: 97.7 F | DIASTOLIC BLOOD PRESSURE: 76 MMHG | WEIGHT: 229 LBS | HEART RATE: 78 BPM | SYSTOLIC BLOOD PRESSURE: 166 MMHG | BODY MASS INDEX: 32.06 KG/M2 | RESPIRATION RATE: 10 BRPM | OXYGEN SATURATION: 97 % | HEIGHT: 71 IN

## 2025-02-26 LAB
GLUCOSE BLD STRIP.AUTO-MCNC: 112 MG/DL (ref 65–117)
SERVICE CMNT-IMP: NORMAL

## 2025-02-26 PROCEDURE — 2709999900 HC NON-CHARGEABLE SUPPLY: Performed by: SPECIALIST

## 2025-02-26 PROCEDURE — 7100000011 HC PHASE II RECOVERY - ADDTL 15 MIN: Performed by: SPECIALIST

## 2025-02-26 PROCEDURE — 88305 TISSUE EXAM BY PATHOLOGIST: CPT

## 2025-02-26 PROCEDURE — 7100000010 HC PHASE II RECOVERY - FIRST 15 MIN: Performed by: SPECIALIST

## 2025-02-26 PROCEDURE — 82962 GLUCOSE BLOOD TEST: CPT

## 2025-02-26 PROCEDURE — 3600007512: Performed by: SPECIALIST

## 2025-02-26 PROCEDURE — 6360000002 HC RX W HCPCS

## 2025-02-26 PROCEDURE — 2580000003 HC RX 258: Performed by: SPECIALIST

## 2025-02-26 PROCEDURE — 2580000003 HC RX 258

## 2025-02-26 PROCEDURE — 3700000000 HC ANESTHESIA ATTENDED CARE: Performed by: SPECIALIST

## 2025-02-26 PROCEDURE — 3700000001 HC ADD 15 MINUTES (ANESTHESIA): Performed by: SPECIALIST

## 2025-02-26 PROCEDURE — 3600007502: Performed by: SPECIALIST

## 2025-02-26 RX ORDER — SODIUM CHLORIDE 0.9 % (FLUSH) 0.9 %
5-40 SYRINGE (ML) INJECTION PRN
Status: DISCONTINUED | OUTPATIENT
Start: 2025-02-26 | End: 2025-02-26 | Stop reason: HOSPADM

## 2025-02-26 RX ORDER — SODIUM CHLORIDE 0.9 % (FLUSH) 0.9 %
5-40 SYRINGE (ML) INJECTION EVERY 12 HOURS SCHEDULED
Status: DISCONTINUED | OUTPATIENT
Start: 2025-02-26 | End: 2025-02-26 | Stop reason: HOSPADM

## 2025-02-26 RX ORDER — HYDRALAZINE HYDROCHLORIDE 20 MG/ML
INJECTION INTRAMUSCULAR; INTRAVENOUS
Status: DISCONTINUED | OUTPATIENT
Start: 2025-02-26 | End: 2025-02-26 | Stop reason: SDUPTHER

## 2025-02-26 RX ORDER — LIDOCAINE HYDROCHLORIDE 20 MG/ML
INJECTION, SOLUTION EPIDURAL; INFILTRATION; INTRACAUDAL; PERINEURAL
Status: DISCONTINUED | OUTPATIENT
Start: 2025-02-26 | End: 2025-02-26 | Stop reason: SDUPTHER

## 2025-02-26 RX ORDER — SODIUM CHLORIDE 9 MG/ML
INJECTION, SOLUTION INTRAVENOUS PRN
Status: DISCONTINUED | OUTPATIENT
Start: 2025-02-26 | End: 2025-02-26 | Stop reason: HOSPADM

## 2025-02-26 RX ORDER — SODIUM CHLORIDE 9 MG/ML
INJECTION, SOLUTION INTRAVENOUS
Status: DISCONTINUED | OUTPATIENT
Start: 2025-02-26 | End: 2025-02-26 | Stop reason: SDUPTHER

## 2025-02-26 RX ADMIN — LIDOCAINE HYDROCHLORIDE 100 MG: 20 INJECTION, SOLUTION EPIDURAL; INFILTRATION; INTRACAUDAL; PERINEURAL at 14:42

## 2025-02-26 RX ADMIN — PROPOFOL 30 MG: 10 INJECTION, EMULSION INTRAVENOUS at 14:45

## 2025-02-26 RX ADMIN — HYDRALAZINE HYDROCHLORIDE 4 MG: 20 INJECTION INTRAMUSCULAR; INTRAVENOUS at 14:53

## 2025-02-26 RX ADMIN — PROPOFOL 30 MG: 10 INJECTION, EMULSION INTRAVENOUS at 14:59

## 2025-02-26 RX ADMIN — PROPOFOL 20 MG: 10 INJECTION, EMULSION INTRAVENOUS at 14:44

## 2025-02-26 RX ADMIN — PROPOFOL 40 MG: 10 INJECTION, EMULSION INTRAVENOUS at 14:55

## 2025-02-26 RX ADMIN — SODIUM CHLORIDE: 0.9 INJECTION, SOLUTION INTRAVENOUS at 14:33

## 2025-02-26 RX ADMIN — PROPOFOL 70 MG: 10 INJECTION, EMULSION INTRAVENOUS at 14:42

## 2025-02-26 RX ADMIN — PROPOFOL 20 MG: 10 INJECTION, EMULSION INTRAVENOUS at 14:51

## 2025-02-26 RX ADMIN — SODIUM CHLORIDE: 9 INJECTION, SOLUTION INTRAVENOUS at 14:41

## 2025-02-26 RX ADMIN — HYDRALAZINE HYDROCHLORIDE 4 MG: 20 INJECTION INTRAMUSCULAR; INTRAVENOUS at 15:04

## 2025-02-26 RX ADMIN — PROPOFOL 30 MG: 10 INJECTION, EMULSION INTRAVENOUS at 14:48

## 2025-02-26 RX ADMIN — PROPOFOL 30 MG: 10 INJECTION, EMULSION INTRAVENOUS at 14:43

## 2025-02-26 ASSESSMENT — PAIN - FUNCTIONAL ASSESSMENT: PAIN_FUNCTIONAL_ASSESSMENT: NONE - DENIES PAIN

## 2025-02-26 NOTE — ANESTHESIA PRE PROCEDURE
Component Value Date/Time    HEPCAB NONREACTIVE 10/04/2021 12:10 PM       COVID-19 Screening (If Applicable): No results found for: \"COVID19\"        Anesthesia Evaluation  Patient summary reviewed and Nursing notes reviewed  Airway: Mallampati: II     Neck ROM: full  Mouth opening: > = 3 FB   Dental:    (+) partials      Pulmonary:Negative Pulmonary ROS and normal exam                               Cardiovascular:  Exercise tolerance: good (>4 METS)  (+) hypertension:                  Neuro/Psych:   Negative Neuro/Psych ROS              GI/Hepatic/Renal:            ROS comment: Obesity  .   Endo/Other:    (+) DiabetesType II DM, poorly controlled, : arthritis:..                  ROS comment: Vitamin D deficiency   S/P hip replacement   Elevated PSA  Unspecified vitamin D deficiency   Abdominal:             Vascular: negative vascular ROS.         Other Findings:             Anesthesia Plan      TIVA     ASA 2       Induction: intravenous.  continuous noninvasive hemodynamic monitor    Anesthetic plan and risks discussed with patient.      Plan discussed with CRNA.                    Jarrod Price MD   2/26/2025

## 2025-02-26 NOTE — DISCHARGE INSTRUCTIONS
Micheal CARTY [facility]  [id number]  1955    COLON DISCHARGE INSTRUCTIONS    CALL M.D.  ANY SIGN OF:  Increasing pain, nausea, vomiting  Abdominal distension (swelling)  New increased bleeding (oral or rectal)  Fever (chills)  Pain in chest area  Bloody discharge from nose or mouth  Shortness of breath    For 24 hours after general anesthesia or intravenous analgesia / sedation  you may experience:  Drowsiness, dizziness, sleepiness, or confusion  Difficulty remembering or delayed reaction times  Difficulty with your balance, especially while walking, move slowly and carefully, do not make sudden position changes  Difficulty focusing or blurred vision    Discomfort:  Redness at IV site- apply warm compress to area; if redness or soreness persist- contact your physician  There may be a slight amount of blood passed from the rectum  Gaseous discomfort- walking, belching will help relieve any discomfort    DIET:   Regular diet.   - however -  remember your colon is empty and a heavy meal will produce gas.   Avoid these foods:  vegetables, fried / greasy foods, carbonated drinks for today.    MEDICATIONS:   Tylenol as needed for pain.    ACTIVITY:  You may resume your normal daily activities it is recommended that you spend the remainder of the day resting -  avoid any strenuous activity.  You may not operate a vehicle for 12 hours  You may not engage in an occupation involving machinery or appliances for rest of today  You may not drink alcoholic beverages for at least 12 hours  Avoid making any critical decisions for at least 24 hour    Follow-up Instructions:   Call Dr. Rodriguez for results of procedure / biopsy in 4-5 days at telephone #  [phone]              Repeat Colonoscopy in 5 years    Findings:  (3) small colon polyps: removed    Patient Education on Sedation / Analgesia Administered for Procedure      For 24 hours after general anesthesia or intravenous analgesia / sedation:  Have someone responsible help

## 2025-02-26 NOTE — ANESTHESIA POSTPROCEDURE EVALUATION
Department of Anesthesiology  Postprocedure Note    Patient: Micheal Tejeda  MRN: [id number]  YOB: 1955  Date of evaluation: 2/26/2025    Procedure Summary       Date: 02/26/25 Room / Location: [facility] ENDO 01 / [facility] ENDOSCOPY    Anesthesia Start: 1440 Anesthesia Stop: 1505    Procedure: COLONOSCOPY DIAGNOSTIC Diagnosis:       Screen for colon cancer      (Screen for colon cancer [Z12.11])    Surgeons: Alfonzo Rodriguez MD Responsible Provider: Jarrod Price MD    Anesthesia Type: MAC ASA Status: 2            Anesthesia Type: MAC    Sanford Phase I: Sanford Score: 10    Sanford Phase II:      Anesthesia Post Evaluation    Patient location during evaluation: PACU  Patient participation: complete - patient participated  Level of consciousness: awake  Airway patency: patent  Nausea & Vomiting: no vomiting  Cardiovascular status: hemodynamically stable  Respiratory status: acceptable  Hydration status: euvolemic    No notable events documented.

## 2025-02-26 NOTE — OP NOTE
Colonoscopy Procedure Note    Indications:   Screening colonoscopy    Referring Physician: Naldo Christopher MD  Anesthesia/Sedation: MAC anesthesia Propofol  Endoscopist:  Dr. Alfonzo Rodriguez    Procedure in Detail:  Informed consent was obtained for the procedure, including sedation.  Risks of perforation, hemorrhage, adverse drug reaction, and aspiration were discussed. The patient was placed in the left lateral decubitus position.  Based on the pre-procedure assessment, including review of the patient's medical history, medications, allergies, and review of systems, he had been deemed to be an appropriate candidate for moderate sedation; he was therefore sedated with the medications listed above.   The patient was monitored continuously with ECG tracing, pulse oximetry, blood pressure monitoring, and direct observations.      A rectal examination was performed. The [id number] was inserted into the rectum and advanced under direct vision to the cecum, which was identified by the ileocecal valve and appendiceal orifice.  The quality of the colonic preparation was adequate.  A careful inspection was made as the colonoscope was withdrawn, including a retroflexed view of the rectum; findings and interventions are described below.  Appropriate photodocumentation was obtained.    Findings:    Scope advanced to the cecum.    (2) sessile 5 mm polyps in the ascending colon s/p cold snare removal.   (1) small 3 mm sessile distal sigmoid polyp s/p cold forcep removal.   Mild sigmoid diverticulosis.   Normal mucosa throughout.   Grade 1 internal hemorrhoids.    Therapies:  see above    Specimen: Specimens were collected as described above and sent to pathology.     Complications: None were encountered during the procedure.     EBL: < 10 ml.    Recommendations:     - Repeat colonoscopy in 5 years.    Signed By: Alfonzo Rodriguez MD                        February 26, 2025

## 2025-02-26 NOTE — PERIOP NOTE
See anesthesia note and MAR for medications given during procedure.   Endoscope was pre-cleaned at the bedside immediately following procedure by MELITON Benitez

## 2025-02-26 NOTE — PERIOP NOTE
ARRIVAL INFORMATION:  Verified patient name and date of birth, scheduled procedure, and informed consent.     : Michael (brother) contact number: [phone]  Physician and staff can share information with the .     Receive texts: yes    Belongings with patient include:  Clothing,Glasses    GI FOCUSED ASSESSMENT:  Neuro: Awake, alert, oriented x4  Respiratory: even and unlabored   GI: soft and non-distended  EKG Rhythm: normal sinus rhythm and sinus tachycardia    Education:Reviewed general discharge instructions and  information.

## 2025-03-14 RX ORDER — LOSARTAN POTASSIUM AND HYDROCHLOROTHIAZIDE 12.5; 1 MG/1; MG/1
1 TABLET ORAL DAILY
Qty: 90 TABLET | Refills: 3 | Status: SHIPPED | OUTPATIENT
Start: 2025-03-14

## 2025-03-17 RX ORDER — BACLOFEN 10 MG/1
10 TABLET ORAL PRN
COMMUNITY
Start: 2025-03-07

## 2025-03-17 RX ORDER — MELOXICAM 15 MG/1
1 TABLET ORAL
COMMUNITY

## 2025-03-17 NOTE — PERIOP NOTE
[facility]  Ambulatory Surgery Unit  [address] [place] [address]  [address]  Pre-operative Instructions    Surgery/Procedure Date  Monday 3/24            Tentative Arrival Time TBD      1. On the day of your surgery/procedure, please report to the Ambulatory Surgery Unit Registration Desk and sign in at your designated time. The Ambulatory Surgery Unit is located in [facility] on the [facility] side of the [facility] across from the [facility]. Please have all of your health insurance cards, co-payment, and a photo ID.    **TWO adults may accompany you the day of the procedure.  We have limited seating available.      2. You cannot be dropped off for surgery.  Please make arrangements for a responsible adult friend or family member to remain on the hospital campus during your procedure, and drive you home, as you should not drive for 24 hours following anesthesia. Make arrangements for a responsible adult to stay with you for at least the first 24 hours after your surgery.    3. Do not have anything to eat or drink (including water, gum, mints, coffee, juice) after 11:59 PM on Boris 3/23 . This may not apply to medications prescribed by your physician.  (Please note below the special instructions with medications to take the morning of surgery, if applicable.)    You can brush your teeth am of surgery    4. We recommend you do not drink any alcoholic beverages for 24 hours before and after your surgery.    5. Contact your surgeon’s office for instructions on the following medications: non-steroidal anti-inflammatory drugs (i.e. Advil, Aleve), vitamins, and supplements. (Some surgeon’s will want you to stop these medications prior to surgery and others may allow you to take them)   **If you are currently taking Plavix, Coumadin, Aspirin and/or other blood-thinning agents, contact your surgeon for instructions.** Your surgeon will partner with the physician prescribing

## 2025-03-21 ENCOUNTER — ANESTHESIA EVENT (OUTPATIENT)
Facility: HOSPITAL | Age: 70
End: 2025-03-21
Payer: MEDICARE

## 2025-03-21 NOTE — H&P
Subjective:   Patient ID: Micheal Tejeda is a 69 y.o. male.     Hand-dominance: Left        Chief Complaint: Pain and Numbness of the Right Hand      History of present illness:      This is a 69-year-old left-hand-dominant male who presents today with right hand numbness as well as bilateral long finger pain.  Numbness involves the index long and ring finger on the right.  Does get nocturnal symptoms.  Symptoms are constant.  Does wear a brace.  Does have a history of diabetes.  Last hemoglobin A1c was 8.4.  Denies any neck pain.  Pain is 4/10 at rest today.     Pain rating = 4  out of 10     ROS, PMHx, and SocHx reviewed with no changes.     Review of Systems   2/18/2025     Constitutional: Unexplained: Negative  Genitourinary: Frequent Urination: Positive  HEENT: Vision Loss: Negative  Neurological: Memory Loss: Negative  Integumentary: Rash: Negative  Cardiovascular: Palpatations: Negative  Hematologic: Bruises/Bleeds Easily: Negative  Gastrointestinal: Constipation: Negative  Immunological: Seasonal Allergies: Positive  Musculoskeletal: Joint Pain: Positive        Current Outpatient Medications:     alfuzosin (UROXATRAL) 10 MG 24 hr tablet, Take 10 mg by mouth daily, Disp: , Rfl:     amLODIPine (NORVASC) 10 MG tablet, Take 10 mg by mouth once daily, Disp: , Rfl:     atorvastatin (LIPITOR) 10 MG tablet, Take 10 mg by mouth daily, Disp: , Rfl:     carvedilol (COREG) 6.25 MG tablet, Take 6.25 mg by mouth 2 (two) times a day with meals, Disp: , Rfl:     Cholecalciferol (VITAMIN D3) 125 MCG (5000 UT) tablet, Take 1 tablet by mouth daily, Disp: , Rfl:     Cyanocobalamin (VITAMIN B 12 PO), Take by mouth, Disp: , Rfl:     diltiazem (TIAZAC) 360 MG 24 hr capsule, , Disp: , Rfl:     EQ Aspirin Adult Low Dose 81 MG EC tablet, Take 81 mg by mouth 2 (two) times a day, Disp: , Rfl:     fluticasone (FLONASE) 50 MCG/ACT nasal spray, 1 spray 2 times daily, Disp: , Rfl:     furosemide (LASIX) 40 MG tablet, Take 40 mg by mouth

## 2025-03-24 ENCOUNTER — ANESTHESIA (OUTPATIENT)
Facility: HOSPITAL | Age: 70
End: 2025-03-24
Payer: MEDICARE

## 2025-03-24 ENCOUNTER — HOSPITAL ENCOUNTER (OUTPATIENT)
Facility: HOSPITAL | Age: 70
Setting detail: OUTPATIENT SURGERY
Discharge: HOME | End: 2025-03-24
Attending: ORTHOPAEDIC SURGERY | Admitting: ORTHOPAEDIC SURGERY
Payer: MEDICARE

## 2025-03-24 VITALS
RESPIRATION RATE: 13 BRPM | TEMPERATURE: 97.3 F | SYSTOLIC BLOOD PRESSURE: 175 MMHG | HEIGHT: 71 IN | DIASTOLIC BLOOD PRESSURE: 81 MMHG | BODY MASS INDEX: 31.5 KG/M2 | OXYGEN SATURATION: 96 % | HEART RATE: 67 BPM | WEIGHT: 225 LBS

## 2025-03-24 DIAGNOSIS — G89.18 POST-OP PAIN: Primary | ICD-10-CM

## 2025-03-24 LAB
GLUCOSE BLD STRIP.AUTO-MCNC: 162 MG/DL (ref 65–117)
SERVICE CMNT-IMP: ABNORMAL

## 2025-03-24 PROCEDURE — 3700000000 HC ANESTHESIA ATTENDED CARE: Performed by: ORTHOPAEDIC SURGERY

## 2025-03-24 PROCEDURE — 2709999900 HC NON-CHARGEABLE SUPPLY: Performed by: ORTHOPAEDIC SURGERY

## 2025-03-24 PROCEDURE — 3600000012 HC SURGERY LEVEL 2 ADDTL 15MIN: Performed by: ORTHOPAEDIC SURGERY

## 2025-03-24 PROCEDURE — 3600000002 HC SURGERY LEVEL 2 BASE: Performed by: ORTHOPAEDIC SURGERY

## 2025-03-24 PROCEDURE — 6360000002 HC RX W HCPCS: Performed by: REGISTERED NURSE

## 2025-03-24 PROCEDURE — 82962 GLUCOSE BLOOD TEST: CPT

## 2025-03-24 PROCEDURE — 2580000003 HC RX 258: Performed by: PHYSICIAN ASSISTANT

## 2025-03-24 PROCEDURE — 6360000002 HC RX W HCPCS: Performed by: ORTHOPAEDIC SURGERY

## 2025-03-24 PROCEDURE — 7100000011 HC PHASE II RECOVERY - ADDTL 15 MIN: Performed by: ORTHOPAEDIC SURGERY

## 2025-03-24 PROCEDURE — 7100000010 HC PHASE II RECOVERY - FIRST 15 MIN: Performed by: ORTHOPAEDIC SURGERY

## 2025-03-24 PROCEDURE — 2500000003 HC RX 250 WO HCPCS: Performed by: ORTHOPAEDIC SURGERY

## 2025-03-24 PROCEDURE — 3700000001 HC ADD 15 MINUTES (ANESTHESIA): Performed by: ORTHOPAEDIC SURGERY

## 2025-03-24 PROCEDURE — 7100000000 HC PACU RECOVERY - FIRST 15 MIN: Performed by: ORTHOPAEDIC SURGERY

## 2025-03-24 RX ORDER — FENTANYL CITRATE 50 UG/ML
25 INJECTION, SOLUTION INTRAMUSCULAR; INTRAVENOUS EVERY 5 MIN PRN
Status: DISCONTINUED | OUTPATIENT
Start: 2025-03-24 | End: 2025-03-24 | Stop reason: HOSPADM

## 2025-03-24 RX ORDER — MIDAZOLAM HYDROCHLORIDE 5 MG/5ML
2 INJECTION, SOLUTION INTRAMUSCULAR; INTRAVENOUS
Status: DISCONTINUED | OUTPATIENT
Start: 2025-03-24 | End: 2025-03-24 | Stop reason: HOSPADM

## 2025-03-24 RX ORDER — SODIUM CHLORIDE 0.9 % (FLUSH) 0.9 %
5-40 SYRINGE (ML) INJECTION EVERY 12 HOURS SCHEDULED
Status: DISCONTINUED | OUTPATIENT
Start: 2025-03-24 | End: 2025-03-24 | Stop reason: HOSPADM

## 2025-03-24 RX ORDER — ACETAMINOPHEN 500 MG
1000 TABLET ORAL
Status: DISCONTINUED | OUTPATIENT
Start: 2025-03-24 | End: 2025-03-24 | Stop reason: HOSPADM

## 2025-03-24 RX ORDER — SODIUM CHLORIDE 0.9 % (FLUSH) 0.9 %
5-40 SYRINGE (ML) INJECTION PRN
Status: DISCONTINUED | OUTPATIENT
Start: 2025-03-24 | End: 2025-03-24 | Stop reason: HOSPADM

## 2025-03-24 RX ORDER — ONDANSETRON 2 MG/ML
4 INJECTION INTRAMUSCULAR; INTRAVENOUS
Status: DISCONTINUED | OUTPATIENT
Start: 2025-03-24 | End: 2025-03-24 | Stop reason: HOSPADM

## 2025-03-24 RX ORDER — FENTANYL CITRATE 50 UG/ML
INJECTION, SOLUTION INTRAMUSCULAR; INTRAVENOUS
Status: DISCONTINUED | OUTPATIENT
Start: 2025-03-24 | End: 2025-03-24 | Stop reason: SDUPTHER

## 2025-03-24 RX ORDER — WATER 10 ML/10ML
INJECTION INTRAMUSCULAR; INTRAVENOUS; SUBCUTANEOUS
Status: DISCONTINUED
Start: 2025-03-24 | End: 2025-03-24 | Stop reason: HOSPADM

## 2025-03-24 RX ORDER — MIDAZOLAM HYDROCHLORIDE 1 MG/ML
INJECTION, SOLUTION INTRAMUSCULAR; INTRAVENOUS
Status: DISCONTINUED | OUTPATIENT
Start: 2025-03-24 | End: 2025-03-24 | Stop reason: SDUPTHER

## 2025-03-24 RX ORDER — SODIUM CHLORIDE 9 MG/ML
INJECTION, SOLUTION INTRAVENOUS PRN
Status: DISCONTINUED | OUTPATIENT
Start: 2025-03-24 | End: 2025-03-24 | Stop reason: HOSPADM

## 2025-03-24 RX ORDER — OXYCODONE HYDROCHLORIDE 5 MG/1
5 TABLET ORAL PRN
Status: DISCONTINUED | OUTPATIENT
Start: 2025-03-24 | End: 2025-03-24 | Stop reason: HOSPADM

## 2025-03-24 RX ORDER — CEFAZOLIN SODIUM 1 G/3ML
INJECTION, POWDER, FOR SOLUTION INTRAMUSCULAR; INTRAVENOUS
Status: DISCONTINUED
Start: 2025-03-24 | End: 2025-03-24 | Stop reason: HOSPADM

## 2025-03-24 RX ORDER — ONDANSETRON 2 MG/ML
INJECTION INTRAMUSCULAR; INTRAVENOUS
Status: DISCONTINUED | OUTPATIENT
Start: 2025-03-24 | End: 2025-03-24 | Stop reason: SDUPTHER

## 2025-03-24 RX ORDER — MEPERIDINE HYDROCHLORIDE 25 MG/ML
12.5 INJECTION INTRAMUSCULAR; INTRAVENOUS; SUBCUTANEOUS EVERY 5 MIN PRN
Status: DISCONTINUED | OUTPATIENT
Start: 2025-03-24 | End: 2025-03-24 | Stop reason: HOSPADM

## 2025-03-24 RX ORDER — DROPERIDOL 2.5 MG/ML
0.62 INJECTION, SOLUTION INTRAMUSCULAR; INTRAVENOUS
Status: DISCONTINUED | OUTPATIENT
Start: 2025-03-24 | End: 2025-03-24 | Stop reason: HOSPADM

## 2025-03-24 RX ORDER — LIDOCAINE HYDROCHLORIDE 10 MG/ML
1 INJECTION, SOLUTION EPIDURAL; INFILTRATION; INTRACAUDAL; PERINEURAL
Status: DISCONTINUED | OUTPATIENT
Start: 2025-03-24 | End: 2025-03-24 | Stop reason: HOSPADM

## 2025-03-24 RX ORDER — TRAMADOL HYDROCHLORIDE 50 MG/1
50 TABLET ORAL EVERY 6 HOURS PRN
Qty: 20 TABLET | Refills: 0 | Status: SHIPPED | OUTPATIENT
Start: 2025-03-24 | End: 2025-03-29

## 2025-03-24 RX ORDER — SODIUM CHLORIDE, SODIUM LACTATE, POTASSIUM CHLORIDE, CALCIUM CHLORIDE 600; 310; 30; 20 MG/100ML; MG/100ML; MG/100ML; MG/100ML
INJECTION, SOLUTION INTRAVENOUS CONTINUOUS
Status: DISCONTINUED | OUTPATIENT
Start: 2025-03-24 | End: 2025-03-24 | Stop reason: HOSPADM

## 2025-03-24 RX ORDER — NALOXONE HYDROCHLORIDE 0.4 MG/ML
INJECTION, SOLUTION INTRAMUSCULAR; INTRAVENOUS; SUBCUTANEOUS PRN
Status: DISCONTINUED | OUTPATIENT
Start: 2025-03-24 | End: 2025-03-24 | Stop reason: HOSPADM

## 2025-03-24 RX ORDER — OXYCODONE HYDROCHLORIDE 5 MG/1
10 TABLET ORAL PRN
Status: DISCONTINUED | OUTPATIENT
Start: 2025-03-24 | End: 2025-03-24 | Stop reason: HOSPADM

## 2025-03-24 RX ORDER — KETOROLAC TROMETHAMINE 30 MG/ML
15 INJECTION, SOLUTION INTRAMUSCULAR; INTRAVENOUS
Status: DISCONTINUED | OUTPATIENT
Start: 2025-03-24 | End: 2025-03-24 | Stop reason: HOSPADM

## 2025-03-24 RX ADMIN — PROPOFOL 100 MCG/KG/MIN: 10 INJECTION, EMULSION INTRAVENOUS at 08:25

## 2025-03-24 RX ADMIN — FENTANYL CITRATE 25 MCG: 50 INJECTION, SOLUTION INTRAMUSCULAR; INTRAVENOUS at 08:26

## 2025-03-24 RX ADMIN — WATER 2000 MG: 1 INJECTION INTRAMUSCULAR; INTRAVENOUS; SUBCUTANEOUS at 08:29

## 2025-03-24 RX ADMIN — FENTANYL CITRATE 25 MCG: 50 INJECTION, SOLUTION INTRAMUSCULAR; INTRAVENOUS at 08:31

## 2025-03-24 RX ADMIN — FENTANYL CITRATE 25 MCG: 50 INJECTION, SOLUTION INTRAMUSCULAR; INTRAVENOUS at 08:45

## 2025-03-24 RX ADMIN — LIDOCAINE HYDROCHLORIDE 50 MG: 20 INJECTION, SOLUTION EPIDURAL; INFILTRATION; INTRACAUDAL; PERINEURAL at 08:25

## 2025-03-24 RX ADMIN — FENTANYL CITRATE 25 MCG: 50 INJECTION, SOLUTION INTRAMUSCULAR; INTRAVENOUS at 08:48

## 2025-03-24 RX ADMIN — MIDAZOLAM HYDROCHLORIDE 2 MG: 1 INJECTION, SOLUTION INTRAMUSCULAR; INTRAVENOUS at 08:21

## 2025-03-24 RX ADMIN — SODIUM CHLORIDE: 0.9 INJECTION, SOLUTION INTRAVENOUS at 07:29

## 2025-03-24 RX ADMIN — ONDANSETRON 4 MG: 2 INJECTION, SOLUTION INTRAMUSCULAR; INTRAVENOUS at 08:25

## 2025-03-24 ASSESSMENT — PAIN - FUNCTIONAL ASSESSMENT: PAIN_FUNCTIONAL_ASSESSMENT: 0-10

## 2025-03-24 NOTE — PROGRESS NOTES
Permission received to review discharge instructions and private health information with brotherMaciej and will have family/friends home with them for 24 hours.     Mistral air blanket applied and set to patient's desired comfort     Belongings placed in PACU glasses and wedding ring. Upper partial kept with pt.

## 2025-03-24 NOTE — PERIOP NOTE
Micheal CARTY [facility]  1955  [id number]    Situation:  Verbal report given from: RN and CRNA  Procedure: Procedure(s):  RIGHT OPEN CARPAL TUNNEL RELEASE, RIGHT LONG FINGER TRIGGER FINGER RELEASE (MAC WITH LOCAL)    Background:    Preoperative diagnosis: Right carpal tunnel syndrome [G56.01]  Trigger middle finger of right hand [M65.331]    Postoperative diagnosis: * No post-op diagnosis entered *    :  Dr. Bingham    Assistant(s): Circulator: Dee Dee Sidhu RN  Scrub Person First: Rik Mcneil  Physician Assistant: Jacinda Burgos PA-C    Specimens: * No specimens in log *    Assessment:  Intra-procedure medications         Anesthesia gave intra-procedure sedation and medications, see anesthesia flow sheet     Intravenous fluids: NS@ KVO     Vital signs stable       Recommendation:    Permission to share finding with BrotherMaciej

## 2025-03-24 NOTE — ANESTHESIA POSTPROCEDURE EVALUATION
Department of Anesthesiology  Postprocedure Note    Patient: Micheal Tejeda  MRN: [id number]  YOB: 1955  Date of evaluation: 3/24/2025    Procedure Summary       Date: 03/24/25 Room / Location: [facility] ASU  / [facility] AMBULATORY OR    Anesthesia Start: 0821 Anesthesia Stop: 0903    Procedure: RIGHT OPEN CARPAL TUNNEL RELEASE, RIGHT LONG FINGER TRIGGER FINGER RELEASE (MAC WITH LOCAL) (Right: Wrist) Diagnosis:       Right carpal tunnel syndrome      Trigger middle finger of right hand      (Right carpal tunnel syndrome [G56.01])      (Trigger middle finger of right hand [M65.331])    Surgeons: Homero Bingham MD Responsible Provider: Giuseppe Tello MD    Anesthesia Type: MAC ASA Status: 2            Anesthesia Type: MAC    Sanford Phase I: Sanford Score: 10    Sanford Phase II: Sanford Score: 10    Anesthesia Post Evaluation    Patient location during evaluation: PACU  Patient participation: complete - patient participated  Level of consciousness: awake and alert  Airway patency: patent  Nausea & Vomiting: no nausea and no vomiting  Cardiovascular status: hemodynamically stable  Respiratory status: acceptable  Hydration status: stable    No notable events documented.

## 2025-03-24 NOTE — ANESTHESIA PRE PROCEDURE
Department of Anesthesiology  Preprocedure Note       Name:  Micheal Tejeda   Age:  69 y.o.  :  1955                                          MRN:  [id number]         Date:  3/24/2025      Surgeon: Surgeon(s):  Homero Bingham MD    Procedure: Procedure(s):  RIGHT OPEN CARPAL TUNNEL RELEASE, RIGHT LONG FINGER TRIGGER FINGER RELEASE (MAC WITH LOCAL)    Medications prior to admission:   Prior to Admission medications    Medication Sig Start Date End Date Taking? Authorizing Provider   meloxicam (MOBIC) 15 MG tablet Take 1 tablet by mouth Every Day   Yes ProviderMelinda MD   losartan-hydroCHLOROthiazide (HYZAAR) 100-12.5 MG per tablet Take 1 tablet by mouth once daily 3/14/25  Yes Naldo Christopher MD   fluticasone (FLONASE) 50 MCG/ACT nasal spray 1 spray by Each Nostril route in the morning and at bedtime 25  Yes Naldo Christopher MD   carvedilol (COREG) 6.25 MG tablet TAKE 1 TABLET BY MOUTH TWICE DAILY WITH MEALS 25  Yes Naldo Christopher MD   atorvastatin (LIPITOR) 10 MG tablet Take 1 tablet by mouth once daily 25  Yes Naldo Christopher MD   amLODIPine (NORVASC) 10 MG tablet Take 1 tablet by mouth once daily 24  Yes Romeo Shukla MD   furosemide (LASIX) 40 MG tablet Take 1 tablet by mouth once daily 24  Yes Romeo Shukla MD   metFORMIN (GLUCOPHAGE-XR) 500 MG extended release tablet TAKE 4 TABLETS BY MOUTH ONCE DAILY 24  Yes Romeo Shukla MD   dilTIAZem (TIAZAC) 360 MG extended release capsule Take 1 capsule by mouth once daily 24  Yes Romeo Shukla MD   Insulin NPH Isophane & Regular (NOVOLIN 70/30 FLEXPEN) (70-30) 100 UNIT per ML injection pen Inject 50 units before breakfast, 15 units before lunch and 40 units before dinner + 5 units for every 50 mg/dl above 150 mg/dl--max 150 units/day--dispense relion if brand novolin is not covered 24  Yes Romeo Shukla MD   alfuzosin (UROXATRAL) 10 MG extended release tablet Take 1 tablet by mouth

## 2025-03-24 NOTE — H&P
Subjective:   Patient ID: Micheal Tejeda is a 69 y.o. male.     Hand-dominance: Left        Chief Complaint: Pain and Numbness of the Right Hand      History of present illness:      This is a 69-year-old left-hand-dominant male who presents today with right hand numbness as well as bilateral long finger pain.  Numbness involves the index long and ring finger on the right.  Does get nocturnal symptoms.  Symptoms are constant.  Does wear a brace.  Does have a history of diabetes.  Last hemoglobin A1c was 8.4.  Denies any neck pain.  Pain is 4/10 at rest today.     Pain rating = 4  out of 10      ROS, PMHx, and SocHx reviewed with no changes.     Review of Systems   2/18/2025     Constitutional: Unexplained: Negative  Genitourinary: Frequent Urination: Positive  HEENT: Vision Loss: Negative  Neurological: Memory Loss: Negative  Integumentary: Rash: Negative  Cardiovascular: Palpatations: Negative  Hematologic: Bruises/Bleeds Easily: Negative  Gastrointestinal: Constipation: Negative  Immunological: Seasonal Allergies: Positive  Musculoskeletal: Joint Pain: Positive        Current Outpatient Medications:     alfuzosin (UROXATRAL) 10 MG 24 hr tablet, Take 10 mg by mouth daily, Disp: , Rfl:     amLODIPine (NORVASC) 10 MG tablet, Take 10 mg by mouth once daily, Disp: , Rfl:     atorvastatin (LIPITOR) 10 MG tablet, Take 10 mg by mouth daily, Disp: , Rfl:     carvedilol (COREG) 6.25 MG tablet, Take 6.25 mg by mouth 2 (two) times a day with meals, Disp: , Rfl:     Cholecalciferol (VITAMIN D3) 125 MCG (5000 UT) tablet, Take 1 tablet by mouth daily, Disp: , Rfl:     Cyanocobalamin (VITAMIN B 12 PO), Take by mouth, Disp: , Rfl:     diltiazem (TIAZAC) 360 MG 24 hr capsule, , Disp: , Rfl:     EQ Aspirin Adult Low Dose 81 MG EC tablet, Take 81 mg by mouth 2 (two) times a day, Disp: , Rfl:     fluticasone (FLONASE) 50 MCG/ACT nasal spray, 1 spray 2 times daily, Disp: , Rfl:     furosemide (LASIX) 40 MG tablet, Take 40 mg by

## 2025-03-24 NOTE — PERIOP NOTE
0910 tolerating po fluids without diff.  Denies complaints.  0915 all d/c instructions have been reviewed with brother by phone.  Verbalized understanding. Pt agreeable with plan for discharge.  0925 iv d/c'd, assisted pt dressing.  Remains A+Ox4 without complaints.  0933 discharged to car via w/c home with brother.  Remains A+Ox4.  Pt wearing wedding ring and glasses.

## 2025-03-24 NOTE — DISCHARGE INSTRUCTIONS
[facility]  Post-operative instructions  For: Micheal Tejeda    Your first postop appointment should be scheduled with Dr. Bingham for 2-3 weeks post-op.    [facility]  [address], [address]  [place] [address]  Phone: [phone]  Fax: [phone]    Please follow these instructions for a safe and speedy recovery:    1. Surgical Bandage: Leave the bandage in place until 2 weeks after surgery. Please keep it clean and dry. To shower or bathe, apply a plastic bag or GLAD Press'n Seal® plastic wrap around the bandage or simply sponge bathe. After 2 weeks, you can remove the dressing and get incision wet but NO SOAKING.     2. Elevation: Hand swelling is best prevented by keeping your hand elevated above the level of your heart at all times, night and day. The opposite, dangling your hand below your waist, will cause additional pain, swelling, and later stiffness. You can elevate the hand in a sling or by propping it on a pillow at night. Ice compresses may help but do not replace elevation. Frequently, extreme pain is caused by a tight bandage, which should be loosened. If pain is severe and progressive, call us at [phone] during the day (ask for immediate connection to Dr. Bingham's Team) or during the night [phone](ask for the on-call physician).    3. Medication: You will be provided with an appropriate pain medication (over-the-counter or prescription). Please fill this at a pharmacy promptly so you will have it available when all local anesthetic wears off. Take this to relieve pain as directed on the bottle. Please refrain from driving, drinking alcohol, and making important medical decisions while taking the medication. Please call us if you need something stronger. Medication changes or refills must be made before 5pm or through your pharmacy.    4. Weight bearing: Do NOT bear any weight on the

## 2025-03-28 RX ORDER — ATORVASTATIN CALCIUM 10 MG/1
10 TABLET, FILM COATED ORAL DAILY
Qty: 90 TABLET | Refills: 3 | Status: SHIPPED | OUTPATIENT
Start: 2025-03-28

## 2025-04-07 RX ORDER — HUMAN INSULIN 100 [IU]/ML
INJECTION, SUSPENSION SUBCUTANEOUS
Qty: 45 ML | Refills: 11 | Status: SHIPPED | OUTPATIENT
Start: 2025-04-07

## 2025-04-14 RX ORDER — CARVEDILOL 6.25 MG/1
6.25 TABLET ORAL 2 TIMES DAILY WITH MEALS
Qty: 180 TABLET | Refills: 3 | Status: SHIPPED | OUTPATIENT
Start: 2025-04-14

## 2025-05-24 DIAGNOSIS — E78.2 MIXED HYPERLIPIDEMIA: ICD-10-CM

## 2025-05-24 DIAGNOSIS — E11.65 TYPE 2 DIABETES MELLITUS WITH HYPERGLYCEMIA, WITH LONG-TERM CURRENT USE OF INSULIN (HCC): ICD-10-CM

## 2025-05-24 DIAGNOSIS — I10 ESSENTIAL (PRIMARY) HYPERTENSION: ICD-10-CM

## 2025-05-24 DIAGNOSIS — E55.9 VITAMIN D DEFICIENCY: ICD-10-CM

## 2025-05-24 DIAGNOSIS — Z79.4 TYPE 2 DIABETES MELLITUS WITH HYPERGLYCEMIA, WITH LONG-TERM CURRENT USE OF INSULIN (HCC): ICD-10-CM

## 2025-05-30 LAB — HBA1C MFR BLD: 7.9 % (ref 4.8–5.6)

## 2025-05-31 LAB
25(OH)D3+25(OH)D2 SERPL-MCNC: 67.6 NG/ML (ref 30–100)
ALBUMIN/CREAT UR: <10 MG/G CREAT (ref 0–29)
BUN SERPL-MCNC: 15 MG/DL (ref 8–27)
BUN/CREAT SERPL: 16 (ref 10–24)
CALCIUM SERPL-MCNC: 9.4 MG/DL (ref 8.6–10.2)
CHLORIDE SERPL-SCNC: 101 MMOL/L (ref 96–106)
CO2 SERPL-SCNC: 22 MMOL/L (ref 20–29)
CREAT SERPL-MCNC: 0.96 MG/DL (ref 0.76–1.27)
CREAT UR-MCNC: 29.6 MG/DL
EGFRCR SERPLBLD CKD-EPI 2021: 85 ML/MIN/1.73
GLUCOSE SERPL-MCNC: 93 MG/DL (ref 70–99)
MICROALBUMIN UR-MCNC: <3 UG/ML
POTASSIUM SERPL-SCNC: 4.2 MMOL/L (ref 3.5–5.2)
SODIUM SERPL-SCNC: 141 MMOL/L (ref 134–144)

## 2025-06-10 ENCOUNTER — RESULTS FOLLOW-UP (OUTPATIENT)
Age: 70
End: 2025-06-10

## 2025-06-10 ENCOUNTER — OFFICE VISIT (OUTPATIENT)
Age: 70
End: 2025-06-10
Payer: MEDICARE

## 2025-06-10 VITALS
SYSTOLIC BLOOD PRESSURE: 124 MMHG | BODY MASS INDEX: 32.68 KG/M2 | DIASTOLIC BLOOD PRESSURE: 56 MMHG | HEART RATE: 57 BPM | WEIGHT: 231 LBS | RESPIRATION RATE: 16 BRPM

## 2025-06-10 DIAGNOSIS — I10 ESSENTIAL (PRIMARY) HYPERTENSION: ICD-10-CM

## 2025-06-10 DIAGNOSIS — E55.9 VITAMIN D DEFICIENCY: ICD-10-CM

## 2025-06-10 DIAGNOSIS — E11.65 TYPE 2 DIABETES MELLITUS WITH HYPERGLYCEMIA, WITH LONG-TERM CURRENT USE OF INSULIN (HCC): Primary | ICD-10-CM

## 2025-06-10 DIAGNOSIS — Z79.4 TYPE 2 DIABETES MELLITUS WITH HYPERGLYCEMIA, WITH LONG-TERM CURRENT USE OF INSULIN (HCC): Primary | ICD-10-CM

## 2025-06-10 DIAGNOSIS — E78.2 MIXED HYPERLIPIDEMIA: ICD-10-CM

## 2025-06-10 PROCEDURE — 1159F MED LIST DOCD IN RCRD: CPT | Performed by: INTERNAL MEDICINE

## 2025-06-10 PROCEDURE — 3078F DIAST BP <80 MM HG: CPT | Performed by: INTERNAL MEDICINE

## 2025-06-10 PROCEDURE — 1160F RVW MEDS BY RX/DR IN RCRD: CPT | Performed by: INTERNAL MEDICINE

## 2025-06-10 PROCEDURE — G8417 CALC BMI ABV UP PARAM F/U: HCPCS | Performed by: INTERNAL MEDICINE

## 2025-06-10 PROCEDURE — 99214 OFFICE O/P EST MOD 30 MIN: CPT | Performed by: INTERNAL MEDICINE

## 2025-06-10 PROCEDURE — 1123F ACP DISCUSS/DSCN MKR DOCD: CPT | Performed by: INTERNAL MEDICINE

## 2025-06-10 PROCEDURE — G2211 COMPLEX E/M VISIT ADD ON: HCPCS | Performed by: INTERNAL MEDICINE

## 2025-06-10 PROCEDURE — 1036F TOBACCO NON-USER: CPT | Performed by: INTERNAL MEDICINE

## 2025-06-10 PROCEDURE — 3017F COLORECTAL CA SCREEN DOC REV: CPT | Performed by: INTERNAL MEDICINE

## 2025-06-10 PROCEDURE — 3051F HG A1C>EQUAL 7.0%<8.0%: CPT | Performed by: INTERNAL MEDICINE

## 2025-06-10 PROCEDURE — 3074F SYST BP LT 130 MM HG: CPT | Performed by: INTERNAL MEDICINE

## 2025-06-10 PROCEDURE — 95251 CONT GLUC MNTR ANALYSIS I&R: CPT | Performed by: INTERNAL MEDICINE

## 2025-06-10 PROCEDURE — G8427 DOCREV CUR MEDS BY ELIG CLIN: HCPCS | Performed by: INTERNAL MEDICINE

## 2025-06-10 PROCEDURE — 2022F DILAT RTA XM EVC RTNOPTHY: CPT | Performed by: INTERNAL MEDICINE

## 2025-06-10 RX ORDER — TADALAFIL 20 MG/1
TABLET ORAL
COMMUNITY
Start: 2025-03-20

## 2025-06-10 RX ORDER — CARVEDILOL 25 MG/1
25 TABLET ORAL 2 TIMES DAILY WITH MEALS
COMMUNITY
Start: 2025-04-17

## 2025-06-10 NOTE — PROGRESS NOTES
Chief Complaint   Patient presents with    Follow-up     Patient consent to Jaya Neal    Diabetes     History of Present Illness: Micheal Tejeda is a 69 y.o. male here for follow up of diabetes.  Weight down 2 lbs since last visit in 12/24.  Review of his mya data over the past 90 days shows 66% in range, 33% high, and 1% low.       he has the following indications to continue treatment with Freestyle Mya:   1) he has type 2 diabetes (E11.65) and is on an intensive insulin regimen with 3 injections per day   2) he currently test his blood sugar 4-6 times daily on his mya and makes treatment decisions off his freestyle mya sensor readings   3) he requires frequent adjustments to his insulin injection doses based on his freestyle mya sensor readings   4) he has benefitted from therapeutic continuous glucose monitoring and I recommend that he continue this   5) he is seen in my office every 6 month              History of Present Illness  The patient is a 69-year-old male here for a follow-up of diabetes.    He has been adhering to his insulin regimen, although with some inconsistencies in timing due to late morning awakenings. Dietary habits have improved, with a reduction in nighttime snacking. He typically consumes brunch instead of breakfast and lunch. He is aware of the need to limit his daily insulin intake to 150 units. He continues to take metformin, 2 tablets twice daily.    He underwent a stress test, which yielded normal results. Blood pressure readings at home have been variable, with a reading of 127/63 this morning. Current medication regimen includes carvedilol 25 mg twice daily, losartan/hydrochlorothiazide 100/12.5 mg, amlodipine 10 mg daily, furosemide 40 mg daily, and atorvastatin 10 mg for cholesterol management. He was previously on diltiazem, but this was discontinued by Dr. Zambrano, who also increased his carvedilol dosage from 6.25 mg to 25 mg twice daily.    He has been

## 2025-06-10 NOTE — PATIENT INSTRUCTIONS
1) Your Hemoglobin A1c (3 month test of blood sugar) is 7.9% down from 8.4% and my goal is 7.5% or less so you are almost back to goal.    2) BUN and creatinine are markers of kidney function.  Your values are normal.    3) Your urine and vitamin D are normal.    4) Your blood pressure is well controlled.    5) You can try Alfonzo Espinoza at [facility] [phone].

## 2025-06-10 NOTE — PROGRESS NOTES
Identified pt with two pt identifiers(name and ). Reviewed record in preparation for visit and have obtained necessary documentation. All patient medications has been reviewed.  Chief Complaint   Patient presents with    Follow-up     Patient consent to Jaya Copilot    Diabetes           Wt Readings from Last 3 Encounters:   06/10/25 104.8 kg (231 lb)   25 102.1 kg (225 lb)   25 103.9 kg (229 lb)     Temp Readings from Last 3 Encounters:   25 97.3 °F (36.3 °C) (Temporal)   25 97.7 °F (36.5 °C) (Temporal)   25 97.2 °F (36.2 °C) (Temporal)     BP Readings from Last 3 Encounters:   06/10/25 (!) 157/72   25 (!) 175/81   25 (!) 166/76     Pulse Readings from Last 3 Encounters:   06/10/25 57   25 67   25 78       Have you been to the ER, urgent care clinic since your last visit?  Hospitalized since your last visit?   NO    Have you seen or consulted any other health care providers outside our system since your last visit?   NO

## 2025-06-25 ENCOUNTER — TELEPHONE (OUTPATIENT)
Age: 70
End: 2025-06-25

## 2025-06-25 NOTE — TELEPHONE ENCOUNTER
Spoke to the pt and notified him of the message per Dr. Shukla. Patient understood with no further questions.

## 2025-06-25 NOTE — TELEPHONE ENCOUNTER
Pt LVM stating he hand hand surgery and was placed on a methylprednisolone packet for 6 days. Pt stated he was informed that this could raise his sugar levels and would like to know if the medication is okay for him to start for 6 days.

## 2025-06-25 NOTE — TELEPHONE ENCOUNTER
He is ok to take this for 6 days but he will need to increase his insulin while on this.  He should add 15 units to his breakfast dose, 5 units to his lunch dose and 10 units to his dinner dose from what the scale says while on the steroids and once off these, he can go back to his normal doses.

## 2025-07-21 RX ORDER — METFORMIN HYDROCHLORIDE 500 MG/1
TABLET, EXTENDED RELEASE ORAL
Qty: 360 TABLET | Refills: 0 | Status: SHIPPED | OUTPATIENT
Start: 2025-07-21

## 2025-08-16 RX ORDER — FUROSEMIDE 40 MG/1
40 TABLET ORAL DAILY
Qty: 90 TABLET | Refills: 3 | Status: SHIPPED | OUTPATIENT
Start: 2025-08-16

## (undated) DEVICE — SOLUTION IRRIG 1000ML 09% SOD CHL USP PIC PLAS CONTAINER

## (undated) DEVICE — CUFF BLD PRSS AD CLTH SGL TB W/ BAYNT CONN ROUNDED CORNER

## (undated) DEVICE — BANDAGE COMPR W2INXL5YD WHT BGE POLY COT M E WRP WV HK AND

## (undated) DEVICE — CORD ES L12FT BPLR FRCP

## (undated) DEVICE — GOWN,SIRUS,NONRNF,SETINSLV,XL,20/CS: Brand: MEDLINE

## (undated) DEVICE — IV START KIT: Brand: MEDLINE

## (undated) DEVICE — GLOVE SURG SZ 65 THK91MIL LTX FREE SYN POLYISOPRENE

## (undated) DEVICE — ENDOSCOPIC KIT COMPLIANCE ENDOKIT

## (undated) DEVICE — ELECTRODE PT RET AD L9FT HI MOIST COND ADH HYDRGEL CORDED

## (undated) DEVICE — GLOVE ORANGE PI 7   MSG9070

## (undated) DEVICE — HAND-MRMCASU: Brand: MEDLINE INDUSTRIES, INC.

## (undated) DEVICE — GLOVE SURG SZ 7 L12IN FNGR THK79MIL GRN LTX FREE

## (undated) DEVICE — ZIMMER® STERILE DISPOSABLE TOURNIQUET CUFF WITH PLC, DUAL PORT, SINGLE BLADDER, 18 IN. (46 CM)

## (undated) DEVICE — SNARE ENDOSCP POLYP 2.4 MM 240 CM 10 MM 2.8 MM CAPTIVATOR

## (undated) DEVICE — SUTURE ETHILON SZ 3-0 L18IN NONABSORBABLE BLK PS-2 L19MM 3/8 1669H

## (undated) DEVICE — SET GRAV CK VLV NEEDLESS ST 3 GANGED 4WAY STPCOCK HI FLO 10

## (undated) DEVICE — CONTAINER SPEC 20 ML LID NEUT BUFF FORMALIN 10 % POLYPR STS

## (undated) DEVICE — TIP SUCT TRNSPAR RIB SURF STD BLB RIG NVENT W/ 5IN1 CONN DYND50138] MEDLINE INDUSTRIES INC]